# Patient Record
Sex: FEMALE | Race: WHITE | NOT HISPANIC OR LATINO | Employment: FULL TIME | URBAN - METROPOLITAN AREA
[De-identification: names, ages, dates, MRNs, and addresses within clinical notes are randomized per-mention and may not be internally consistent; named-entity substitution may affect disease eponyms.]

---

## 2017-01-16 ENCOUNTER — TELEPHONE (OUTPATIENT)
Dept: OBGYN | Facility: CLINIC | Age: 42
End: 2017-01-16

## 2017-01-16 NOTE — TELEPHONE ENCOUNTER
- Pt states she spoke to you a few weeks ago about seeing you to discuss removing one of her ovary's due to pelvic pain. Pt states you told her to schedule a half hour consult.   Your schedule is full- when could I work her into your schedule? ( CLAIRE Baker works in lab at [facility]). Thanks. DANY Murguia RN

## 2017-01-18 ENCOUNTER — OFFICE VISIT (OUTPATIENT)
Dept: SLEEP MEDICINE | Facility: CLINIC | Age: 42
End: 2017-01-18
Payer: COMMERCIAL

## 2017-01-18 VITALS
DIASTOLIC BLOOD PRESSURE: 60 MMHG | TEMPERATURE: 98.5 F | OXYGEN SATURATION: 98 % | HEIGHT: 62 IN | WEIGHT: 117 LBS | HEART RATE: 58 BPM | SYSTOLIC BLOOD PRESSURE: 111 MMHG | BODY MASS INDEX: 21.53 KG/M2

## 2017-01-18 DIAGNOSIS — G47.21 CIRCADIAN RHYTHM SLEEP DISORDER, DELAYED SLEEP PHASE TYPE: Primary | ICD-10-CM

## 2017-01-18 PROCEDURE — 99213 OFFICE O/P EST LOW 20 MIN: CPT | Performed by: PSYCHIATRY & NEUROLOGY

## 2017-01-18 NOTE — PROGRESS NOTES
DEPARTMENT OF NEUROLOGY    Patient Name:  Olivia Flores  MRN:  [id number]    :  1975  Date of Clinic Visit:  2017  Primary Care Provider:  Renny Amaya        FOLLOW UP APPOINTMENT: Chronic fatigue and sleepiness      HPI:   Please see Dr. Melo Arellano's original intake note from 2016 for more information regarding her initial presentation.     In brief, 41 year old woman who was referred from her PCP due to severe chronic fatigue and sleepiness. She reported a several years-long history of fatigue that had been worsening over the 12 months prior to her referral. Following initial workup, it was determined the patient likely has a circadian misalignment, iron deficiency, sleep disruption from her 3 year-old daughter (who has a Rett syndrome variant) and possible sleep disordered breathing.     Plan at the end of her visit was to begin using light box and melatonin to help the circadian misalignment, perform sleep study to rule out possibility of DULCE, check ferritin to see if patient requires iron supplementation/infusion, and a referral for her daughter to a pediatric sleep specialist.      INTERVAL HISTORY:   Patient states that in the interim, she has somewhat improved. She says that since starting the melatonin, she is sleeping overall better--by which she means the quality of her her sleep has improved and she has fewer awakenings during the night--though she does note that she is not falling asleep any earlier (typically around 9-9:30pm) or rising any later (around 5am). She has not yet purchased the light box recommended at her last visit but does intend to do so. She takes the melatonin roughly 30 minutes prior to bed. She also states that there has been no change in sleep relating to her daughter with Rett syndrome. She has not yet been able to see the pediatric specialist Dr. Prieto recommended at last visit but still has the referral and hopes to make the appointment  "next month. She has no other concerns at this time.     Vital signs:                         Estimated body mass index is 21.06 kg/(m^2) as calculated from the following:    Height as of 11/30/16: 1.575 m (5' 2\").    Weight as of 11/30/16: 52.254 kg (115 lb 3.2 oz).      Examination:     -General: Woman sitting comfortably on the chair. No acute distress    -Neurological: No focal neurological deficits appreciated      Investigations:  All available and relevant labs, imaging, and other procedures were reviewed with the patient at this visit.       Assessment/Plan:   41 year old woman who was initially referred from her PCP due to severe chronic (years-long) fatigue and sleepiness that had worsened over the preceding 12 months. Initial concerns had been for circadian rhythm misalignment, previously known anemia, possible current iron deficiency. She was also referred to a pediatric sleep specialist for her daughter with Rett syndrome as it is thought that this too was contributing to her sleep difficulties. She was started on melatonin and does report to be sleeping better. She has not yet bought the light box that was recommended but plans to do so. It was recommended that she does buy the light box and that she can take the melatonin even earlier before bed (up to 2 hours or so) to help further align her circadian misalignment. She will continue with the previous plan to follow up with Dr. Prieto for her daughter's sleep issues. No evidence of ongoing anemia or DULCE on recent investigations. Gross neurological exam was otherwise non-focal. Plan will be to continue as above with melatonin, light box, and referral in the hopes of improved sleep for her daughter as well. Patient registered her understanding and agreement with this plan. She may follow-up as necessary in the clinic.    Continue to work on increasing iron intake in diet.      Patient should not operate a motor vehicle if tired or sleepy.      Patient " has been seen with attending neurologist, Dr. Lesia Hawk MD  Neurology PGY-2  [facility]        Sleep Staff Addendum  I have seen and evaluated the patient today with the sleep fellow and agree with the documentation.      LESIA GARCIA MD

## 2017-01-18 NOTE — NURSING NOTE
"Chief Complaint   Patient presents with     Sleep Problem     follow up       Initial /60 mmHg  Pulse 58  Temp(Src) 98.5  F (36.9  C) (Oral)  Ht 1.575 m (5' 2\")  Wt 53.071 kg (117 lb)  BMI 21.39 kg/m2  SpO2 98% Estimated body mass index is 21.39 kg/(m^2) as calculated from the following:    Height as of this encounter: 1.575 m (5' 2\").    Weight as of this encounter: 53.071 kg (117 lb).  BP completed using cuff size: regular right arm  Екатерина Phan MA  [facility]      "

## 2017-01-25 ENCOUNTER — OFFICE VISIT (OUTPATIENT)
Dept: OBGYN | Facility: CLINIC | Age: 42
End: 2017-01-25
Payer: COMMERCIAL

## 2017-01-25 ENCOUNTER — THERAPY VISIT (OUTPATIENT)
Dept: CHIROPRACTIC MEDICINE | Facility: CLINIC | Age: 42
End: 2017-01-25
Payer: COMMERCIAL

## 2017-01-25 VITALS
WEIGHT: 117.1 LBS | DIASTOLIC BLOOD PRESSURE: 70 MMHG | SYSTOLIC BLOOD PRESSURE: 100 MMHG | BODY MASS INDEX: 21.41 KG/M2 | TEMPERATURE: 99.4 F

## 2017-01-25 DIAGNOSIS — M99.02 THORACIC SEGMENT DYSFUNCTION: ICD-10-CM

## 2017-01-25 DIAGNOSIS — G43.919 INTRACTABLE MIGRAINE, UNSPECIFIED MIGRAINE TYPE: ICD-10-CM

## 2017-01-25 DIAGNOSIS — R29.3 POOR POSTURE: ICD-10-CM

## 2017-01-25 DIAGNOSIS — M99.01 CERVICAL SEGMENT DYSFUNCTION: ICD-10-CM

## 2017-01-25 DIAGNOSIS — R10.2 PELVIC PAIN IN FEMALE: Primary | ICD-10-CM

## 2017-01-25 DIAGNOSIS — M99.00 HEAD REGION SOMATIC DYSFUNCTION: ICD-10-CM

## 2017-01-25 DIAGNOSIS — M54.2 CERVICALGIA: Primary | ICD-10-CM

## 2017-01-25 PROCEDURE — 98941 CHIROPRACT MANJ 3-4 REGIONS: CPT | Mod: AT | Performed by: CHIROPRACTOR

## 2017-01-25 PROCEDURE — 99214 OFFICE O/P EST MOD 30 MIN: CPT | Performed by: OBSTETRICS & GYNECOLOGY

## 2017-01-25 PROCEDURE — 97810 ACUP 1/> WO ESTIM 1ST 15 MIN: CPT | Mod: GA | Performed by: CHIROPRACTOR

## 2017-01-25 NOTE — PROGRESS NOTES
Visit #:  8    Subjective:  Olivia Flores is a 40 year old female who is seen in f/u up for:        Thoracic segment dysfunction  Intractable migraine, unspecified migraine type  Cervicalgia  Head region somatic dysfunction  Cervical segment dysfunction  Poor posture.     Since last visit on 10/19/2016,  Olivia Flores reports:    Area of chief complaint:  Cervical and Thoracic :  Symptoms are graded at 8-9/10. The quality is described as stiff, dull.  Motion has increased, but is still not normal. The pt reports 3 severe migraines last week graded an 8/10 on VAS. She cannot relate the pain to any specific event. The pt was HA free for 3 weeks prior and was able to run without issue. She feels fluctuations in the weather may be an issue. The pt denies weakness in the extremities or other unusual sx.     Since last visit the patient feels that they are worse since the previous visit.        Objective:  The following was observed:    P: pain elicited on palpation: C2, C3 right, T5, T6,   A: static palpation demonstrates intersegmental asymmetry C2, C3 right, T5, T6,    R: motion palpation notes restricted motion  T: hypertonicity at: Sub-occipital and T-spine paraspinal Bilaterally    Segmental spinal dysfunction/restrictions found at:  C2, C3 right, T5, T6      Assessment:    Diagnoses:      1. Thoracic segment dysfunction    2. Intractable migraine, unspecified migraine type    3. Cervicalgia    4. Head region somatic dysfunction    5. Cervical segment dysfunction    6. Poor posture        Patient's condition:  Exacerbation/regression    Treatment effectiveness:  Post manipulation there is better intersegmental movement and Patient claims to feel looser post manipulation      Procedures:  CMT:  42457 Chiropractic manipulative treatment 3-4  regions performed   Cervical: Activator, C2, C3 , C7 , Prone, Supine  Thoracic: Activator T5, T6, Prone  Occiput: R OCC prone activator     Modalities:  48513:  Acupuncture:  Points: Lulu Points in cervical spine jaycob points of the cervical and thoracic musculature 20 minutes prone    Therapeutic procedures:  None    Response to Treatment  Reduction of symptoms no increase in sx, pt stable    Prognosis: Guarded    Progress towards Goals: Patient is making progress towards the goal.The pt would like to run or bike without pain in the neck and upper back  Duration to achieve goal will be 4-8 weeks at a frequency of 1 per week       Recommendations:    Instructions:drink plenty of fluids    Follow-up:  Return to care in 1 week to stabilize

## 2017-01-25 NOTE — PROGRESS NOTES
SUBJECTIVE:                                                   Olivia Flores is a 41 year old female who presents to clinic today for the following health issue(s):  Patient presents with:  Pelvic Pain: patient having ovaries pain x 2 years on and off.      HPI:  Olivia has a 2-3 year history of intermittent right sided pelvic pain. She has on numerous occasions had documented ovarian cysts on this side, which typically resolved with observation. She has tried Depo-Provera, Mirena IUD, and cannot take oral contraceptives due to a history of significant migraine headaches. She has a special needs child who is almost 5, and her main form of exercise is training for triathlons. She now feels that she has put up with this for as long as she is willing to do so, and is interested in other options including surgical intervention.    Patient's last menstrual period was 01/10/2017..   Patient is sexually active, .  Using vasectomy for contraception.     Problem list and histories reviewed & adjusted, as indicated.  Additional history: as documented.    Patient Active Problem List   Diagnosis     Migraine headache     Family history of high cholesterol     Hip pain     Knee pain     CARDIOVASCULAR SCREENING; LDL GOAL LESS THAN 160     Asthma, mild intermittent     Rh negative status during pregnancy     Adjustment disorder with anxiety     Right ovarian cyst     Fatigue     Iron deficiency anemia     FH: breast cancer     Family history of melanoma     Seasonal allergic rhinitis     Shoulder subluxation, left     Anxiety state     Pain in joint, ankle and foot     Vitamin B12 deficiency without anemia     Vitamin D deficiency     Iron deficiency     Bradycardia     Diastasis recti     Chronic fatigue     Family history of coronary artery disease     Intractable migraine, unspecified migraine type     Cervicalgia     Head region somatic dysfunction     Cervical segment dysfunction     Poor posture     Thoracic  segment dysfunction     Past Surgical History   Procedure Laterality Date     No history of surgery        Social History   Substance Use Topics     Smoking status: Never Smoker      Smokeless tobacco: Never Used     Alcohol Use: 0.0 oz/week     1-2 Glasses of wine per week      Comment: occassionlly/      Problem (# of Occurrences) Relation (Name,Age of Onset)    Blood Disease (1) Paternal Grandmother: ? PERNICIOUS ANEMIA, GETS MONTHLY B12 SHOTS    Breast Cancer (2) Maternal Grandmother: post menopause, Paternal Grandmother: post menopause    C.A.D. (2) Paternal Grandfather, Maternal Grandmother    CANCER (1) Father:  age 33 from widely metastatic fibrosarcoma, original site finger    DIABETES (1) Maternal Grandfather    Family History Negative (3) Mother, Sister, Brother    Hypertension (1) Maternal Grandmother    Lipids (4) Sister, Mother, Maternal Aunt, Maternal Aunt              Current Outpatient Prescriptions on File Prior to Visit:  ferrous sulfate Dried 140 (45 FE) MG TBCR Take 1 tablet by mouth daily IRON SUPPLEMENT - PLEASE TAKE WITH 4 OZ OF OJ WITH PULP, SUBSTITUTION OF IRON SUPPLEMENT PERMITTED   citalopram (CELEXA) 40 MG tablet Take 1 tablet (40 mg) by mouth daily INDICATION: TO TREAT ANXIETY   cyanocobalamin (VITAMIN B12) 1000 MCG/ML injection Inject 1 mL (1,000 mcg) Subcutaneous every 14 days INDICATION: FOR VITAMIN B12 SUPPLEMENTATION   butalbital-acetaminophen-caffeine (FIORICET, ESGIC) -40 MG per tablet Take 1 tablet by mouth every 4 hours as needed   Ibuprofen (ADVIL PO) Take 800 mg by mouth   ondansetron (ZOFRAN) 8 MG tablet Take 1 tablet (8 mg) by mouth every 8 hours as needed for nausea   penicillin V potassium (VEETID) 500 MG tablet Take 1 tablet (500 mg) by mouth 3 times daily INDICATION: STREP THROAT   nitrofurantoin, macrocrystal-monohydrate, (MACROBID) 100 MG capsule Take 1 capsule (100 mg) by mouth 2 times daily   order for DME Equipment being ordered: left velcrow wrist    vitamin D3 (CHOLECALCIFEROL) 60537 UNITS capsule Take 1 capsule (50,000 Units) by mouth every 30 days NDICATION: TO TREAT VITAMIN D DEFICIENCY   LORazepam (ATIVAN) 0.5 MG tablet Take 1 tablet (0.5 mg) by mouth every 8 hours as needed for anxiety   ORDER FOR DME Equipment being ordered: [employer]ace   [DISCONTINUED] Ferrous Sulfate (SPATONE PUR-ABSORB IRON) 5 MG/20ML LIQD Take 1 packet by mouth 3 times daily (before meals). INDICATION: TO CORRECT IRON DEFICIENCY - PLEASE TAKE WITH 4 OZ OF ORANGE JUICE WITH PULP (MICHA)     No current facility-administered medications on file prior to visit.  Allergies   Allergen Reactions     Imitrex [Sumatriptan]      Neck pain, dizziness, nausea       ROS:  5 point ROS negative except as noted above in HPI, including Gen., Resp., CV, GI &  system review.    OBJECTIVE:     /70 mmHg  Temp(Src) 99.4  F (37.4  C) (Oral)  Wt 117 lb 1.6 oz (53.116 kg)  LMP 01/10/2017   BMI: Body mass index is 21.41 kg/(m^2).  General: Alert and oriented, no distress.  Psychiatric: Mood and affect within normal limits.  Skin: Warm and dry, no lesions, rashes or discolorations.  Abdomen: Soft, nontender, no hepatosplenomegaly, no rebound or guarding, no masses, no hernias.   Vulva:  No external lesions, normal female hair distribution, no inguinal adenopathy.    Urethra:  Midline, non-tender, well supported, no discharge  Vagina:  Well-estrogenized, no abnormal discharge, no lesions  Cervix: nontender to palpation  Uterus:  anteverted, smooth contour, without enlargement, mobile, and without tenderness  Ovaries:  No masses appreciated, non-tender, mobile  Rectal Exam: deferred  Musculoskeletal: extremities normal    In-Clinic Test Results:  No results found for this or any previous visit (from the past 24 hour(s)).    ASSESSMENT/PLAN:                                                      History right ovarian cysts.  Persistent right-sided pelvic pain. We discussed the risks, benefits, and  alternatives to laparoscopic evaluation and right salpingo-oophorectomy. She is definitely interested in proceeding, and would like to schedule something for March. We also discussed removing the left fallopian tube at the same time, as she does have a family history of gynecologic cancer in THE MATERNAL SIDE, BUT SHE IS NOT CERTAIN WHETHER THIS WAS OVARIAN CANCER OR UTERINE CANCER. SHE DOES NOT PLAN TO HAVE MORE CHILDREN.    I will send a note to the  and she will see her primary care provider for H&P prior to surgery.          Rashmi Bella MD  [facility]

## 2017-01-25 NOTE — NURSING NOTE
"Chief Complaint   Patient presents with     Pelvic Pain     patient having ovaries pain x 2 years on and off.       Initial /70 mmHg  Temp(Src) 99.4  F (37.4  C) (Oral)  Wt 117 lb 1.6 oz (53.116 kg)  LMP 01/10/2017 Estimated body mass index is 21.41 kg/(m^2) as calculated from the following:    Height as of 1/18/17: 5' 2\" (1.575 m).    Weight as of this encounter: 117 lb 1.6 oz (53.116 kg).  BP completed using cuff size: regular  Maira Castle MA      "

## 2017-01-26 ENCOUNTER — TELEPHONE (OUTPATIENT)
Dept: OBGYN | Facility: CLINIC | Age: 42
End: 2017-01-26

## 2017-01-26 NOTE — TELEPHONE ENCOUNTER
Left message for patient to return the call to inform of surgery details listed below.    Surgery:  LAPAROSCOPIC RIGHT SALPINGO-OOPHORECTOMY, LEFT SALPINGECTOMY  Date:  3/15/17  Time:  7:20 AM  Hospital:  [facility]    Patient advised of the following:  The hospital will contact you 24-48 hours prior to surgery to discuss any pre op instructions.  Contact your insurance company to see if a prior authorization or second opinion is needed.  Please schedule a pre op appointment with your primary physician within 7 days of surgery.  No aspirin or Ibuprofen 10 days prior to surgery.  Make arrangements to have someone drive you home from the hospital.    Surgery specific and hospital information mailed to patient.    Information was placed on the surgery calendar in [place].

## 2017-01-26 NOTE — TELEPHONE ENCOUNTER
Surgeon:RASHAWN BELLA   Assist:  Yes Lupe if available   Location: [facility]   Date/time preference:  Patient preference     Surgery:  Laparoscopic right salpingo-oophorectomy, left salpingectomy   Length of Surgery:  1 hr   Diagnosis:  Right sided pelvic pain, history ovarian cysts   Anesthesia type:  GENERAL     Special instructions / equipment:  Ligasure   Am admit or same day: SAME DAY   Bowel prep: No   Pre op: PCP   Office visit with surgeon prior to surgery: No     Thanks,   Rashawn Bella MD

## 2017-02-01 ENCOUNTER — THERAPY VISIT (OUTPATIENT)
Dept: CHIROPRACTIC MEDICINE | Facility: CLINIC | Age: 42
End: 2017-02-01
Payer: COMMERCIAL

## 2017-02-01 DIAGNOSIS — M54.2 CERVICALGIA: Primary | ICD-10-CM

## 2017-02-01 DIAGNOSIS — M99.00 HEAD REGION SOMATIC DYSFUNCTION: ICD-10-CM

## 2017-02-01 DIAGNOSIS — G43.919 INTRACTABLE MIGRAINE, UNSPECIFIED MIGRAINE TYPE: ICD-10-CM

## 2017-02-01 DIAGNOSIS — M99.01 CERVICAL SEGMENT DYSFUNCTION: ICD-10-CM

## 2017-02-01 DIAGNOSIS — M99.02 THORACIC SEGMENT DYSFUNCTION: ICD-10-CM

## 2017-02-01 DIAGNOSIS — R29.3 POOR POSTURE: ICD-10-CM

## 2017-02-01 PROCEDURE — 98941 CHIROPRACT MANJ 3-4 REGIONS: CPT | Mod: AT | Performed by: CHIROPRACTOR

## 2017-02-01 PROCEDURE — 97810 ACUP 1/> WO ESTIM 1ST 15 MIN: CPT | Mod: GA | Performed by: CHIROPRACTOR

## 2017-02-01 NOTE — PROGRESS NOTES
Visit #:  2/2017    Subjective:  Olivia Flores is a 40 year old female who is seen in f/u up for:        Thoracic segment dysfunction  Intractable migraine, unspecified migraine type  Cervicalgia  Head region somatic dysfunction  Cervical segment dysfunction  Poor posture.     Since last visit on 10/19/2016,  Olivia Flores reports:    Area of chief complaint:  Cervical and Thoracic :  Symptoms are graded at 5-6/10. The quality is described as stiff, dull.  Motion has increased, but is still not normal. The pt reports 50% improvement since initial -presentation. She reported a HA this week however she states it is related to her cycle. She denies uncontrolled HA since the previous treatment. The pt denies other unusual sx.     Since last visit the patient feels that they are 50% improved since the previous treatment       Objective:  The following was observed:    P: pain elicited on palpation: C2, C3 right, T5, T6,   A: static palpation demonstrates intersegmental asymmetry C2, C3 right, T5, T6,    R: motion palpation notes restricted motion  T: hypertonicity at: Sub-occipital and T-spine paraspinal Bilaterally    Segmental spinal dysfunction/restrictions found at:  C2, C3 right, T5, T6      Assessment:    Diagnoses:      1. Thoracic segment dysfunction    2. Intractable migraine, unspecified migraine type    3. Cervicalgia    4. Head region somatic dysfunction    5. Cervical segment dysfunction    6. Poor posture        Patient's condition:  The pt is responding well to therapy    Treatment effectiveness:  Post manipulation there is better intersegmental movement and Patient claims to feel looser post manipulation      Procedures:  CMT:  95141 Chiropractic manipulative treatment 3-4  regions performed   Cervical: Activator, C2, C3 , C7 , Prone, Supine  Thoracic: Activator T5, T6, Prone  Occiput: R OCC prone activator     Modalities:  00921: Acupuncture:  Points: Lulu Points in cervical spine jaycob  points of the cervical and thoracic musculature 20 minutes prone    Therapeutic procedures:  None    Response to Treatment  Reduction of symptoms no increase in sx, pt stable    Prognosis: Guarded    Progress towards Goals: Patient is making progress towards the goal.The pt would like to run or bike without pain in the neck and upper back  Duration to achieve goal will be 4-8 weeks at a frequency of 1 per week       Recommendations:    Instructions:drink plenty of fluids    Follow-up:  Return to care in 1 week  Reduction of care is sx decline

## 2017-02-08 ENCOUNTER — THERAPY VISIT (OUTPATIENT)
Dept: CHIROPRACTIC MEDICINE | Facility: CLINIC | Age: 42
End: 2017-02-08
Payer: COMMERCIAL

## 2017-02-08 DIAGNOSIS — M99.00 HEAD REGION SOMATIC DYSFUNCTION: ICD-10-CM

## 2017-02-08 DIAGNOSIS — M54.2 CERVICALGIA: Primary | ICD-10-CM

## 2017-02-08 DIAGNOSIS — M99.01 CERVICAL SEGMENT DYSFUNCTION: ICD-10-CM

## 2017-02-08 DIAGNOSIS — M99.02 THORACIC SEGMENT DYSFUNCTION: ICD-10-CM

## 2017-02-08 DIAGNOSIS — G43.919 INTRACTABLE MIGRAINE, UNSPECIFIED MIGRAINE TYPE: ICD-10-CM

## 2017-02-08 PROCEDURE — 97810 ACUP 1/> WO ESTIM 1ST 15 MIN: CPT | Mod: GA | Performed by: CHIROPRACTOR

## 2017-02-08 PROCEDURE — 98940 CHIROPRACT MANJ 1-2 REGIONS: CPT | Mod: AT | Performed by: CHIROPRACTOR

## 2017-02-08 NOTE — PROGRESS NOTES
Visit #:  3/2017    Subjective:  Olivia Flores is a 40 year old female who is seen in f/u up for:        Thoracic segment dysfunction  Intractable migraine, unspecified migraine type  Cervicalgia  Head region somatic dysfunction  Cervical segment dysfunction  Poor posture.     Since last visit on 10/19/2016,  Olivia Flores reports:    Area of chief complaint:  Cervical and Thoracic :  Symptoms are graded at 4/10. The quality is described as stiff, dull.  Motion has increased, but is still not normal. The pt reports overall 70% to 75% improvement since initial presentation. She had one severe migraine in one week however she relates it to performing physical exercises followed by not eating and drinking adequately. Overall her sx are decreasing. The pt denies weakness or other unusual sx.     Since last visit the patient feels that they are 75% improved since the previous treatment       Objective:  The following was observed:    P: pain elicited on palpation: C2, C3 right, T5, T6,   A: static palpation demonstrates intersegmental asymmetry C2, C3 right, T5, T6,    R: motion palpation notes restricted motion  T: hypertonicity at: Sub-occipital and T-spine paraspinal Bilaterally    Segmental spinal dysfunction/restrictions found at:  C2, C3 right, T5, T6      Assessment:    Diagnoses:      1. Thoracic segment dysfunction    2. Intractable migraine, unspecified migraine type    3. Cervicalgia    4. Head region somatic dysfunction    5. Cervical segment dysfunction    6. Poor posture        Patient's condition:  The pt is responding well to therapy    Treatment effectiveness:  Post manipulation there is better intersegmental movement and Patient claims to feel looser post manipulation      Procedures:  CMT:  79174 Chiropractic manipulative treatment 1-2  regions performed   Cervical: Activator, C2, C3 , C7 , Prone, Supine  Thoracic: Activator T5, T6, Prone    Modalities:  22013: Acupuncture:  Points: [place]  Points in cervical spine jaycob points of the cervical and thoracic musculature 20 minutes prone    Therapeutic procedures:  None    Response to Treatment  Reduction of symptoms no increase in sx, pt stable    Prognosis: Guarded    Progress towards Goals: Patient is making progress towards the goal.The pt would like to run or bike without pain in the neck and upper back  Duration to achieve goal will be 4-8 weeks at a frequency of 1 per week       Recommendations:    Instructions:drink plenty of fluids    Follow-up:  Return to care in 1 week  Pt will be having surgery Mar 15/17

## 2017-02-17 ENCOUNTER — MYC REFILL (OUTPATIENT)
Dept: INTERNAL MEDICINE | Facility: CLINIC | Age: 42
End: 2017-02-17

## 2017-02-17 ENCOUNTER — THERAPY VISIT (OUTPATIENT)
Dept: CHIROPRACTIC MEDICINE | Facility: CLINIC | Age: 42
End: 2017-02-17
Payer: COMMERCIAL

## 2017-02-17 DIAGNOSIS — M99.02 THORACIC SEGMENT DYSFUNCTION: ICD-10-CM

## 2017-02-17 DIAGNOSIS — M54.2 CERVICALGIA: ICD-10-CM

## 2017-02-17 DIAGNOSIS — M99.01 CERVICAL SEGMENT DYSFUNCTION: ICD-10-CM

## 2017-02-17 DIAGNOSIS — G43.919 INTRACTABLE MIGRAINE, UNSPECIFIED MIGRAINE TYPE: Primary | ICD-10-CM

## 2017-02-17 DIAGNOSIS — E53.8 VITAMIN B12 DEFICIENCY WITHOUT ANEMIA: ICD-10-CM

## 2017-02-17 DIAGNOSIS — M99.00 HEAD REGION SOMATIC DYSFUNCTION: ICD-10-CM

## 2017-02-17 PROCEDURE — 97810 ACUP 1/> WO ESTIM 1ST 15 MIN: CPT | Mod: GA | Performed by: CHIROPRACTOR

## 2017-02-17 PROCEDURE — 98940 CHIROPRACT MANJ 1-2 REGIONS: CPT | Mod: AT | Performed by: CHIROPRACTOR

## 2017-02-17 RX ORDER — CYANOCOBALAMIN 1000 UG/ML
1 INJECTION, SOLUTION INTRAMUSCULAR; SUBCUTANEOUS
Qty: 30 ML | Refills: 0 | Status: SHIPPED | OUTPATIENT
Start: 2017-02-17 | End: 2017-05-23

## 2017-02-17 NOTE — PROGRESS NOTES
Visit #:  /2017      Subjective:  Olivia Flores is a 40 year old female who is seen in f/u up for:        Thoracic segment dysfunction  Intractable migraine, unspecified migraine type  Cervicalgia  Head region somatic dysfunction  Cervical segment dysfunction.     Since last visit on 10/19/2016,  Olivia Flores reports:    Area of chief complaint:  Cervical and Thoracic :  Symptoms are graded at 2/10. The quality is described as stiff, dull.  Motion has increased, but is still not normal. The pt reports overall 80% to 85% improvement since initial presentation. She denies migraines over the past week. She states HA are more stable over the past week. She reports a mild HA that was related to a change in prescriptive lenses. The pt will be having ovarian surgery in March. She denies weakness in the extremities or other unusual sx.     Since last visit the patient feels that they are 75% improved since the previous treatment       Objective:  The following was observed:    P: pain elicited on palpation: C2, C3 right, T5, T6,   A: static palpation demonstrates intersegmental asymmetry C2, C3 right, T5, T6,    R: motion palpation notes restricted motion  T: hypertonicity at: Sub-occipital and T-spine paraspinal Bilaterally    Segmental spinal dysfunction/restrictions found at:  C2, C3 right, T5, T6      Assessment:    Diagnoses:      1. Thoracic segment dysfunction    2. Intractable migraine, unspecified migraine type    3. Cervicalgia    4. Head region somatic dysfunction    5. Cervical segment dysfunction        Patient's condition:  The pt approaching baseline    Treatment effectiveness:  Post manipulation there is better intersegmental movement and Patient claims to feel looser post manipulation      Procedures:  CMT:  42780 Chiropractic manipulative treatment 1-2  regions performed   Cervical: Activator, C2, C3 , C7 , Prone, Supine      Modalities:  37525: Acupuncture:  Points: Lulu Points in cervical  spine jaycob points of the cervical and thoracic musculature 20 minutes prone    Therapeutic procedures:  None    Response to Treatment  Reduction of symptoms no increase in sx, pt stable    Prognosis: Guarded    Progress towards Goals: Patient is making progress towards the goal.The pt would like to run or bike without pain in the neck and upper back  Duration to achieve goal will be 4-8 weeks at a frequency of 1 per week       Recommendations:    Instructions:drink plenty of fluids    Follow-up:  Return to care in 2-3 weeks  Pt will be having surgery Mar 15/17

## 2017-02-17 NOTE — TELEPHONE ENCOUNTER
Message from MyChart:  Original authorizing provider: MD Olivia He would like a refill of the following medications:  cyanocobalamin (VITAMIN B12) 1000 MCG/ML injection [Renny Amaya MD]    Preferred pharmacy: [address]    Comment:

## 2017-02-17 NOTE — MR AVS SNAPSHOT
After Visit Summary   2/17/2017    Olivia Flores    MRN: [id number]           Patient Information     Date Of Birth          1975        Visit Information        Provider Department      2/17/2017 8:30 AM Ned Dominguez DC [facility] - [place] [facility]        Today's Diagnoses     Intractable migraine, unspecified migraine type    -  1    Thoracic segment dysfunction        Cervicalgia        Head region somatic dysfunction        Cervical segment dysfunction           Follow-ups after your visit        Your next 10 appointments already scheduled     Mar 01, 2017  8:15 AM CST   DOUGLAS Chiropractor with Ned Dominguez DC   [facility] [place] [facility] (DOUGLAS [place])    [address] [place] [address]  [address]  [place] [address] [phone]            Mar 15, 2017   Procedure with Rashmi Bella MD   [facility] (--)    [address] [facility] [address] [phone]              Who to contact     If you have questions or need follow up information about today's clinic visit or your schedule please contact [place] [facility] directly at [phone].  Normal or non-critical lab and imaging results will be communicated to you by MyChart, letter or phone within 4 business days after the clinic has received the results. If you do not hear from us within 7 days, please contact the clinic through [employer] or phone. If you have a critical or abnormal lab result, we will notify you by phone as soon as possible.  Submit refill requests through [employer] or call your pharmacy and they will forward the refill request to us. Please allow 3 business days for your refill to be completed.          Additional Information About Your Visit        [employer] Information     [employer] gives you secure access to your electronic health record. If you see a primary care provider,  you can also send messages to your care team and make appointments. If you have questions, please call your primary care clinic.  If you do not have a primary care provider, please call [phone] and they will assist you.        Care EveryWhere ID     This is your Care EveryWhere ID. This could be used by other organizations to access your [place] medical records  [id number]        Your Vitals Were     Last Period                   01/10/2017            Blood Pressure from Last 3 Encounters:   01/25/17 100/70   01/18/17 111/60   11/30/16 120/78    Weight from Last 3 Encounters:   01/25/17 53.1 kg (117 lb 1.6 oz)   01/18/17 53.1 kg (117 lb)   11/30/16 52.3 kg (115 lb 3.2 oz)              We Performed the Following     ACUPUNCTURE, 1+ NEEDLES, W/O ELECTRICAL STIM; INIT 15 MIN PERSONAL CONTACT     CHIROPRAC MANIP,SPINAL,1-2 REGIONS        Primary Care Provider Office Phone # Fax #    Renny Amaya [phone]       [facility] [address] [facility] [address]        Thank you!     Thank you for choosing [facility]  [facility] your care. Our goal is always to provide you with excellent care. Hearing back from our patients is one way we can continue to improve our services. Please take a few minutes to complete the written survey that you may receive in the mail after your visit with us. Thank you!             Your Updated Medication List - Protect others around you: Learn how to safely use, store and throw away your medicines at www.disposemymeds.org.          This list is accurate as of: 2/17/17 10:09 AM.  Always use your most recent med list.                   Brand Name Dispense Instructions for use    ADVIL PO      Take 800 mg by mouth       butalbital-acetaminophen-caffeine -40 MG per tablet    FIORICET/ESGIC    30 tablet    Take 1 tablet by mouth every 4 hours as needed       citalopram 40 MG tablet    celeXA    90 tablet     Take 1 tablet (40 mg) by mouth daily INDICATION: TO TREAT ANXIETY       cyanocobalamin 1000 MCG/ML injection    VITAMIN B12    30 mL    Inject 1 mL (1,000 mcg) Subcutaneous every 14 days INDICATION: FOR VITAMIN B12 SUPPLEMENTATION       ferrous sulfate Dried 140 (45 FE) MG Tbcr     90 tablet    Take 1 tablet by mouth daily IRON SUPPLEMENT - PLEASE TAKE WITH 4 OZ OF OJ WITH PULP, SUBSTITUTION OF IRON SUPPLEMENT PERMITTED       LORazepam 0.5 MG tablet    ATIVAN    20 tablet    Take 1 tablet (0.5 mg) by mouth every 8 hours as needed for anxiety       nitrofurantoin (macrocrystal-monohydrate) 100 MG capsule    MACROBID    14 capsule    Take 1 capsule (100 mg) by mouth 2 times daily       ondansetron 8 MG tablet    ZOFRAN    15 tablet    Take 1 tablet (8 mg) by mouth every 8 hours as needed for nausea       order for DME     1 Device    Equipment being ordered: [employer]       order for DME     1 Device    Equipment being ordered: left velcrow wrist       penicillin V potassium 500 MG tablet    VEETID    30 tablet    Take 1 tablet (500 mg) by mouth 3 times daily INDICATION: STREP THROAT       vitamin D3 62332 UNITS capsule    CHOLECALCIFEROL    40 capsule    Take 1 capsule (50,000 Units) by mouth every 30 days NDICATION: TO TREAT VITAMIN D DEFICIENCY

## 2017-03-01 ENCOUNTER — THERAPY VISIT (OUTPATIENT)
Dept: CHIROPRACTIC MEDICINE | Facility: CLINIC | Age: 42
End: 2017-03-01
Payer: COMMERCIAL

## 2017-03-01 DIAGNOSIS — M99.00 HEAD REGION SOMATIC DYSFUNCTION: ICD-10-CM

## 2017-03-01 DIAGNOSIS — G43.919 INTRACTABLE MIGRAINE, UNSPECIFIED MIGRAINE TYPE: Primary | ICD-10-CM

## 2017-03-01 DIAGNOSIS — M99.02 THORACIC SEGMENT DYSFUNCTION: ICD-10-CM

## 2017-03-01 DIAGNOSIS — M99.01 CERVICAL SEGMENT DYSFUNCTION: ICD-10-CM

## 2017-03-01 DIAGNOSIS — M54.2 CERVICALGIA: ICD-10-CM

## 2017-03-01 PROCEDURE — 97810 ACUP 1/> WO ESTIM 1ST 15 MIN: CPT | Mod: GA | Performed by: CHIROPRACTOR

## 2017-03-01 PROCEDURE — 98940 CHIROPRACT MANJ 1-2 REGIONS: CPT | Mod: AT | Performed by: CHIROPRACTOR

## 2017-03-01 NOTE — MR AVS SNAPSHOT
After Visit Summary   3/1/2017    Olivia Flores    MRN: [id number]           Patient Information     Date Of Birth          1975        Visit Information        Provider Department      3/1/2017 8:15 AM Ned Dominguez DC [facility] [facility] - [place] [facility]        Today's Diagnoses     Intractable migraine, unspecified migraine type    -  1    Thoracic segment dysfunction        Cervicalgia        Head region somatic dysfunction        Cervical segment dysfunction           Follow-ups after your visit        Your next 10 appointments already scheduled     Mar 15, 2017   Procedure with Rashmi Bella MD   [facility] (--)    [address] [facility] [address]   [phone]            Mar 29, 2017  8:30 AM CDT   DOUGLAS Chiropractor with Ned Dominguez DC   [facility] [place] [facility]rop[facility] ([facility] [place])    [address] [place] [address]  [address]  [place] [address] [phone]            Apr 26, 2017  8:30 AM CDT   DOUGLAS Chiropractor with Ned Dominguez DC   [facility] [place] [facility]rop[facility] ([facility] [place])    [address] [place] [address]  [address]  [place] [address] [phone]              Who to contact     If you have questions or need follow up information about today's clinic visit or your schedule please contact [facility] directly at [phone].  Normal or non-critical lab and imaging results will be communicated to you by MyChart, letter or phone within 4 business days after the clinic has received the results. If you do not hear from us within 7 days, please contact the clinic through MyChart or phone. If you have a critical or abnormal lab result, we will notify you by phone as soon as possible.  Submit refill requests through [employer] or call your pharmacy and they will forward the refill request to us. Please  allow 3 business days for your refill to be completed.          Additional Information About Your Visit        MyChart Information     [employer] gives you secure access to your electronic health record. If you see a primary care provider, you can also send messages to your care team and make appointments. If you have questions, please call your primary care clinic.  If you do not have a primary care provider, please call [phone] and they will assist you.        Care EveryWhere ID     This is your Care EveryWhere ID. This could be used by other organizations to access your [place] medical records  [id number]         Blood Pressure from Last 3 Encounters:   01/25/17 100/70   01/18/17 111/60   11/30/16 120/78    Weight from Last 3 Encounters:   01/25/17 53.1 kg (117 lb 1.6 oz)   01/18/17 53.1 kg (117 lb)   11/30/16 52.3 kg (115 lb 3.2 oz)              We Performed the Following     ACUPUNCTURE, 1+ NEEDLES, W/O ELECTRICAL STIM; INIT 15 MIN PERSONAL CONTACT     CHIROPRAC MANIP,SPINAL,1-2 REGIONS        Primary Care Provider Office Phone # Fax #    Renny Amaya [phone]       [facility] [address] [facility] [address]        Thank you!     Thank you for choosing [facility]  [facility]  for your care. Our goal is always to provide you with excellent care. Hearing back from our patients is one way we can continue to improve our services. Please take a few minutes to complete the written survey that you may receive in the mail after your visit with us. Thank you!             Your Updated Medication List - Protect others around you: Learn how to safely use, store and throw away your medicines at www.disposemymeds.org.          This list is accurate as of: 3/1/17 10:19 AM.  Always use your most recent med list.                   Brand Name Dispense Instructions for use    ADVIL PO      Take 800 mg by mouth        butalbital-acetaminophen-caffeine -40 MG per tablet    FIORICET/ESGIC    30 tablet    Take 1 tablet by mouth every 4 hours as needed       citalopram 40 MG tablet    celeXA    90 tablet    Take 1 tablet (40 mg) by mouth daily INDICATION: TO TREAT ANXIETY       cyanocobalamin 1000 MCG/ML injection    VITAMIN B12    30 mL    Inject 1 mL (1,000 mcg) Subcutaneous every 14 days INDICATION: FOR VITAMIN B12 SUPPLEMENTATION       ferrous sulfate Dried 140 (45 FE) MG Tbcr     90 tablet    Take 1 tablet by mouth daily IRON SUPPLEMENT - PLEASE TAKE WITH 4 OZ OF OJ WITH PULP, SUBSTITUTION OF IRON SUPPLEMENT PERMITTED       LORazepam 0.5 MG tablet    ATIVAN    20 tablet    Take 1 tablet (0.5 mg) by mouth every 8 hours as needed for anxiety       nitrofurantoin (macrocrystal-monohydrate) 100 MG capsule    MACROBID    14 capsule    Take 1 capsule (100 mg) by mouth 2 times daily       ondansetron 8 MG tablet    ZOFRAN    15 tablet    Take 1 tablet (8 mg) by mouth every 8 hours as needed for nausea       order for DME     1 Device    Equipment being ordered: [facility]       order for DME     1 Device    Equipment being ordered: left velcrow wrist       penicillin V potassium 500 MG tablet    VEETID    30 tablet    Take 1 tablet (500 mg) by mouth 3 times daily INDICATION: STREP THROAT       vitamin D3 10956 UNITS capsule    CHOLECALCIFEROL    40 capsule    Take 1 capsule (50,000 Units) by mouth every 30 days NDICATION: TO TREAT VITAMIN D DEFICIENCY

## 2017-03-01 NOTE — PROGRESS NOTES
Visit #:  5/2017      Subjective:  Olivia Flores is a 40 year old female who is seen in f/u up for:        Thoracic segment dysfunction  Intractable migraine, unspecified migraine type  Cervicalgia  Head region somatic dysfunction  Cervical segment dysfunction.     Since last visit on 10/19/2016,  Olivia Flroes reports:    Area of chief complaint:  Cervical and Thoracic :  Symptoms are graded at 1-2/10. The quality is described as stiff, dull.  Motion has increased, but is still not normal. The pt reports overall  85%-90% improvement since initial presentation and is stable. She experienced a mild HA in 2 weeks that resolved quickly. She denies migraines over the past 2 weeks. She will be having surgery in March. She denies other unusual sx.     Since last visit the patient feels that they are 85% improved since the previous treatment       Objective:  The following was observed:    P: pain elicited on palpation: C2, C3 right, T5, T6,   A: static palpation demonstrates intersegmental asymmetry C2, C3 right, T5, T6,    R: motion palpation notes restricted motion  T: hypertonicity at: Sub-occipital and T-spine paraspinal Bilaterally    Segmental spinal dysfunction/restrictions found at:  C2, C3 right, T5, T6      Assessment:    Diagnoses:      1. Thoracic segment dysfunction    2. Intractable migraine, unspecified migraine type    3. Cervicalgia    4. Head region somatic dysfunction    5. Cervical segment dysfunction        Patient's condition:  The pt has reached baseline and will be released PRN is sx persist     Treatment effectiveness:  Post manipulation there is better intersegmental movement and Patient claims to feel looser post manipulation      Procedures:  CMT:  31812 Chiropractic manipulative treatment 1-2  regions performed   Cervical: Activator, C2, C3 , C7 , Prone, Supine      Modalities:  66034: Acupuncture:  Points: Lulu Points in cervical spine jaycob points of the cervical and thoracic  musculature 20 minutes prone    Therapeutic procedures:  None    Response to Treatment  Reduction of symptoms no increase in sx, pt stable    Prognosis: Guarded    Progress towards Goals: Patient is making progress towards the goal.The pt would like to run or bike without pain in the neck and upper back  Duration to achieve goal will be 4-8 weeks at a frequency of 1 per week       Recommendations:    Instructions:drink plenty of fluids    Follow-up:  Return to care in 4 weeks or if sx persist   Pt will be having surgery Mar 15/17

## 2017-03-09 ENCOUNTER — OFFICE VISIT (OUTPATIENT)
Dept: INTERNAL MEDICINE | Facility: CLINIC | Age: 42
End: 2017-03-09
Payer: COMMERCIAL

## 2017-03-09 VITALS
BODY MASS INDEX: 21.47 KG/M2 | TEMPERATURE: 98.4 F | WEIGHT: 116.7 LBS | HEART RATE: 56 BPM | DIASTOLIC BLOOD PRESSURE: 60 MMHG | HEIGHT: 62 IN | SYSTOLIC BLOOD PRESSURE: 102 MMHG | OXYGEN SATURATION: 97 %

## 2017-03-09 DIAGNOSIS — Z01.818 PREOPERATIVE EXAMINATION: Primary | ICD-10-CM

## 2017-03-09 DIAGNOSIS — N83.201 RIGHT OVARIAN CYST: ICD-10-CM

## 2017-03-09 DIAGNOSIS — R10.2 PELVIC PAIN IN FEMALE: ICD-10-CM

## 2017-03-09 LAB
ALBUMIN SERPL-MCNC: 4.3 G/DL (ref 3.4–5)
ALP SERPL-CCNC: 49 U/L (ref 40–150)
ALT SERPL W P-5'-P-CCNC: 26 U/L (ref 0–50)
ANION GAP SERPL CALCULATED.3IONS-SCNC: 7 MMOL/L (ref 3–14)
AST SERPL W P-5'-P-CCNC: 22 U/L (ref 0–45)
BILIRUB SERPL-MCNC: 0.4 MG/DL (ref 0.2–1.3)
BUN SERPL-MCNC: 19 MG/DL (ref 7–30)
CALCIUM SERPL-MCNC: 9.7 MG/DL (ref 8.5–10.1)
CHLORIDE SERPL-SCNC: 102 MMOL/L (ref 94–109)
CO2 SERPL-SCNC: 28 MMOL/L (ref 20–32)
CREAT SERPL-MCNC: 0.64 MG/DL (ref 0.52–1.04)
GFR SERPL CREATININE-BSD FRML MDRD: ABNORMAL ML/MIN/1.7M2
GLUCOSE SERPL-MCNC: 106 MG/DL (ref 70–99)
MRSA DNA SPEC QL NAA+PROBE: NORMAL
POTASSIUM SERPL-SCNC: 3.8 MMOL/L (ref 3.4–5.3)
PROT SERPL-MCNC: 8 G/DL (ref 6.8–8.8)
SODIUM SERPL-SCNC: 137 MMOL/L (ref 133–144)
SPECIMEN SOURCE: NORMAL

## 2017-03-09 PROCEDURE — 80053 COMPREHEN METABOLIC PANEL: CPT | Performed by: INTERNAL MEDICINE

## 2017-03-09 PROCEDURE — 99214 OFFICE O/P EST MOD 30 MIN: CPT | Performed by: INTERNAL MEDICINE

## 2017-03-09 PROCEDURE — 87641 MR-STAPH DNA AMP PROBE: CPT | Performed by: INTERNAL MEDICINE

## 2017-03-09 PROCEDURE — 36415 COLL VENOUS BLD VENIPUNCTURE: CPT | Performed by: INTERNAL MEDICINE

## 2017-03-09 NOTE — NURSING NOTE
"Chief Complaint   Patient presents with     Pre-Op Exam     [facility] - Salpigooopherectomy on 3/15/17       Initial /60 (BP Location: Left arm, Patient Position: Chair, Cuff Size: Adult Regular)  Pulse 56  Temp 98.4  F (36.9  C) (Oral)  Ht 5' 2\" (1.575 m)  Wt 116 lb 11.2 oz (52.9 kg)  LMP 02/28/2017 (Exact Date)  SpO2 97%  Breastfeeding? No  BMI 21.34 kg/m2 Estimated body mass index is 21.34 kg/(m^2) as calculated from the following:    Height as of this encounter: 5' 2\" (1.575 m).    Weight as of this encounter: 116 lb 11.2 oz (52.9 kg).  Medication Reconciliation: complete     Kaminibose MA      "

## 2017-03-09 NOTE — PROGRESS NOTES
SUBJECTIVE:                                                      HPI: Olivia Flores is a pleasant 41 year old female who presents for preoperative evaluation.    Surgeon: Shayne  Date: 3/15/17  Location: [facility]  Surgery: laparoscopic right salpingo-oophorectomy, left salpingectomy (intermediate risk)  Indication: pelvic pain due to right ovarian cysts    Past Medical History   Diagnosis Date     BAILEY (generalized anxiety disorder)      Migraine with aura      Pelvic pain in female      due right ovarian cysts     Personal or family history of excessive or prolonged bleeding? No  Personal or family history of blood clots? No  History of snoring/Sleep Apnea? Snores, but no DULCE (recent sleep study - negative)  History of blood transfusions? No    Clinical Predictors of Increased Risk: none    Past Surgical History   Procedure Laterality Date     No history of surgery       Artificial assistive devices, such as pacemakers, artificial cardiac valves, or artificial joints? No    Allergies   Allergen Reactions     Imitrex [Sumatriptan]      Neck pain, dizziness, nausea     Personal or family history of allergy to anesthesia? No  Allergy to local or topical analgesics? No  Allergy to latex? No  Allergy to adhesives/skin preparations (chlorhexadine)? No    Current Outpatient Prescriptions   Medication Sig     cyanocobalamin (VITAMIN B12) 1000 MCG/ML injection Inject 1 mL (1,000 mcg) Subcutaneous every 14 days      ferrous sulfate Dried 140 (45 FE) MG TBCR Take 1 tablet by mouth daily        citalopram (CELEXA) 40 MG tablet Take 1 tablet (40 mg) by mouth daily      ondansetron (ZOFRAN) 8 MG tablet Take 1 tablet (8 mg) by mouth every 8 hours as needed for nausea     Ibuprofen (ADVIL PO) Take 800 mg by mouth     vitamin D3 (CHOLECALCIFEROL) 58638 UNITS capsule Take 1 capsule (50,000 Units) by mouth every 30 days      Over the counter medications? Other than above, no  Vitamin Supplements? Other than above, no  Herbal  Supplements? No    Family History   Problem Relation Age of Onset     Hyperlipidemia Mother      Melanoma Father       from metastatic fibrosarcoma (?progression from Melanoma)     Breast Cancer Maternal Grandmother      post menopausal     Myocardial Infarction Maternal Grandmother      later in life     Breast Cancer Paternal Grandmother      post-menopausal     Blood Disease Paternal Grandmother      ? PERNICIOUS ANEMIA, GETS MONTHLY B12 SHOTS     Hypertension Paternal Grandmother      Type 2 Diabetes Maternal Grandfather      Myocardial Infarction Paternal Grandfather      later in life     Hyperlipidemia Sister      Hyperlipidemia Maternal Aunt      Uterine Cancer Maternal Aunt      Hyperlipidemia Maternal Aunt      Other - See Comments Other      two paternal cousins with endometriosis     Uterine Cancer Paternal Aunt      CEREBROVASCULAR DISEASE No family hx of      Colon Cancer No family hx of      Occupational History     [employer]     Social History Main Topics     Smoking status: Never Smoker     Smokeless tobacco: Never Used     Alcohol use Yes      Comment: social     Drug use: No     Sexual activity: Yes     Partners: Male     Social History Narrative    .    2 daughters (5 and 7 as of 2017) - 1 special needs.    Runs, swims, or bikes - 4-5 days/week.      Functional capacity: Can participate in strenuous sports such as swimming, singles tennis, football, basketball, and skiing (>10 METs)    ROS:  Constitutional: denies unintentional weight loss or gain; denies fevers, chills, or sweats     Cardiovascular: denies chest pain, palpitations, or edema  Respiratory: denies cough, wheezing, shortness of breath, or dyspnea on exertion  Gastrointestinal: constant right pelvic pain; denies nausea, vomiting, constipation, or diarrhea  Genitourinary: denies urinary frequency, urgency, dysuria, or hematuria  Integumentary: denies rash or pruritus  Musculoskeletal: denies back pain,  "muscle pain, joint pain, or joint swelling  Neurologic: denies focal weakness, numbness, or tingling  Hematologic/Immunologic: denies history of anemia or blood transfusions  Endocrine: denies heat or cold intolerance; denies polyuria, polydipsia  Psychiatric: denies depression or anxiety    OBJECTIVE:                                                      /60 (BP Location: Left arm, Patient Position: Chair, Cuff Size: Adult Regular)  Pulse 56  Temp 98.4  F (36.9  C) (Oral)  Ht 5' 2\" (1.575 m)  Wt 116 lb 11.2 oz (52.9 kg)  LMP 02/28/2017 (Exact Date)  SpO2 97%  Breastfeeding? No  BMI 21.34 kg/m2  Constitutional: well-appearing  Eyes: normal conjunctivae and lids; pupils equal, round, and reactive to light  Ears, Nose, Mouth, and Throat: normal ears and nose; tympanic membranes visualized and normal; normal lips, teeth, and gums; no oropharyngeal lesions or ulcers  Neck: supple and symmetric; no lymphadenopathy; no thyromegaly or masses  Respiratory: normal respiratory effort; clear to auscultation bilaterally  Cardiovascular: regular rate and rhythm; pedal pulses palpable; no edema  Gastrointestinal: soft, non-tender, non-distended, and bowel sounds present; no organomegaly or masses  Musculoskeletal: normal gait and station; no clubbing or cyanosis  Psych: normal judgment and insight; normal mood and affect; recent and remote memory intact; oriented to time, place, and person    LABS:    Lab Results   Component Value Date    WBC 5.9 11/30/2016     Lab Results   Component Value Date    RBC 4.32 11/30/2016     Lab Results   Component Value Date    HGB 13.3 11/30/2016     Lab Results   Component Value Date    HCT 39.4 11/30/2016     Lab Results   Component Value Date    MCV 91 11/30/2016     Lab Results   Component Value Date    MCH 30.8 11/30/2016     Lab Results   Component Value Date    MCHC 33.8 11/30/2016     Lab Results   Component Value Date    RDW 12.1 11/30/2016     Lab Results   Component Value Date    "  11/30/2016     ASSESSMENT/PLAN:                                                      Olivia Flores is a pleasant 41 year old female who presents for a preoperative evaluation. She is at low clinical risk for an intermediate risk procedure.    (Z01.818) Preoperative examination  (primary encounter diagnosis)  (R10.2) Pelvic pain in female  (N83.201) Right ovarian cyst  Plan:   - CMP and nasal staph PCR today.   - STOP ibuprofen (and other NSAIDs if applicable) 1 week prior to surgery.   - no change to other medications (takes Celexa and iron at night).     RECOMMEND PROCEEDING WITH SURGERY: YES.    Thank you for referring Olivia Flores to me for consultation and allowing me to participate in her care.    The instructions on the AVS were discussed and explained to the patient. Patient expressed understanding of instructions.    A total of 25 minutes were spent face-to-face with this patient during this encounter and over half of that time was spent on counseling and coordination of care re: above diagnoses and plans of care.     Krystina Frias MD   [address] [address]  T: [phone], F: [phone]

## 2017-03-09 NOTE — PATIENT INSTRUCTIONS
No change in medications.     Avoid aspirin and ibuprofen (or Motril, Advil, Aleve, naproxen) 1 week prior to surgery.    ---    Nasal swab today.    CMP downstairs.

## 2017-03-09 NOTE — MR AVS SNAPSHOT
After Visit Summary   3/9/2017    Olivia Flores    MRN: [id number]           Patient Information     Date Of Birth          1975        Visit Information        Provider Department      3/9/2017 11:15 AM Krystina Frias MD [facility]        Today's Diagnoses     Preoperative examination    -  1    Pelvic pain in female        Right ovarian cyst          Care Instructions    No change in medications.     Avoid aspirin and ibuprofen (or Motril, Advil, Aleve, naproxen) 1 week prior to surgery.    ---    Nasal swab today.    CMP downstairs.         Follow-ups after your visit        Your next 10 appointments already scheduled     Mar 15, 2017   Procedure with Rashmi Bella MD   [facility] (--)    [address] [facility] [address]   [phone]            Mar 29, 2017  8:30 AM CDT   DOUGLAS Chiropractor with Ned Dominguez DC   [facility] [place] [facility] ([facility] [place])    [address] [place] [address]  [address]  [place] [address] [phone]            Apr 26, 2017  8:30 AM CDT   DOUGLAS Chiropractor with Ned Dominguez DC   [facility] [place] [facility] ([facility] [place])    [address] [place] [address]  [address]  [place] [address]   [phone]              Who to contact     If you have questions or need follow up information about today's clinic visit or your schedule please contact [facility] directly at [phone].  Normal or non-critical lab and imaging results will be communicated to you by MyChart, letter or phone within 4 business days after the clinic has received the results. If you do not hear from us within 7 days, please contact the clinic through MyChart or phone. If you have a critical or abnormal lab result, we will notify you by phone as soon as possible.  Submit refill requests through [employer] or call your pharmacy and they  "will forward the refill request to us. Please allow 3 business days for your refill to be completed.          Additional Information About Your Visit        [employer] Information     [employer] gives you secure access to your electronic health record. If you see a primary care provider, you can also send messages to your care team and make appointments. If you have questions, please call your primary care clinic.  If you do not have a primary care provider, please call [phone] and they will assist you.        Care EveryWhere ID     This is your Care EveryWhere ID. This could be used by other organizations to access your [facility] medical records  [id number]        Your Vitals Were     Pulse Temperature Height Last Period Pulse Oximetry Breastfeeding?    56 98.4  F (36.9  C) (Oral) 5' 2\" (1.575 m) 02/28/2017 (Exact Date) 97% No    BMI (Body Mass Index)                   21.34 kg/m2            Blood Pressure from Last 3 Encounters:   03/09/17 102/60   01/25/17 100/70   01/18/17 111/60    Weight from Last 3 Encounters:   03/09/17 116 lb 11.2 oz (52.9 kg)   03/08/17 115 lb (52.2 kg)   01/25/17 117 lb 1.6 oz (53.1 kg)              We Performed the Following     Comprehensive metabolic panel     Methicillin Resistant Staph Aureus PCR        Primary Care Provider Office Phone # Fax #    Renny Amaya [phone]       [facility] [address] [facility] [address]        Thank you!     Thank you for choosing [facility]  for your care. Our goal is always to provide you with excellent care. Hearing back from our patients is one way we can continue to improve our services. Please take a few minutes to complete the written survey that you may receive in the mail after your visit with us. Thank you!             Your Updated Medication List - Protect others around you: Learn how to safely use, store and throw away your medicines at www.disposemymeds.org.          This " list is accurate as of: 3/9/17 11:26 AM.  Always use your most recent med list.                   Brand Name Dispense Instructions for use    ADVIL PO      Take 800 mg by mouth       citalopram 40 MG tablet    celeXA    90 tablet    Take 1 tablet (40 mg) by mouth daily INDICATION: TO TREAT ANXIETY       cyanocobalamin 1000 MCG/ML injection    VITAMIN B12    30 mL    Inject 1 mL (1,000 mcg) Subcutaneous every 14 days INDICATION: FOR VITAMIN B12 SUPPLEMENTATION       ferrous sulfate Dried 140 (45 FE) MG Tbcr     90 tablet    Take 1 tablet by mouth daily IRON SUPPLEMENT - PLEASE TAKE WITH 4 OZ OF OJ WITH PULP, SUBSTITUTION OF IRON SUPPLEMENT PERMITTED       ondansetron 8 MG tablet    ZOFRAN    15 tablet    Take 1 tablet (8 mg) by mouth every 8 hours as needed for nausea       vitamin D3 50683 UNITS capsule    CHOLECALCIFEROL    40 capsule    Take 1 capsule (50,000 Units) by mouth every 30 days NDICATION: TO TREAT VITAMIN D DEFICIENCY

## 2017-03-14 NOTE — H&P (VIEW-ONLY)
SUBJECTIVE:                                                      HPI: Olivia Flores is a pleasant 41 year old female who presents for preoperative evaluation.    Surgeon: Shayne  Date: 3/15/17  Location: [facility]  Surgery: laparoscopic right salpingo-oophorectomy, left salpingectomy (intermediate risk)  Indication: pelvic pain due to right ovarian cysts    Past Medical History   Diagnosis Date     BAILEY (generalized anxiety disorder)      Migraine with aura      Pelvic pain in female      due right ovarian cysts     Personal or family history of excessive or prolonged bleeding? No  Personal or family history of blood clots? No  History of snoring/Sleep Apnea? Snores, but no DULCE (recent sleep study - negative)  History of blood transfusions? No    Clinical Predictors of Increased Risk: none    Past Surgical History   Procedure Laterality Date     No history of surgery       Artificial assistive devices, such as pacemakers, artificial cardiac valves, or artificial joints? No    Allergies   Allergen Reactions     Imitrex [Sumatriptan]      Neck pain, dizziness, nausea     Personal or family history of allergy to anesthesia? No  Allergy to local or topical analgesics? No  Allergy to latex? No  Allergy to adhesives/skin preparations (chlorhexadine)? No    Current Outpatient Prescriptions   Medication Sig     cyanocobalamin (VITAMIN B12) 1000 MCG/ML injection Inject 1 mL (1,000 mcg) Subcutaneous every 14 days      ferrous sulfate Dried 140 (45 FE) MG TBCR Take 1 tablet by mouth daily        citalopram (CELEXA) 40 MG tablet Take 1 tablet (40 mg) by mouth daily      ondansetron (ZOFRAN) 8 MG tablet Take 1 tablet (8 mg) by mouth every 8 hours as needed for nausea     Ibuprofen (ADVIL PO) Take 800 mg by mouth     vitamin D3 (CHOLECALCIFEROL) 67121 UNITS capsule Take 1 capsule (50,000 Units) by mouth every 30 days      Over the counter medications? Other than above, no  Vitamin Supplements? Other than above, no  Herbal  Supplements? No    Family History   Problem Relation Age of Onset     Hyperlipidemia Mother      Melanoma Father       from metastatic fibrosarcoma (?progression from Melanoma)     Breast Cancer Maternal Grandmother      post menopausal     Myocardial Infarction Maternal Grandmother      later in life     Breast Cancer Paternal Grandmother      post-menopausal     Blood Disease Paternal Grandmother      ? PERNICIOUS ANEMIA, GETS MONTHLY B12 SHOTS     Hypertension Paternal Grandmother      Type 2 Diabetes Maternal Grandfather      Myocardial Infarction Paternal Grandfather      later in life     Hyperlipidemia Sister      Hyperlipidemia Maternal Aunt      Uterine Cancer Maternal Aunt      Hyperlipidemia Maternal Aunt      Other - See Comments Other      two paternal cousins with endometriosis     Uterine Cancer Paternal Aunt      CEREBROVASCULAR DISEASE No family hx of      Colon Cancer No family hx of      Occupational History     [employer]     Social History Main Topics     Smoking status: Never Smoker     Smokeless tobacco: Never Used     Alcohol use Yes      Comment: social     Drug use: No     Sexual activity: Yes     Partners: Male     Social History Narrative    .    2 daughters (5 and 7 as of 2017) - 1 special needs.    Runs, swims, or bikes - 4-5 days/week.      Functional capacity: Can participate in strenuous sports such as swimming, singles tennis, football, basketball, and skiing (>10 METs)    ROS:  Constitutional: denies unintentional weight loss or gain; denies fevers, chills, or sweats     Cardiovascular: denies chest pain, palpitations, or edema  Respiratory: denies cough, wheezing, shortness of breath, or dyspnea on exertion  Gastrointestinal: constant right pelvic pain; denies nausea, vomiting, constipation, or diarrhea  Genitourinary: denies urinary frequency, urgency, dysuria, or hematuria  Integumentary: denies rash or pruritus  Musculoskeletal: denies back pain,  "muscle pain, joint pain, or joint swelling  Neurologic: denies focal weakness, numbness, or tingling  Hematologic/Immunologic: denies history of anemia or blood transfusions  Endocrine: denies heat or cold intolerance; denies polyuria, polydipsia  Psychiatric: denies depression or anxiety    OBJECTIVE:                                                      /60 (BP Location: Left arm, Patient Position: Chair, Cuff Size: Adult Regular)  Pulse 56  Temp 98.4  F (36.9  C) (Oral)  Ht 5' 2\" (1.575 m)  Wt 116 lb 11.2 oz (52.9 kg)  LMP 02/28/2017 (Exact Date)  SpO2 97%  Breastfeeding? No  BMI 21.34 kg/m2  Constitutional: well-appearing  Eyes: normal conjunctivae and lids; pupils equal, round, and reactive to light  Ears, Nose, Mouth, and Throat: normal ears and nose; tympanic membranes visualized and normal; normal lips, teeth, and gums; no oropharyngeal lesions or ulcers  Neck: supple and symmetric; no lymphadenopathy; no thyromegaly or masses  Respiratory: normal respiratory effort; clear to auscultation bilaterally  Cardiovascular: regular rate and rhythm; pedal pulses palpable; no edema  Gastrointestinal: soft, non-tender, non-distended, and bowel sounds present; no organomegaly or masses  Musculoskeletal: normal gait and station; no clubbing or cyanosis  Psych: normal judgment and insight; normal mood and affect; recent and remote memory intact; oriented to time, place, and person    LABS:    Lab Results   Component Value Date    WBC 5.9 11/30/2016     Lab Results   Component Value Date    RBC 4.32 11/30/2016     Lab Results   Component Value Date    HGB 13.3 11/30/2016     Lab Results   Component Value Date    HCT 39.4 11/30/2016     Lab Results   Component Value Date    MCV 91 11/30/2016     Lab Results   Component Value Date    MCH 30.8 11/30/2016     Lab Results   Component Value Date    MCHC 33.8 11/30/2016     Lab Results   Component Value Date    RDW 12.1 11/30/2016     Lab Results   Component Value Date    "  11/30/2016     ASSESSMENT/PLAN:                                                      Olivia Flores is a pleasant 41 year old female who presents for a preoperative evaluation. She is at low clinical risk for an intermediate risk procedure.    (Z01.818) Preoperative examination  (primary encounter diagnosis)  (R10.2) Pelvic pain in female  (N83.201) Right ovarian cyst  Plan:   - CMP and nasal staph PCR today.   - STOP ibuprofen (and other NSAIDs if applicable) 1 week prior to surgery.   - no change to other medications (takes Celexa and iron at night).     RECOMMEND PROCEEDING WITH SURGERY: YES.    Thank you for referring Olivia Flores to me for consultation and allowing me to participate in her care.    The instructions on the AVS were discussed and explained to the patient. Patient expressed understanding of instructions.    A total of 25 minutes were spent face-to-face with this patient during this encounter and over half of that time was spent on counseling and coordination of care re: above diagnoses and plans of care.     Krystina Frias MD   [address] [address]  T: [phone], F: [phone]

## 2017-03-15 ENCOUNTER — ANESTHESIA (OUTPATIENT)
Dept: SURGERY | Facility: CLINIC | Age: 42
End: 2017-03-15
Payer: COMMERCIAL

## 2017-03-15 ENCOUNTER — ANESTHESIA EVENT (OUTPATIENT)
Dept: SURGERY | Facility: CLINIC | Age: 42
End: 2017-03-15
Payer: COMMERCIAL

## 2017-03-15 ENCOUNTER — HOSPITAL ENCOUNTER (OUTPATIENT)
Facility: CLINIC | Age: 42
Discharge: HOME | End: 2017-03-15
Attending: OBSTETRICS & GYNECOLOGY | Admitting: OBSTETRICS & GYNECOLOGY
Payer: COMMERCIAL

## 2017-03-15 VITALS
RESPIRATION RATE: 16 BRPM | DIASTOLIC BLOOD PRESSURE: 74 MMHG | SYSTOLIC BLOOD PRESSURE: 131 MMHG | HEIGHT: 62 IN | WEIGHT: 116 LBS | OXYGEN SATURATION: 98 % | BODY MASS INDEX: 21.35 KG/M2 | TEMPERATURE: 98.2 F

## 2017-03-15 DIAGNOSIS — G89.18 POST-OPERATIVE PAIN: Primary | ICD-10-CM

## 2017-03-15 LAB
ABO + RH BLD: NORMAL
ABO + RH BLD: NORMAL
BLD GP AB SCN SERPL QL: NORMAL
BLOOD BANK CMNT PATIENT-IMP: NORMAL
HCG SERPL QL: NEGATIVE
SPECIMEN EXP DATE BLD: NORMAL

## 2017-03-15 PROCEDURE — 37000009 ZZH ANESTHESIA TECHNICAL FEE, EACH ADDTL 15 MIN: Performed by: OBSTETRICS & GYNECOLOGY

## 2017-03-15 PROCEDURE — 25000125 ZZHC RX 250: Performed by: NURSE ANESTHETIST, CERTIFIED REGISTERED

## 2017-03-15 PROCEDURE — 36000058 ZZH SURGERY LEVEL 3 EA 15 ADDTL MIN: Performed by: OBSTETRICS & GYNECOLOGY

## 2017-03-15 PROCEDURE — 25000125 ZZHC RX 250: Performed by: ANESTHESIOLOGY

## 2017-03-15 PROCEDURE — 71000012 ZZH RECOVERY PHASE 1 LEVEL 1 FIRST HR: Performed by: OBSTETRICS & GYNECOLOGY

## 2017-03-15 PROCEDURE — 86900 BLOOD TYPING SEROLOGIC ABO: CPT | Performed by: ANESTHESIOLOGY

## 2017-03-15 PROCEDURE — 86901 BLOOD TYPING SEROLOGIC RH(D): CPT | Performed by: ANESTHESIOLOGY

## 2017-03-15 PROCEDURE — 36415 COLL VENOUS BLD VENIPUNCTURE: CPT | Performed by: ANESTHESIOLOGY

## 2017-03-15 PROCEDURE — S0020 INJECTION, BUPIVICAINE HYDRO: HCPCS | Performed by: OBSTETRICS & GYNECOLOGY

## 2017-03-15 PROCEDURE — 37000008 ZZH ANESTHESIA TECHNICAL FEE, 1ST 30 MIN: Performed by: OBSTETRICS & GYNECOLOGY

## 2017-03-15 PROCEDURE — 88305 TISSUE EXAM BY PATHOLOGIST: CPT | Performed by: OBSTETRICS & GYNECOLOGY

## 2017-03-15 PROCEDURE — 58661 LAPAROSCOPY REMOVE ADNEXA: CPT | Performed by: OBSTETRICS & GYNECOLOGY

## 2017-03-15 PROCEDURE — 36000056 ZZH SURGERY LEVEL 3 1ST 30 MIN: Performed by: OBSTETRICS & GYNECOLOGY

## 2017-03-15 PROCEDURE — 25000566 ZZH SEVOFLURANE, EA 15 MIN: Performed by: OBSTETRICS & GYNECOLOGY

## 2017-03-15 PROCEDURE — 27210794 ZZH OR GENERAL SUPPLY STERILE: Performed by: OBSTETRICS & GYNECOLOGY

## 2017-03-15 PROCEDURE — 40000306 ZZH STATISTIC PRE PROC ASSESS II: Performed by: OBSTETRICS & GYNECOLOGY

## 2017-03-15 PROCEDURE — 71000013 ZZH RECOVERY PHASE 1 LEVEL 1 EA ADDTL HR: Performed by: OBSTETRICS & GYNECOLOGY

## 2017-03-15 PROCEDURE — 25800025 ZZH RX 258: Performed by: ANESTHESIOLOGY

## 2017-03-15 PROCEDURE — 71000027 ZZH RECOVERY PHASE 2 EACH 15 MINS: Performed by: OBSTETRICS & GYNECOLOGY

## 2017-03-15 PROCEDURE — 25000132 ZZH RX MED GY IP 250 OP 250 PS 637: Performed by: OBSTETRICS & GYNECOLOGY

## 2017-03-15 PROCEDURE — 25000128 H RX IP 250 OP 636: Performed by: NURSE ANESTHETIST, CERTIFIED REGISTERED

## 2017-03-15 PROCEDURE — 84703 CHORIONIC GONADOTROPIN ASSAY: CPT | Performed by: ANESTHESIOLOGY

## 2017-03-15 PROCEDURE — 25000128 H RX IP 250 OP 636: Performed by: ANESTHESIOLOGY

## 2017-03-15 PROCEDURE — 25000125 ZZHC RX 250: Performed by: OBSTETRICS & GYNECOLOGY

## 2017-03-15 PROCEDURE — 86850 RBC ANTIBODY SCREEN: CPT | Performed by: ANESTHESIOLOGY

## 2017-03-15 PROCEDURE — 88305 TISSUE EXAM BY PATHOLOGIST: CPT | Mod: 26 | Performed by: OBSTETRICS & GYNECOLOGY

## 2017-03-15 RX ORDER — OXYCODONE HYDROCHLORIDE 5 MG/1
5-10 TABLET ORAL EVERY 4 HOURS PRN
Qty: 30 TABLET | Refills: 0 | Status: SHIPPED | OUTPATIENT
Start: 2017-03-15 | End: 2017-03-15

## 2017-03-15 RX ORDER — ACETAMINOPHEN 10 MG/ML
1000 INJECTION, SOLUTION INTRAVENOUS ONCE
Status: COMPLETED | OUTPATIENT
Start: 2017-03-15 | End: 2017-03-15

## 2017-03-15 RX ORDER — DIPHENHYDRAMINE HYDROCHLORIDE 50 MG/ML
25 INJECTION INTRAMUSCULAR; INTRAVENOUS ONCE
Status: COMPLETED | OUTPATIENT
Start: 2017-03-15 | End: 2017-03-15

## 2017-03-15 RX ORDER — OXYCODONE HYDROCHLORIDE 5 MG/1
5-10 TABLET ORAL EVERY 4 HOURS PRN
Qty: 30 TABLET | Refills: 0 | Status: SHIPPED | OUTPATIENT
Start: 2017-03-15 | End: 2017-05-23

## 2017-03-15 RX ORDER — ONDANSETRON 4 MG/1
4 TABLET, ORALLY DISINTEGRATING ORAL EVERY 30 MIN PRN
Status: DISCONTINUED | OUTPATIENT
Start: 2017-03-15 | End: 2017-03-15 | Stop reason: HOSPADM

## 2017-03-15 RX ORDER — OXYCODONE HYDROCHLORIDE 5 MG/1
5 TABLET ORAL ONCE
Status: COMPLETED | OUTPATIENT
Start: 2017-03-15 | End: 2017-03-15

## 2017-03-15 RX ORDER — ONDANSETRON 2 MG/ML
INJECTION INTRAMUSCULAR; INTRAVENOUS PRN
Status: DISCONTINUED | OUTPATIENT
Start: 2017-03-15 | End: 2017-03-15

## 2017-03-15 RX ORDER — SODIUM CHLORIDE, SODIUM LACTATE, POTASSIUM CHLORIDE, CALCIUM CHLORIDE 600; 310; 30; 20 MG/100ML; MG/100ML; MG/100ML; MG/100ML
INJECTION, SOLUTION INTRAVENOUS CONTINUOUS
Status: DISCONTINUED | OUTPATIENT
Start: 2017-03-15 | End: 2017-03-15 | Stop reason: HOSPADM

## 2017-03-15 RX ORDER — LIDOCAINE HYDROCHLORIDE 10 MG/ML
INJECTION, SOLUTION INFILTRATION; PERINEURAL PRN
Status: DISCONTINUED | OUTPATIENT
Start: 2017-03-15 | End: 2017-03-15

## 2017-03-15 RX ORDER — HYDRALAZINE HYDROCHLORIDE 20 MG/ML
2.5-5 INJECTION INTRAMUSCULAR; INTRAVENOUS EVERY 10 MIN PRN
Status: DISCONTINUED | OUTPATIENT
Start: 2017-03-15 | End: 2017-03-15 | Stop reason: HOSPADM

## 2017-03-15 RX ORDER — ONDANSETRON 2 MG/ML
4 INJECTION INTRAMUSCULAR; INTRAVENOUS EVERY 30 MIN PRN
Status: DISCONTINUED | OUTPATIENT
Start: 2017-03-15 | End: 2017-03-15 | Stop reason: HOSPADM

## 2017-03-15 RX ORDER — LABETALOL HYDROCHLORIDE 5 MG/ML
10 INJECTION, SOLUTION INTRAVENOUS
Status: DISCONTINUED | OUTPATIENT
Start: 2017-03-15 | End: 2017-03-15 | Stop reason: HOSPADM

## 2017-03-15 RX ORDER — GLYCOPYRROLATE 0.2 MG/ML
INJECTION, SOLUTION INTRAMUSCULAR; INTRAVENOUS PRN
Status: DISCONTINUED | OUTPATIENT
Start: 2017-03-15 | End: 2017-03-15

## 2017-03-15 RX ORDER — DIMENHYDRINATE 50 MG/ML
25 INJECTION, SOLUTION INTRAMUSCULAR; INTRAVENOUS
Status: DISCONTINUED | OUTPATIENT
Start: 2017-03-15 | End: 2017-03-15 | Stop reason: HOSPADM

## 2017-03-15 RX ORDER — KETOROLAC TROMETHAMINE 30 MG/ML
INJECTION, SOLUTION INTRAMUSCULAR; INTRAVENOUS PRN
Status: DISCONTINUED | OUTPATIENT
Start: 2017-03-15 | End: 2017-03-15

## 2017-03-15 RX ORDER — HYDROMORPHONE HYDROCHLORIDE 1 MG/ML
.3-.5 INJECTION, SOLUTION INTRAMUSCULAR; INTRAVENOUS; SUBCUTANEOUS EVERY 5 MIN PRN
Status: DISCONTINUED | OUTPATIENT
Start: 2017-03-15 | End: 2017-03-15 | Stop reason: HOSPADM

## 2017-03-15 RX ORDER — LIDOCAINE 40 MG/G
CREAM TOPICAL
Status: DISCONTINUED | OUTPATIENT
Start: 2017-03-15 | End: 2017-03-15 | Stop reason: HOSPADM

## 2017-03-15 RX ORDER — BUPIVACAINE HYDROCHLORIDE 2.5 MG/ML
INJECTION, SOLUTION EPIDURAL; INFILTRATION; INTRACAUDAL PRN
Status: DISCONTINUED | OUTPATIENT
Start: 2017-03-15 | End: 2017-03-15 | Stop reason: HOSPADM

## 2017-03-15 RX ORDER — PROPOFOL 10 MG/ML
INJECTION, EMULSION INTRAVENOUS PRN
Status: DISCONTINUED | OUTPATIENT
Start: 2017-03-15 | End: 2017-03-15

## 2017-03-15 RX ORDER — NEOSTIGMINE METHYLSULFATE 1 MG/ML
VIAL (ML) INJECTION PRN
Status: DISCONTINUED | OUTPATIENT
Start: 2017-03-15 | End: 2017-03-15

## 2017-03-15 RX ORDER — FENTANYL CITRATE 50 UG/ML
25-50 INJECTION, SOLUTION INTRAMUSCULAR; INTRAVENOUS
Status: DISCONTINUED | OUTPATIENT
Start: 2017-03-15 | End: 2017-03-15 | Stop reason: HOSPADM

## 2017-03-15 RX ORDER — FENTANYL CITRATE 50 UG/ML
INJECTION, SOLUTION INTRAMUSCULAR; INTRAVENOUS PRN
Status: DISCONTINUED | OUTPATIENT
Start: 2017-03-15 | End: 2017-03-15

## 2017-03-15 RX ORDER — OXYCODONE HYDROCHLORIDE 5 MG/1
5-10 TABLET ORAL EVERY 4 HOURS PRN
Status: DISCONTINUED | OUTPATIENT
Start: 2017-03-15 | End: 2017-03-15 | Stop reason: HOSPADM

## 2017-03-15 RX ORDER — DEXAMETHASONE SODIUM PHOSPHATE 4 MG/ML
INJECTION, SOLUTION INTRA-ARTICULAR; INTRALESIONAL; INTRAMUSCULAR; INTRAVENOUS; SOFT TISSUE PRN
Status: DISCONTINUED | OUTPATIENT
Start: 2017-03-15 | End: 2017-03-15

## 2017-03-15 RX ADMIN — DIPHENHYDRAMINE HYDROCHLORIDE 25 MG: 50 INJECTION, SOLUTION INTRAMUSCULAR; INTRAVENOUS at 09:13

## 2017-03-15 RX ADMIN — DEXAMETHASONE SODIUM PHOSPHATE 4 MG: 4 INJECTION, SOLUTION INTRAMUSCULAR; INTRAVENOUS at 07:27

## 2017-03-15 RX ADMIN — GLYCOPYRROLATE 0.4 MG: 0.2 INJECTION, SOLUTION INTRAMUSCULAR; INTRAVENOUS at 08:00

## 2017-03-15 RX ADMIN — FENTANYL CITRATE 50 MCG: 50 INJECTION INTRAMUSCULAR; INTRAVENOUS at 08:39

## 2017-03-15 RX ADMIN — Medication 3 MG: at 08:00

## 2017-03-15 RX ADMIN — ROCURONIUM BROMIDE 30 MG: 10 INJECTION INTRAVENOUS at 07:27

## 2017-03-15 RX ADMIN — GLYCOPYRROLATE 0.2 MG: 0.2 INJECTION, SOLUTION INTRAMUSCULAR; INTRAVENOUS at 07:27

## 2017-03-15 RX ADMIN — FENTANYL CITRATE 50 MCG: 50 INJECTION, SOLUTION INTRAMUSCULAR; INTRAVENOUS at 07:48

## 2017-03-15 RX ADMIN — DIPHENHYDRAMINE HYDROCHLORIDE 25 MG: 50 INJECTION, SOLUTION INTRAMUSCULAR; INTRAVENOUS at 09:39

## 2017-03-15 RX ADMIN — PROPOFOL 150 MG: 10 INJECTION, EMULSION INTRAVENOUS at 07:27

## 2017-03-15 RX ADMIN — LIDOCAINE HYDROCHLORIDE 50 MG: 10 INJECTION, SOLUTION INFILTRATION; PERINEURAL at 07:27

## 2017-03-15 RX ADMIN — KETOROLAC TROMETHAMINE 30 MG: 30 INJECTION, SOLUTION INTRAMUSCULAR at 07:58

## 2017-03-15 RX ADMIN — OXYCODONE HYDROCHLORIDE 5 MG: 5 TABLET ORAL at 09:48

## 2017-03-15 RX ADMIN — ONDANSETRON 4 MG: 2 INJECTION INTRAMUSCULAR; INTRAVENOUS at 07:27

## 2017-03-15 RX ADMIN — FENTANYL CITRATE 50 MCG: 50 INJECTION INTRAMUSCULAR; INTRAVENOUS at 08:30

## 2017-03-15 RX ADMIN — SODIUM CHLORIDE, POTASSIUM CHLORIDE, SODIUM LACTATE AND CALCIUM CHLORIDE: 600; 310; 30; 20 INJECTION, SOLUTION INTRAVENOUS at 08:35

## 2017-03-15 RX ADMIN — ACETAMINOPHEN 1000 MG: 10 INJECTION, SOLUTION INTRAVENOUS at 09:14

## 2017-03-15 RX ADMIN — MIDAZOLAM HYDROCHLORIDE 2 MG: 1 INJECTION, SOLUTION INTRAMUSCULAR; INTRAVENOUS at 07:22

## 2017-03-15 RX ADMIN — FENTANYL CITRATE 100 MCG: 50 INJECTION, SOLUTION INTRAMUSCULAR; INTRAVENOUS at 07:27

## 2017-03-15 RX ADMIN — SODIUM CHLORIDE, POTASSIUM CHLORIDE, SODIUM LACTATE AND CALCIUM CHLORIDE: 600; 310; 30; 20 INJECTION, SOLUTION INTRAVENOUS at 07:22

## 2017-03-15 RX ADMIN — HYDROMORPHONE HYDROCHLORIDE 0.5 MG: 10 INJECTION, SOLUTION INTRAMUSCULAR; INTRAVENOUS; SUBCUTANEOUS at 09:41

## 2017-03-15 RX ADMIN — OXYCODONE HYDROCHLORIDE 5 MG: 5 TABLET ORAL at 10:28

## 2017-03-15 ASSESSMENT — ENCOUNTER SYMPTOMS
SEIZURES: 0
DYSRHYTHMIAS: 0
STRIDOR: 0

## 2017-03-15 ASSESSMENT — LIFESTYLE VARIABLES: TOBACCO_USE: 0

## 2017-03-15 ASSESSMENT — COPD QUESTIONNAIRES: COPD: 0

## 2017-03-15 NOTE — IP AVS SNAPSHOT
[facility] PreOP/PostOP    [address]    [facility] [address]    Phone:  [phone]    Fax:  [phone]                                       After Visit Summary   3/15/2017    Olivia Flores    MRN: [id number]           After Visit Summary Signature Page     I have received my discharge instructions, and my questions have been answered. I have discussed any challenges I see with this plan with the nurse or doctor.    ..........................................................................................................................................  Patient/Patient Representative Signature      ..........................................................................................................................................  Patient Representative Print Name and Relationship to Patient    ..................................................               ................................................  Date                                            Time    ..........................................................................................................................................  Reviewed by Signature/Title    ...................................................              ..............................................  Date                                                            Time

## 2017-03-15 NOTE — ANESTHESIA PREPROCEDURE EVALUATION
Anesthesia Evaluation     . Pt has had prior anesthetic. Type: MAC    No history of anesthetic complications     ROS/MED HX    ENT/Pulmonary:     (+)DULCE risk factors (neg sleep study) snores loudly, , . .   (-) tobacco use, asthma, COPD and recent URI   Neurologic:     (+)migraines,    (-) seizures and CVA   Cardiovascular:  - neg cardiovascular ROS      (-) hypertension, CAD, arrhythmias and valvular problems/murmurs   METS/Exercise Tolerance:     Hematologic: Comments: Lab Test        11/30/16     06/12/15     12/18/13                       1303          0928          1815          WBC          5.9          5.4          8.8           HGB          13.3         12.7         15.3          MCV          91           88           87            PLT          391          267          250            Lab Test        03/09/17     03/21/16     06/12/15                       1152          0817          0928          NA           137          139          139           POTASSIUM    3.8          3.7          3.9           CHLORIDE     102          106          107           CO2          28           29           26            BUN          19           21           18            CR           0.64         0.72         0.77          ANIONGAP     7            4            6             GRACIELA          9.7          8.5          9.2           GLC          106*         78           79           - neg hematologic  ROS       Musculoskeletal:  - neg musculoskeletal ROS       GI/Hepatic:  - neg GI/hepatic ROS      (-) GERD, hepatitis and liver disease   Renal/Genitourinary:  - ROS Renal section negative       Endo:  - neg endo ROS    (-) Type I DM, Type II DM, thyroid disease, chronic steroid usage and obesity   Psychiatric:     (+) psychiatric history anxiety      Infectious Disease:  - neg infectious disease ROS       Malignancy:         Other:    - neg other ROS           Physical Exam  Normal systems: cardiovascular, pulmonary and  dental    Airway   Mallampati: I  TM distance: >3 FB  Neck ROM: full    Dental     Cardiovascular   Rhythm and rate: regular and normal  (-) no friction rub, no systolic click and no murmur    Pulmonary    breath sounds clear to auscultation(-) no rhonchi, no decreased breath sounds, no wheezes, no rales and no stridor                    Anesthesia Plan      History & Physical Review  History and physical reviewed and following examination; no interval change.    ASA Status:  2 .    NPO Status:  > 8 hours    Plan for General and ETT with Intravenous induction. Maintenance will be Balanced.    PONV prophylaxis:  Ondansetron (or other 5HT-3) and Dexamethasone or Solumedrol       Postoperative Care  Postoperative pain management:  IV analgesics.      Consents  Anesthetic plan, risks, benefits and alternatives discussed with:  Patient or representative, Patient and Spouse..                          .

## 2017-03-15 NOTE — IP AVS SNAPSHOT
MRN:[id number]                      After Visit Summary   3/15/2017    Olivia Flores    MRN: [id number]           Thank you!     Thank you for choosing [facility] for your care. Our goal is always to provide you with excellent care. Hearing back from our patients is one way we can continue to improve our services. Please take a few minutes to complete the written survey that you may receive in the mail after you visit. If you would like to speak to someone directly about your visit please contact Patient Relations at [phone]. Thank you!          Patient Information     Date Of Birth          1975        About your hospital stay     You were admitted on:  March 15, 2017 You last received care in the:  [facility] PreOP/PostOP    You were discharged on:  March 15, 2017       Who to Call     For medical emergencies, please call 911.  For non-urgent questions about your medical care, please call your primary care provider or clinic, [phone]  For questions related to your surgery, please call your surgery clinic        Attending Provider     Provider Specialty    Rashmi Bella MD OB/Gyn       Primary Care Provider Office Phone # Fax #    Renny Amaya [phone]       [facility] [address] [facility] [address]        Your next 10 appointments already scheduled     Mar 29, 2017  8:30 AM CDT   [facility] Chiropractor with Ned Dominguez DC   [facility] [place] [facility] ([place] [place])    [id number]  [place] [id number] [phone]            Apr 26, 2017  8:30 AM CDT   [facility] Chiropractor with [facility] [place] [facility] ([facility] [place])    [address]  [place] [id number] [phone]              Further instructions from your care team       GENERAL ANESTHESIA OR SEDATION ADULT DISCHARGE  INSTRUCTIONS   SPECIAL PRECAUTIONS FOR 24 HOURS AFTER SURGERY    IT IS NOT UNUSUAL TO FEEL LIGHT-HEADED OR FAINT, UP TO 24 HOURS AFTER SURGERY OR WHILE TAKING PAIN MEDICATION.  IF YOU HAVE THESE SYMPTOMS; SIT FOR A FEW MINUTES BEFORE STANDING AND HAVE SOMEONE ASSIST YOU WHEN YOU GET UP TO WALK OR USE THE BATHROOM.    YOU SHOULD REST AND RELAX FOR THE NEXT 24 HOURS AND YOU MUST MAKE ARRANGEMENTS TO HAVE SOMEONE STAY WITH YOU FOR AT LEAST 24 HOURS AFTER YOUR DISCHARGE.  AVOID HAZARDOUS AND STRENUOUS ACTIVITIES.  DO NOT MAKE IMPORTANT DECISIONS FOR 24 HOURS.    DO NOT DRIVE ANY VEHICLE OR OPERATE MECHANICAL EQUIPMENT FOR 24 HOURS FOLLOWING THE END OF YOUR SURGERY.  EVEN THOUGH YOU MAY FEEL NORMAL, YOUR REACTIONS MAY BE AFFECTED BY THE MEDICATION YOU HAVE RECEIVED.    DO NOT DRINK ALCOHOLIC BEVERAGES FOR 24 HOURS FOLLOWING YOUR SURGERY.    DRINK CLEAR LIQUIDS (APPLE JUICE, GINGER ALE, 7-UP, BROTH, ETC.).  PROGRESS TO YOUR REGULAR DIET AS YOU FEEL ABLE.    YOU MAY HAVE A DRY MOUTH, A SORE THROAT, MUSCLES ACHES OR TROUBLE SLEEPING.  THESE SHOULD GO AWAY AFTER 24 HOURS.    CALL YOUR DOCTOR FOR ANY OF THE FOLLOWING:  SIGNS OF INFECTION (FEVER, GROWING TENDERNESS AT THE SURGERY SITE, A LARGE AMOUNT OF DRAINAGE OR BLEEDING, SEVERE PAIN, FOUL-SMELLING DRAINAGE, REDNESS OR SWELLING.    IT HAS BEEN OVER 8 TO 10 HOURS SINCE SURGERY AND YOU ARE STILL NOT ABLE TO URINATE (PASS WATER).       LAPAROSCOPY DISCHARGE INSTRUCTIONS    PLEASE RETURN TO THE CLINIC IN:  ____1 WEEK  ____2 WEEKS  ____4 WEEKS  ____6 WEEKS  MAKE THIS APPOINTMENT AFTER YOU GET HOME IF IT HAS NOT ALREADY BEEN SCHEDULED.    DO NOT DRIVE A CAR, DRINK ALCOHOL OR USE MACHINERY FOR THE NEXT 24 HOURS.  YOU SHOULD WAIT UNTIL YOU HAVE RECOVERED BEFORE MAKING ANY IMPORTANT DECISIONS.    PAIN  YOU MAY HAVE CRAMPS, SHOULDER PAIN OR A LOW BACKACHE FOR 24 TO 48 HOURS.  TYLENOL (ACETAMINOPHEN) OR MOTRIN (IBUPROFEN) MAY HELP, OR YOUR DOCTOR MAY GIVE YOU PAIN MEDICINE.  CALL YOUR  DOCTOR IF PAIN CANNOT BE CONTROLLED.    BLEEDING OR VAGINAL DISCHARGE  YOU MAY HAVE SOME BLEEDING OR DISCHARGE FOR UP TO A WEEK OR LONGER.  DO NOT DOUCHE, USE TAMPONS OR HAVE SEX (INTERCOURSE) FOR ______DAYS.  CALL YOUR DOCTOR IF YOU SOAK MORE THAN ONE MAXI PAD (SANITARY NAPKIN) PER HOUR, OR IF YOU PASS LARGE BLOOD CLOTS.    FEVER  YOU MAY HAVE A LOW FEVER FOR THE FIRST TWO DAYS.  CALL YOUR DOCTOR IF IT GOES OVER 101 DEGREES.    NAUSEA  IF YOU HAVE NAUSEA (FEEL SICK TO YOUR STOMACH), STAY IN BED.  TRY DRINKING A SMALL AMOUNT OF 7-UP, TEA OR SOUP.    SWOLLEN BELLY  IF YOUR ABDOMEN (BELLY AREA) FEELS FIRM OR SWOLLEN, CALL YOUR DOCTOR.    DIZZINESS AND WEAKNESS  YOU MAY FEEL DIZZY OR WEAK FOR A FEW DAYS.  IF SO, YOU SHOULD REST OFTEN, STAND UP SLOWLY AND USE CARE WHEN CLIMBING STAIRS.    DIET AND ACTIVITY  EAT LIGHT MEALS AND DRINK PLENTY OF FLUIDS FOR THE FIRST 24 HOURS (OR LONGER, IF YOU HAVE NAUSEA).  WAIT 5 DAYS BEFORE BATHING.  SHOWERS ARE OKAY.  MOST WOMEN CAN RETURN TO WORK AFTER 24 HOURS.  YOU MAY GO BACK TO YOUR OTHER ACTIVITIES AFTER YOUR PAIN GOES AWAY.      IF YOU HAVE STITCHES  YOU MAY REMOVE YOUR BANDAGE THE DAY AFTER TREATMENT.  YOUR DOCTOR WILL TELL YOU IF YOUR STITCHES NEED TO BE REMOVED.  SOME STITCHES DISSOLVE OVER TIME.           Dr. Rashmi Bella's clinic phone number:  [phone]      You received Toradol, an IV form of ibuprofen (Motrin) at 8am.  Do not take any ibuprofen products until 2pm.    Maximum acetaminophen (Tylenol) dose from all sources should not exceed 4 grams (4000 mg) per day.  You had 1,000 mg of IV tylenol at 9:15 am.    You took one oxycodone pain pill at 9:45 am.            Pending Results     Date and Time Order Name Status Description    3/15/2017 0757 Surgical pathology exam In process             Admission Information     Date & Time Provider Department Dept. Phone    3/15/2017 Rashmi Bella MD [facility] PreOP/PostOP [phone]      Your Vitals Were     Blood  "Pressure Temperature Respirations Height Weight Last Period    131/74 98.2  F (36.8  C) (Temporal) 14 1.575 m (5' 2\") 52.6 kg (116 lb) 02/28/2017    Pulse Oximetry BMI (Body Mass Index)                98% 21.22 kg/m2          [employer] Information     [employer] gives you secure access to your electronic health record. If you see a primary care provider, you can also send messages to your care team and make appointments. If you have questions, please call your primary care clinic.  If you do not have a primary care provider, please call [phone] and they will assist you.        Care EveryWhere ID     This is your Care EveryWhere ID. This could be used by other organizations to access your [facility] medical records  [id number]           Review of your medicines      START taking        Dose / Directions    oxyCODONE 5 MG IR tablet   Commonly known as:  ROXICODONE   Used for:  Post-operative pain        Dose:  5-10 mg   Take 1-2 tablets (5-10 mg) by mouth every 4 hours as needed for moderate to severe pain   Quantity:  30 tablet   Refills:  0         CONTINUE these medicines which have NOT CHANGED        Dose / Directions    ADVIL PO        Dose:  800 mg   Take 800 mg by mouth   Refills:  0       citalopram 40 MG tablet   Commonly known as:  celeXA   Used for:  Anxiety        Dose:  40 mg   Take 1 tablet (40 mg) by mouth daily INDICATION: TO TREAT ANXIETY   Quantity:  90 tablet   Refills:  3       cyanocobalamin 1000 MCG/ML injection   Commonly known as:  VITAMIN B12   Used for:  Vitamin B12 deficiency without anemia        Dose:  1 mL   Inject 1 mL (1,000 mcg) Subcutaneous every 14 days INDICATION: FOR VITAMIN B12 SUPPLEMENTATION   Quantity:  30 mL   Refills:  0       ferrous sulfate Dried 140 (45 FE) MG Tbcr   Used for:  Iron deficiency        Dose:  1 tablet   Take 1 tablet by mouth daily IRON SUPPLEMENT - PLEASE TAKE WITH 4 OZ OF OJ WITH PULP, SUBSTITUTION OF IRON SUPPLEMENT PERMITTED   Quantity:  90 tablet "   Refills:  0       ondansetron 8 MG tablet   Commonly known as:  ZOFRAN   Used for:  Nausea        Dose:  8 mg   Take 1 tablet (8 mg) by mouth every 8 hours as needed for nausea   Quantity:  15 tablet   Refills:  3       vitamin D3 94848 UNITS capsule   Commonly known as:  CHOLECALCIFEROL   Used for:  Vitamin D deficiency        Dose:  05393 Units   Take 1 capsule (50,000 Units) by mouth every 30 days NDICATION: TO TREAT VITAMIN D DEFICIENCY   Quantity:  40 capsule   Refills:  0            Where to get your medicines      Some of these will need a paper prescription and others can be bought over the counter. Ask your nurse if you have questions.     Bring a paper prescription for each of these medications     oxyCODONE 5 MG IR tablet                Protect others around you: Learn how to safely use, store and throw away your medicines at www.disposemymeds.org.             Medication List: This is a list of all your medications and when to take them. Check marks below indicate your daily home schedule. Keep this list as a reference.      Medications           Morning Afternoon Evening Bedtime As Needed    ADVIL PO   Take 800 mg by mouth                                citalopram 40 MG tablet   Commonly known as:  celeXA   Take 1 tablet (40 mg) by mouth daily INDICATION: TO TREAT ANXIETY                                cyanocobalamin 1000 MCG/ML injection   Commonly known as:  VITAMIN B12   Inject 1 mL (1,000 mcg) Subcutaneous every 14 days INDICATION: FOR VITAMIN B12 SUPPLEMENTATION                                ferrous sulfate Dried 140 (45 FE) MG Tbcr   Take 1 tablet by mouth daily IRON SUPPLEMENT - PLEASE TAKE WITH 4 OZ OF OJ WITH PULP, SUBSTITUTION OF IRON SUPPLEMENT PERMITTED                                ondansetron 8 MG tablet   Commonly known as:  ZOFRAN   Take 1 tablet (8 mg) by mouth every 8 hours as needed for nausea                                oxyCODONE 5 MG IR tablet   Commonly known as:  ROXICODONE    Take 1-2 tablets (5-10 mg) by mouth every 4 hours as needed for moderate to severe pain   Last time this was given:  5 mg on 3/15/2017  9:48 AM                                vitamin D3 82566 UNITS capsule   Commonly known as:  CHOLECALCIFEROL   Take 1 capsule (50,000 Units) by mouth every 30 days NDICATION: TO TREAT VITAMIN D DEFICIENCY

## 2017-03-15 NOTE — OP NOTE
OPERATIVE LAPAROSCOPY: BILATERAL SALPINGO-OOPHORECTOMY    DATE OF PROCEDURE:      March 15, 2017  STAFF SURGEON:  Rashmi Bella MD  PREOPERATIVE DIAGNOSIS: right sided pelvic pain, desires sterilization  POSTOPERATIVE DIAGNOSIS:  Same  PROCEDURE:  Laparoscopy, RIGHT SALPINGO-OOPHORECTOMY, left salpingectomy  ASSISTANT SURGEON:   ANESTHESIA:  General endotracheal anesthesia.  COMPLICATIONS: none noted  URINE OUTPUT: clear  IV FLUIDS: LR  EBL: 5 mL  SPECIMENS: right tube and ovary, left tube  FINDINGS: dilated vasculature in the right infundibulopelvic ligament and right mesosalpinx, normal uterus and upper abdomen, normal left tube and ovary    INDICATIONS: This is a 40 y/o female with a history of right sided pelvic pain for several years.  She was counseled on the risks, benefits, and alternatives to the procedure, and informed consent was signed.    DESCRIPTION OF PROCEDURE:  The patient was taken to the operating room where general endotracheal anesthesia was induced without difficulty and found to be adequate.  She was prepped and draped in the normal sterile fashion in the modified dorsal lithotomy position in [facility].  Largo speculum was placed in the vagina to visualize the cervix, and a [employer] uterine manipulator was placed. Castro catheter was placed for urinary drainage.     Gloves were changed and attention was turned to the abdomen.  A 5 mm umbilical skin incision was made with the scalpel. The fascia was grasped and elevated with small kocher clamps, and the Veress needle with step sheath was passed easily. Saline drop test suggested intraperitoneal position, and opening pressure was 4 mmHg. The abdomen was insufflated to 15 mmHg.  Patient was placed in Trendelenburg position.  Additional ports were then placed; one 5 mm port on the right side and one 11 mm on the left; both were placed under direct visualization.  No underlying damage to abdominal or pelvic organs was noted after placement  of the trocars.  The abdomen and pelvis were surveyed, normal anatomy noted with the exception of the above. Pelvic washings were not obtained. A blunt probe was used to evacuate the bowels from the pelvis. The ureters were identified in their normal anatomic position bilaterally. The Ligasure device was then used to clamp, coagulate, and divide the mesosalpinx on the left side, thereby removing the left tube entirely. This was removed through the 11 mm port. The same device was then used to coagulate and dividi the infundibulopelvic ligament on the right side, with the ureter in sight and well away from the operative field. Additional peritoneal attachments were taken down with the same device, and then the utero-ovarian ligament was divided in the same fashion. Careful examination of the operative field revealed excellent hemostasis. An endocatch bag was introduced through the 11 mm port, and both tubes and ovaries were placed inside. Specimens were removed intact in the bag. Careful reexamination of the pelvis revealed no bleeding, good ureteral peristalsis, and no abnormal pathology. Therefore, the lower trocars were removed under direct visualization, no bleeding was noted from those sites, and then gas was evacuated from the abdomen. All skin sites were closed with dermabond.     Attention was turned again to the pelvis. The Castro catheter was removed. A speculum was placed in the vagina, and the uterine manipulator was removed. Hemostasis at the tenaculum site was obtained with cautery.     All counts were correct x2.  The patient was cleaned off, returned to the supine position, awoken from anesthesia, and taken to postoperative recovery in good condition.    Rashmi Bella MD  8:08 AM  March 15, 2017

## 2017-03-15 NOTE — ANESTHESIA CARE TRANSFER NOTE
Patient: Olivia Flores    Procedure(s):  Laparoscopic Right Salpingo-Oophorectomy, and Left Salpingectomy  - Wound Class: II-Clean Contaminated    Diagnosis: Pelvic Pain  Diagnosis Additional Information: No value filed.    Anesthesia Type:   General, ETT     Note:  Airway :ETT  Patient transferred to:PACU  Comments: Pt spont resps to PACU ETT removed VSS Report to RN      Vitals: (Last set prior to Anesthesia Care Transfer)    CRNA VITALS  3/15/2017 0744 - 3/15/2017 0818      3/15/2017             Pulse: (!)  46    SpO2: 100 %    Resp Rate (observed): (!)  5                Electronically Signed By: SAIDA Rice CRNA  March 15, 2017  8:18 AM

## 2017-03-15 NOTE — ANESTHESIA POSTPROCEDURE EVALUATION
Patient: Olivia Flores    Procedure(s):  Laparoscopic Right Salpingo-Oophorectomy, and Left Salpingectomy  - Wound Class: II-Clean Contaminated    Diagnosis:Pelvic Pain  Diagnosis Additional Information: right sided pelvic pain, desires sterilization    Anesthesia Type:  General, ETT    Note:  Anesthesia Post Evaluation    Patient location during evaluation: PACU  Patient participation: Able to fully participate in evaluation  Level of consciousness: awake  Pain management: adequate  Airway patency: patent  Cardiovascular status: acceptable  Respiratory status: acceptable  Hydration status: acceptable  PONV: controlled     Anesthetic complications: None          Last vitals:  Vitals:    03/15/17 0930 03/15/17 0941 03/15/17 0955   BP: 103/67  110/67   Resp: 10 14    Temp:      SpO2: 96% 99%          Electronically Signed By: Syd Waite MD  March 15, 2017  10:03 AM

## 2017-03-15 NOTE — DISCHARGE INSTRUCTIONS
GENERAL ANESTHESIA OR SEDATION ADULT DISCHARGE INSTRUCTIONS   SPECIAL PRECAUTIONS FOR 24 HOURS AFTER SURGERY    IT IS NOT UNUSUAL TO FEEL LIGHT-HEADED OR FAINT, UP TO 24 HOURS AFTER SURGERY OR WHILE TAKING PAIN MEDICATION.  IF YOU HAVE THESE SYMPTOMS; SIT FOR A FEW MINUTES BEFORE STANDING AND HAVE SOMEONE ASSIST YOU WHEN YOU GET UP TO WALK OR USE THE BATHROOM.    YOU SHOULD REST AND RELAX FOR THE NEXT 24 HOURS AND YOU MUST MAKE ARRANGEMENTS TO HAVE SOMEONE STAY WITH YOU FOR AT LEAST 24 HOURS AFTER YOUR DISCHARGE.  AVOID HAZARDOUS AND STRENUOUS ACTIVITIES.  DO NOT MAKE IMPORTANT DECISIONS FOR 24 HOURS.    DO NOT DRIVE ANY VEHICLE OR OPERATE MECHANICAL EQUIPMENT FOR 24 HOURS FOLLOWING THE END OF YOUR SURGERY.  EVEN THOUGH YOU MAY FEEL NORMAL, YOUR REACTIONS MAY BE AFFECTED BY THE MEDICATION YOU HAVE RECEIVED.    DO NOT DRINK ALCOHOLIC BEVERAGES FOR 24 HOURS FOLLOWING YOUR SURGERY.    DRINK CLEAR LIQUIDS (APPLE JUICE, GINGER ALE, 7-UP, BROTH, ETC.).  PROGRESS TO YOUR REGULAR DIET AS YOU FEEL ABLE.    YOU MAY HAVE A DRY MOUTH, A SORE THROAT, MUSCLES ACHES OR TROUBLE SLEEPING.  THESE SHOULD GO AWAY AFTER 24 HOURS.    CALL YOUR DOCTOR FOR ANY OF THE FOLLOWING:  SIGNS OF INFECTION (FEVER, GROWING TENDERNESS AT THE SURGERY SITE, A LARGE AMOUNT OF DRAINAGE OR BLEEDING, SEVERE PAIN, FOUL-SMELLING DRAINAGE, REDNESS OR SWELLING.    IT HAS BEEN OVER 8 TO 10 HOURS SINCE SURGERY AND YOU ARE STILL NOT ABLE TO URINATE (PASS WATER).       LAPAROSCOPY DISCHARGE INSTRUCTIONS    PLEASE RETURN TO THE CLINIC IN:  ____1 WEEK  ____2 WEEKS  ____4 WEEKS  ____6 WEEKS  MAKE THIS APPOINTMENT AFTER YOU GET HOME IF IT HAS NOT ALREADY BEEN SCHEDULED.    DO NOT DRIVE A CAR, DRINK ALCOHOL OR USE MACHINERY FOR THE NEXT 24 HOURS.  YOU SHOULD WAIT UNTIL YOU HAVE RECOVERED BEFORE MAKING ANY IMPORTANT DECISIONS.    PAIN  YOU MAY HAVE CRAMPS, SHOULDER PAIN OR A LOW BACKACHE FOR 24 TO 48 HOURS.  TYLENOL (ACETAMINOPHEN) OR MOTRIN (IBUPROFEN) MAY HELP, OR  YOUR DOCTOR MAY GIVE YOU PAIN MEDICINE.  CALL YOUR DOCTOR IF PAIN CANNOT BE CONTROLLED.    BLEEDING OR VAGINAL DISCHARGE  YOU MAY HAVE SOME BLEEDING OR DISCHARGE FOR UP TO A WEEK OR LONGER.  DO NOT DOUCHE, USE TAMPONS OR HAVE SEX (INTERCOURSE) FOR ______DAYS.  CALL YOUR DOCTOR IF YOU SOAK MORE THAN ONE MAXI PAD (SANITARY NAPKIN) PER HOUR, OR IF YOU PASS LARGE BLOOD CLOTS.    FEVER  YOU MAY HAVE A LOW FEVER FOR THE FIRST TWO DAYS.  CALL YOUR DOCTOR IF IT GOES OVER 101 DEGREES.    NAUSEA  IF YOU HAVE NAUSEA (FEEL SICK TO YOUR STOMACH), STAY IN BED.  TRY DRINKING A SMALL AMOUNT OF 7-UP, TEA OR SOUP.    SWOLLEN BELLY  IF YOUR ABDOMEN (BELLY AREA) FEELS FIRM OR SWOLLEN, CALL YOUR DOCTOR.    DIZZINESS AND WEAKNESS  YOU MAY FEEL DIZZY OR WEAK FOR A FEW DAYS.  IF SO, YOU SHOULD REST OFTEN, STAND UP SLOWLY AND USE CARE WHEN CLIMBING STAIRS.    DIET AND ACTIVITY  EAT LIGHT MEALS AND DRINK PLENTY OF FLUIDS FOR THE FIRST 24 HOURS (OR LONGER, IF YOU HAVE NAUSEA).  WAIT 5 DAYS BEFORE BATHING.  SHOWERS ARE OKAY.  MOST WOMEN CAN RETURN TO WORK AFTER 24 HOURS.  YOU MAY GO BACK TO YOUR OTHER ACTIVITIES AFTER YOUR PAIN GOES AWAY.      IF YOU HAVE STITCHES  YOU MAY REMOVE YOUR BANDAGE THE DAY AFTER TREATMENT.  YOUR DOCTOR WILL TELL YOU IF YOUR STITCHES NEED TO BE REMOVED.  SOME STITCHES DISSOLVE OVER TIME.           Dr. Rashmi Bella's clinic phone number:  [phone]      You received Toradol, an IV form of ibuprofen (Motrin) at 8am.  Do not take any ibuprofen products until 2pm.    Maximum acetaminophen (Tylenol) dose from all sources should not exceed 4 grams (4000 mg) per day.  You had 1,000 mg of IV tylenol at 9:15 am.    You took one oxycodone pain pill at 9:45 am.

## 2017-03-15 NOTE — ANESTHESIA POSTPROCEDURE EVALUATION
Patient: Olivia Flores    Procedure(s):  Laparoscopic Right Salpingo-Oophorectomy, and Left Salpingectomy  - Wound Class: II-Clean Contaminated    Diagnosis:Pelvic Pain  Diagnosis Additional Information: right sided pelvic pain, desires sterilization    Anesthesia Type:  General, ETT    Note:  Anesthesia Post Evaluation    Last vitals:  Vitals:    03/15/17 0930 03/15/17 0941 03/15/17 0955   BP: 103/67  110/67   Resp: 10 14    Temp:      SpO2: 96% 99%          Electronically Signed By: Syd Waite MD  March 15, 2017  10:03 AM

## 2017-03-16 LAB — COPATH REPORT: NORMAL

## 2017-03-21 ENCOUNTER — OFFICE VISIT (OUTPATIENT)
Dept: OBGYN | Facility: CLINIC | Age: 42
End: 2017-03-21
Payer: COMMERCIAL

## 2017-03-21 ENCOUNTER — TELEPHONE (OUTPATIENT)
Dept: OBGYN | Facility: CLINIC | Age: 42
End: 2017-03-21

## 2017-03-21 VITALS
OXYGEN SATURATION: 99 % | BODY MASS INDEX: 22.03 KG/M2 | HEIGHT: 62 IN | DIASTOLIC BLOOD PRESSURE: 60 MMHG | SYSTOLIC BLOOD PRESSURE: 110 MMHG | TEMPERATURE: 98 F | HEART RATE: 63 BPM | WEIGHT: 119.7 LBS

## 2017-03-21 DIAGNOSIS — Z98.890 POSTOPERATIVE STATE: Primary | ICD-10-CM

## 2017-03-21 PROCEDURE — 99024 POSTOP FOLLOW-UP VISIT: CPT | Performed by: OBSTETRICS & GYNECOLOGY

## 2017-03-21 NOTE — PROGRESS NOTES
SUBJECTIVE:                                                   Olivia Flores is a 41 year old female who presents to clinic today for the following health issue(s):  Postoperative visit    HPI:  She is status post laparoscopic RSO and L salpingectomy 1 weeks ago. Reports no bleeding, no fevers, chills. Pathology was reviewed and is benign. She notes that overall she has felt well, but the LLQ incision is a little more tender and slightly more red than the umbilical and RLQ incisions. No bleeding or pain otherwise.        Problem list and histories reviewed & adjusted, as indicated.  Additional history: as documented.    Patient Active Problem List   Diagnosis     Pelvic pain in female     Migraine with aura     BAILEY (generalized anxiety disorder)     Past Surgical History   Procedure Laterality Date     No history of surgery       Laparoscopic salpingo-oophorectomy Bilateral 3/15/2017     Procedure: LAPAROSCOPIC SALPINGO-OOPHORECTOMY;  Surgeon: Rashmi Bella MD;  Location:  OR      Social History   Substance Use Topics     Smoking status: Never Smoker     Smokeless tobacco: Never Used     Alcohol use Yes      Comment: social      Problem (# of Occurrences) Relation (Name,Age of Onset)    Blood Disease (1) Paternal Grandmother: ? PERNICIOUS ANEMIA, GETS MONTHLY B12 SHOTS    Breast Cancer (2) Maternal Grandmother: post menopausal, Paternal Grandmother: post-menopausal    Hyperlipidemia (4) Mother, Sister, Maternal Aunt, Maternal Aunt    Hypertension (1) Paternal Grandmother    Melanoma (1) Father:  from metastatic fibrosarcoma (?progression from Melanoma)    Myocardial Infarction (2) Maternal Grandmother: later in life, Paternal Grandfather: later in life    Other - See Comments (1) Other: two paternal cousins with endometriosis    Type 2 Diabetes (1) Maternal Grandfather    Uterine Cancer (2) Maternal Aunt, Paternal Aunt       Negative family history of: CEREBROVASCULAR DISEASE, Colon Cancer         "      Current Outpatient Prescriptions on File Prior to Visit:  oxyCODONE (ROXICODONE) 5 MG IR tablet Take 1-2 tablets (5-10 mg) by mouth every 4 hours as needed for moderate to severe pain   ferrous sulfate Dried 140 (45 FE) MG TBCR Take 1 tablet by mouth daily IRON SUPPLEMENT - PLEASE TAKE WITH 4 OZ OF OJ WITH PULP, SUBSTITUTION OF IRON SUPPLEMENT PERMITTED   citalopram (CELEXA) 40 MG tablet Take 1 tablet (40 mg) by mouth daily INDICATION: TO TREAT ANXIETY   Ibuprofen (ADVIL PO) Take 800 mg by mouth   vitamin D3 (CHOLECALCIFEROL) 08142 UNITS capsule Take 1 capsule (50,000 Units) by mouth every 30 days NDICATION: TO TREAT VITAMIN D DEFICIENCY   cyanocobalamin (VITAMIN B12) 1000 MCG/ML injection Inject 1 mL (1,000 mcg) Subcutaneous every 14 days INDICATION: FOR VITAMIN B12 SUPPLEMENTATION (Patient not taking: Reported on 3/21/2017)   ondansetron (ZOFRAN) 8 MG tablet Take 1 tablet (8 mg) by mouth every 8 hours as needed for nausea (Patient not taking: Reported on 3/21/2017)   [DISCONTINUED] Ferrous Sulfate (SPATONE PUR-ABSORB IRON) 5 MG/20ML LIQD Take 1 packet by mouth 3 times daily (before meals). INDICATION: TO CORRECT IRON DEFICIENCY - PLEASE TAKE WITH 4 OZ OF ORANGE JUICE WITH PULP (MICHA)     No current facility-administered medications on file prior to visit.   Allergies   Allergen Reactions     Imitrex [Sumatriptan]      Neck pain, dizziness, nausea         OBJECTIVE:     /60 (BP Location: Right arm, Patient Position: Chair, Cuff Size: Adult Regular)  Pulse 63  Temp 98  F (36.7  C) (Oral)  Ht 5' 2\" (1.575 m)  Wt 119 lb 11.2 oz (54.3 kg)  LMP 02/28/2017  SpO2 99%  BMI 21.89 kg/m2   BMI: Body mass index is 21.89 kg/(m^2).  General: Alert and oriented, no distress.  Psychiatric: Mood and affect within normal limits.  Abdomen: soft, nontender. Umbilical and RLQ incisions are clean, dry, and intact, slight bruising, no erythema. LLQ incision also clean, dry, and intact, just a slight overlap of the skin in the " midline that is more red--no signs of infection or separation, no hernia. I did place more Dermabond over this incision.    In-Clinic Test Results:  No results found for this or any previous visit (from the past 24 hour(s)).    ASSESSMENT/PLAN:                                                        ICD-10-CM    1. Postoperative state Z98.890          Reviewed remaining restrictions. She will let me know if there are any changes in the LLQ site. I am not concerned for wound disruption or infection now.    Rashmi Bella MD  [facility]

## 2017-03-21 NOTE — MR AVS SNAPSHOT
After Visit Summary   3/21/2017    Olivia Flores    MRN: [id number]           Patient Information     Date Of Birth          1975        Visit Information        Provider Department      3/21/2017 3:00 PM Rashmi Bella MD [facility]        Today's Diagnoses     Postoperative state    -  1       Follow-ups after your visit        Your next 10 appointments already scheduled     Mar 29, 2017  8:30 AM CDT   [place] Chiropractor with Ned Dominguez DC   [facility] [place] [facility] ([place])    [address] [place] [address]  [address]  [place] [address]   [phone]            Apr 26, 2017  8:30 AM CDT   [facility] Chiropractor with Ned Dominguez DC   [facility] [place] [facility] ([place])    [address] [place] [address]  [address]  [place] [address]   [phone]              Who to contact     If you have questions or need follow up information about today's clinic visit or your schedule please contact [facility] directly at [phone].  Normal or non-critical lab and imaging results will be communicated to you by [email or web address].comhart, letter or phone within 4 business days after the clinic has received the results. If you do not hear from us within 7 days, please contact the clinic through [email or web address]com[employer] or phone. If you have a critical or abnormal lab result, we will notify you by phone as soon as possible.  Submit refill requests through [employer] or call your pharmacy and they will forward the refill request to us. Please allow 3 business days for your refill to be completed.          Additional Information About Your Visit        MyChart Information     [employer] gives you secure access to your electronic health record. If you see a primary care provider, you can also send messages to your care team and make appointments. If you have questions, please call your primary care clinic.  If you do not have a  "primary care provider, please call [phone] and they will assist you.        Care EveryWhere ID     This is your Care EveryWhere ID. This could be used by other organizations to access your [place] medical records  [id number]        Your Vitals Were     Pulse Temperature Height Last Period Pulse Oximetry BMI (Body Mass Index)    63 98  F (36.7  C) (Oral) 5' 2\" (1.575 m) 02/28/2017 99% 21.89 kg/m2       Blood Pressure from Last 3 Encounters:   03/21/17 110/60   03/15/17 131/74   03/09/17 102/60    Weight from Last 3 Encounters:   03/21/17 119 lb 11.2 oz (54.3 kg)   03/15/17 116 lb (52.6 kg)   03/09/17 116 lb 11.2 oz (52.9 kg)              Today, you had the following     No orders found for display       Primary Care Provider Office Phone # Fax #    Renny Amaya [phone]       [facility] [address] [place] [address]        Thank you!     Thank you for choosing [facility]  for your care. Our goal is always to provide you with excellent care. Hearing back from our patients is one way we can continue to improve our services. Please take a few minutes to complete the written survey that you may receive in the mail after your visit with us. Thank you!             Your Updated Medication List - Protect others around you: Learn how to safely use, store and throw away your medicines at www.disposemymeds.org.          This list is accurate as of: 3/21/17  3:18 PM.  Always use your most recent med list.                   Brand Name Dispense Instructions for use    ADVIL PO      Take 800 mg by mouth       citalopram 40 MG tablet    celeXA    90 tablet    Take 1 tablet (40 mg) by mouth daily INDICATION: TO TREAT ANXIETY       cyanocobalamin 1000 MCG/ML injection    VITAMIN B12    30 mL    Inject 1 mL (1,000 mcg) Subcutaneous every 14 days INDICATION: FOR VITAMIN B12 SUPPLEMENTATION       ferrous sulfate Dried 140 (45 FE) MG Tbcr     90 tablet    Take 1 tablet " by mouth daily IRON SUPPLEMENT - PLEASE TAKE WITH 4 OZ OF OJ WITH PULP, SUBSTITUTION OF IRON SUPPLEMENT PERMITTED       ondansetron 8 MG tablet    ZOFRAN    15 tablet    Take 1 tablet (8 mg) by mouth every 8 hours as needed for nausea       oxyCODONE 5 MG IR tablet    ROXICODONE    30 tablet    Take 1-2 tablets (5-10 mg) by mouth every 4 hours as needed for moderate to severe pain       vitamin D3 38411 UNITS capsule    CHOLECALCIFEROL    40 capsule    Take 1 capsule (50,000 Units) by mouth every 30 days NDICATION: TO TREAT VITAMIN D DEFICIENCY

## 2017-03-21 NOTE — TELEPHONE ENCOUNTER
"Pt calls, left  reporting she has incisional redness. Had surgery last Wed.    Per epic pt had lap salpingo-oophorectomy.    Called pt, reports incision x1 is red and \"more open\" compared to her other incision. Small amount of blood and drainage for site. Denies warmth, increased pain, and fever.    Scheduled appt with Dr. Bella today.  "

## 2017-03-21 NOTE — NURSING NOTE
"Chief Complaint   Patient presents with     Surgical Followup     surgery 3/15/17--left incision look red and a little discharge--pt had itchy rash on area where the surgery cleanser was       Initial /60 (BP Location: Right arm, Patient Position: Chair, Cuff Size: Adult Regular)  Pulse 63  Temp 98  F (36.7  C) (Oral)  Ht 5' 2\" (1.575 m)  Wt 119 lb 11.2 oz (54.3 kg)  LMP 02/28/2017  SpO2 99%  BMI 21.89 kg/m2 Estimated body mass index is 21.89 kg/(m^2) as calculated from the following:    Height as of this encounter: 5' 2\" (1.575 m).    Weight as of this encounter: 119 lb 11.2 oz (54.3 kg).  Medication Reconciliation: complete   Mary Ahuja CMA      "

## 2017-03-29 ENCOUNTER — THERAPY VISIT (OUTPATIENT)
Dept: CHIROPRACTIC MEDICINE | Facility: CLINIC | Age: 42
End: 2017-03-29
Payer: COMMERCIAL

## 2017-03-29 DIAGNOSIS — M99.02 THORACIC SEGMENT DYSFUNCTION: ICD-10-CM

## 2017-03-29 DIAGNOSIS — R51.9 CEPHALGIA: ICD-10-CM

## 2017-03-29 DIAGNOSIS — M54.2 CERVICALGIA: Primary | ICD-10-CM

## 2017-03-29 DIAGNOSIS — M99.01 CERVICAL SEGMENT DYSFUNCTION: ICD-10-CM

## 2017-03-29 DIAGNOSIS — M99.00 SEGMENTAL AND SOMATIC DYSFUNCTION OF HEAD REGION: ICD-10-CM

## 2017-03-29 PROCEDURE — 98940 CHIROPRACT MANJ 1-2 REGIONS: CPT | Mod: AT | Performed by: CHIROPRACTOR

## 2017-03-29 PROCEDURE — 97810 ACUP 1/> WO ESTIM 1ST 15 MIN: CPT | Mod: GA | Performed by: CHIROPRACTOR

## 2017-03-29 NOTE — PROGRESS NOTES
Visit #:  6/2017      Subjective:  Olivia Flores is a 40 year old female who is seen in f/u up for:     Data Unavailable.     Since last visit on 10/19/2016,  Olivia Flores reports:    Area of chief complaint:  Cervical and Thoracic :  Symptoms are graded at 1-2/10. The quality is described as stiff, dull.  Motion has increased and the pt stable The pt reports having no egregious migraines since the previous visit. She had surgery on March 15 and is stable. Yesterday she noted a mild HA at night that resolved the following day. Today she reports tension on the right side. The pt denies weakness or other unusual sx.       Since last visit the patient feels that they are 90% improved since the previous treatment       Objective:  The following was observed:    P: pain elicited on palpation: C2, C3 right, T5, T6,   A: static palpation demonstrates intersegmental asymmetry C2, C3 right, T5, T6,    R: motion palpation notes restricted motion  T: hypertonicity at: Sub-occipital and T-spine paraspinal Bilaterally    Segmental spinal dysfunction/restrictions found at:  C2, C3 right, T5, T6      Assessment:    Diagnoses:      No diagnosis found.    Patient's condition:  The pt has reached baseline and will be released PRN is sx persist     Treatment effectiveness:  Post manipulation there is better intersegmental movement and Patient claims to feel looser post manipulation      Procedures:  CMT:  30408 Chiropractic manipulative treatment 1-2  regions performed   Cervical: Activator, C2, C3 , C7 , Prone, Supine      Modalities:  95186: Acupuncture:  Points: Lulu Points in cervical spine jaycob points of the cervical and thoracic musculature 20 minutes prone    Therapeutic procedures:  None    Response to Treatment  Reduction of symptoms no increase in sx, pt stable    Prognosis: Guarded    Progress towards Goals: Patient is making progress towards the goal.The pt would like to run or bike without pain in the neck  and upper back-the pt has reached this goal  Duration to achieve goal will be 4-8 weeks at a frequency of 1 per week       Recommendations:    Instructions:drink plenty of fluids    Follow-up:  Return to care id sx persist   The pt is released from this episode

## 2017-03-29 NOTE — MR AVS SNAPSHOT
After Visit Summary   3/29/2017    Olivia Flores    MRN: [id number]           Patient Information     Date Of Birth          1975        Visit Information        Provider Department      3/29/2017 8:30 AM Ned Dominguez DC [facility] [place] [facility]        Today's Diagnoses     Cervicalgia    -  1    Cervical segment dysfunction        Thoracic segment dysfunction        Cephalgia        Segmental and somatic dysfunction of head region           Follow-ups after your visit        Your next 10 appointments already scheduled     Apr 26, 2017  8:30 AM CDT   [facility] Chiropractor with Ned Dominguez DC   [facility] [place] Chiropractor ([place])    [address] [place] [address]  [address]  [place] [address] [phone]              Who to contact     If you have questions or need follow up information about today's clinic visit or your schedule please contact [facility] [place] CHIR[facility]RACTOR directly at [phone].  Normal or non-critical lab and imaging results will be communicated to you by [employer] Software[employer], letter or phone within 4 business days after the clinic has received the results. If you do not hear from us within 7 days, please contact the clinic through [employer] or phone. If you have a critical or abnormal lab result, we will notify you by phone as soon as possible.  Submit refill requests through [employer] or call your pharmacy and they will forward the refill request to us. Please allow 3 business days for your refill to be completed.          Additional Information About Your Visit        [employer] Information     [employer] gives you secure access to your electronic health record. If you see a primary care provider, you can also send messages to your care team and make appointments. If you have questions, please call your primary care clinic.  If you do not have a primary care provider, please call  [phone] and they will assist you.        Care EveryWhere ID     This is your Care EveryWhere ID. This could be used by other organizations to access your [place] medical records  [id number]        Your Vitals Were     Last Period                   02/28/2017            Blood Pressure from Last 3 Encounters:   03/21/17 110/60   03/15/17 131/74   03/09/17 102/60    Weight from Last 3 Encounters:   03/21/17 54.3 kg (119 lb 11.2 oz)   03/15/17 52.6 kg (116 lb)   03/09/17 52.9 kg (116 lb 11.2 oz)              We Performed the Following     ACUPUNCTURE, 1+ NEEDLES, W/O ELECTRICAL STIM; INIT 15 MIN PERSONAL CONTACT     CHIROPRAC MANIP,SPINAL,1-2 REGIONS        Primary Care Provider Office Phone # Fax #    Renny Amaya [phone]       [facility] [address] [facility] [address]        Thank you!     Thank you for [facility] FOR [facility]  for your care. Our goal is always to provide you with excellent care. Hearing back from our patients is one way we can continue to improve our services. Please take a few minutes to complete the written survey that you may receive in the mail after your visit with us. Thank you!             Your Updated Medication List - Protect others around you: Learn how to safely use, store and throw away your medicines at www.disposemymeds.org.          This list is accurate as of: 3/29/17  9:23 AM.  Always use your most recent med list.                   Brand Name Dispense Instructions for use    ADVIL PO      Take 800 mg by mouth       citalopram 40 MG tablet    celeXA    90 tablet    Take 1 tablet (40 mg) by mouth daily INDICATION: TO TREAT ANXIETY       cyanocobalamin 1000 MCG/ML injection    VITAMIN B12    30 mL    Inject 1 mL (1,000 mcg) Subcutaneous every 14 days INDICATION: FOR VITAMIN B12 SUPPLEMENTATION       ferrous sulfate Dried 140 (45 FE) MG Tbcr     90 tablet    Take 1 tablet by mouth daily IRON  SUPPLEMENT - PLEASE TAKE WITH 4 OZ OF OJ WITH PULP, SUBSTITUTION OF IRON SUPPLEMENT PERMITTED       ondansetron 8 MG tablet    ZOFRAN    15 tablet    Take 1 tablet (8 mg) by mouth every 8 hours as needed for nausea       oxyCODONE 5 MG IR tablet    ROXICODONE    30 tablet    Take 1-2 tablets (5-10 mg) by mouth every 4 hours as needed for moderate to severe pain       vitamin D3 50543 UNITS capsule    CHOLECALCIFEROL    40 capsule    Take 1 capsule (50,000 Units) by mouth every 30 days NDICATION: TO TREAT VITAMIN D DEFICIENCY

## 2017-03-31 ENCOUNTER — TELEPHONE (OUTPATIENT)
Dept: FAMILY MEDICINE | Facility: CLINIC | Age: 42
End: 2017-03-31

## 2017-03-31 NOTE — TELEPHONE ENCOUNTER
Patient calling and states had laparoscopic RSO and L salpingectomy 3/15/17 and post-op 3/21/17 and is having bloody discharge and mild/moderate cramping in (R) ovary area.  Discharge is when she wipes and is wearing panty liner but not much on it.  Discharge started Wednesday.  Cramping started Wednesday and getting a little worse.  Has not taken any OTC for the cramping.  Went back to a week prior to symptoms and has not started running again or anything so not that causing pain.  Please advise.  Call her back at [phone].  Lissy Greene RN

## 2017-03-31 NOTE — TELEPHONE ENCOUNTER
Called patient and advised of below.  Patient agrees with plan.  Will call if further concerns.  Lissy Greene RN

## 2017-03-31 NOTE — TELEPHONE ENCOUNTER
This could be a period or just disruption in the hormones due to removal of one ovary. I don't have a good explanation for the cramping, other than it could be related to healing from the surgery. I don't think it sounds like anything concerning, so I would just try ibuprofen or tylenol as needed. Thanks.    Rashmi Bella MD

## 2017-04-12 DIAGNOSIS — E61.1 IRON DEFICIENCY: ICD-10-CM

## 2017-04-13 NOTE — TELEPHONE ENCOUNTER
ferrous sulfate      Last Written Prescription Date: 12/27/16  Last Fill Quantity: 90,  # refills: 0   Last Office Visit with G, [facility] or [facility] prescribing provider: 03/09/17

## 2017-04-17 ENCOUNTER — MYC MEDICAL ADVICE (OUTPATIENT)
Dept: OBGYN | Facility: CLINIC | Age: 42
End: 2017-04-17

## 2017-04-17 DIAGNOSIS — N92.6 IRREGULAR MENSTRUAL CYCLE: Primary | ICD-10-CM

## 2017-04-17 DIAGNOSIS — Z98.890 POSTOPERATIVE STATE: ICD-10-CM

## 2017-04-17 NOTE — TELEPHONE ENCOUNTER
Pt had lap salpingo-oophorectomy 03/15/17. See 03/31 telephone encounter regarding post-op vag bleeding.

## 2017-04-18 DIAGNOSIS — N92.6 IRREGULAR MENSTRUAL CYCLE: ICD-10-CM

## 2017-04-18 DIAGNOSIS — Z98.890 POSTOPERATIVE STATE: ICD-10-CM

## 2017-04-18 LAB
T4 FREE SERPL-MCNC: 0.87 NG/DL (ref 0.76–1.46)
TSH SERPL DL<=0.05 MIU/L-ACNC: 0.94 MU/L (ref 0.4–4)

## 2017-04-18 PROCEDURE — 36415 COLL VENOUS BLD VENIPUNCTURE: CPT | Performed by: OBSTETRICS & GYNECOLOGY

## 2017-04-18 PROCEDURE — 84439 ASSAY OF FREE THYROXINE: CPT | Performed by: OBSTETRICS & GYNECOLOGY

## 2017-04-18 PROCEDURE — 84443 ASSAY THYROID STIM HORMONE: CPT | Performed by: OBSTETRICS & GYNECOLOGY

## 2017-04-18 NOTE — TELEPHONE ENCOUNTER
Can do pelvic ultrasound and follow up appointment after that is read with me--also check tsh, t4. Thanks.  Rashmi Bella MD

## 2017-04-18 NOTE — TELEPHONE ENCOUNTER
My chart message sent to pt with md's directive.   I placed the orders for the labs and the US.   ASAEL Sanchez RN

## 2017-04-19 ENCOUNTER — RADIANT APPOINTMENT (OUTPATIENT)
Dept: ULTRASOUND IMAGING | Facility: CLINIC | Age: 42
End: 2017-04-19
Attending: OBSTETRICS & GYNECOLOGY
Payer: COMMERCIAL

## 2017-04-19 DIAGNOSIS — N92.6 IRREGULAR MENSTRUAL CYCLE: ICD-10-CM

## 2017-04-19 DIAGNOSIS — Z98.890 POSTOPERATIVE STATE: ICD-10-CM

## 2017-04-19 PROCEDURE — 76856 US EXAM PELVIC COMPLETE: CPT | Performed by: OBSTETRICS & GYNECOLOGY

## 2017-04-19 PROCEDURE — 76830 TRANSVAGINAL US NON-OB: CPT | Mod: 59 | Performed by: OBSTETRICS & GYNECOLOGY

## 2017-04-26 ENCOUNTER — THERAPY VISIT (OUTPATIENT)
Dept: CHIROPRACTIC MEDICINE | Facility: CLINIC | Age: 42
End: 2017-04-26
Payer: COMMERCIAL

## 2017-04-26 DIAGNOSIS — M99.01 CERVICAL SEGMENT DYSFUNCTION: ICD-10-CM

## 2017-04-26 DIAGNOSIS — M99.00 SEGMENTAL AND SOMATIC DYSFUNCTION OF HEAD REGION: ICD-10-CM

## 2017-04-26 DIAGNOSIS — M54.2 CERVICALGIA: Primary | ICD-10-CM

## 2017-04-26 DIAGNOSIS — R51.9 CEPHALGIA: ICD-10-CM

## 2017-04-26 DIAGNOSIS — M99.02 THORACIC SEGMENT DYSFUNCTION: ICD-10-CM

## 2017-04-26 PROCEDURE — 97810 ACUP 1/> WO ESTIM 1ST 15 MIN: CPT | Mod: GA | Performed by: CHIROPRACTOR

## 2017-04-26 PROCEDURE — 98941 CHIROPRACT MANJ 3-4 REGIONS: CPT | Mod: AT | Performed by: CHIROPRACTOR

## 2017-04-26 NOTE — MR AVS SNAPSHOT
After Visit Summary   4/26/2017    Olivia Flores    MRN: [id number]           Patient Information     Date Of Birth          1975        Visit Information        Provider Department      4/26/2017 8:30 AM Ned Dominguez DC [facility] [place] Chiropractor        Today's Diagnoses     Cervicalgia    -  1    Cervical segment dysfunction        Thoracic segment dysfunction        Cephalgia        Segmental and somatic dysfunction of head region           Follow-ups after your visit        Your next 10 appointments already scheduled     May 24, 2017  8:30 AM CDT   [facility] Chiropractor with Ned Dominguez DC   [facility] [place] Chiropractor ([place])    [address] [place] [address]  [address]  [place] [address] [phone]              Who to contact     If you have questions or need follow up information about today's clinic visit or your schedule please contact [facility] [place] [facility]IR[facility]RA[facility] directly at [phone].  Normal or non-critical lab and imaging results will be communicated to you by musiXmatchhart, letter or phone within 4 business days after the clinic has received the results. If you do not hear from us within 7 days, please contact the clinic through [employer] or phone. If you have a critical or abnormal lab result, we will notify you by phone as soon as possible.  Submit refill requests through [employer]x or call your pharmacy and they will forward the refill request to us. Please allow 3 business days for your refill to be completed.          Additional Information About Your Visit        musiXmatchhart Information     [employer]rax gives you secure access to your electronic health record. If you see a primary care provider, you can also send messages to your care team and make appointments. If you have questions, please call your primary care clinic.  If you do not have a primary care provider, please call  [phone] and they will assist you.        Care EveryWhere ID     This is your Care EveryWhere ID. This could be used by other organizations to access your [place] medical records  [id number]         Blood Pressure from Last 3 Encounters:   03/21/17 110/60   03/15/17 131/74   03/09/17 102/60    Weight from Last 3 Encounters:   03/21/17 54.3 kg (119 lb 11.2 oz)   03/15/17 52.6 kg (116 lb)   03/09/17 52.9 kg (116 lb 11.2 oz)              We Performed the Following     ACUPUNCTURE, 1+ NEEDLES, W/O ELECTRICAL STIM; INIT 15 MIN PERSONAL CONTACT     CHIROPRAC MANIP,SPINAL,3-4 REGIONS        Primary Care Provider Office Phone # Fax #    Renny Amaya [phone]       [facility] [address] [facility] [address]        Thank you!     Thank you for choosing [place] FOR ATHLETIC MEDICINE [facility]  for your care. Our goal is always to provide you with excellent care. Hearing back from our patients is one way we can continue to improve our services. Please take a few minutes to complete the written survey that you may receive in the mail after your visit with us. Thank you!             Your Updated Medication List - Protect others around you: Learn how to safely use, store and throw away your medicines at www.disposemymeds.org.          This list is accurate as of: 4/26/17 11:20 AM.  Always use your most recent med list.                   Brand Name Dispense Instructions for use    ADVIL PO      Take 800 mg by mouth       citalopram 40 MG tablet    celeXA    90 tablet    Take 1 tablet (40 mg) by mouth daily INDICATION: TO TREAT ANXIETY       cyanocobalamin 1000 MCG/ML injection    VITAMIN B12    30 mL    Inject 1 mL (1,000 mcg) Subcutaneous every 14 days INDICATION: FOR VITAMIN B12 SUPPLEMENTATION       ferrous sulfate 140 (45 FE) MG Tbcr CR tablet     90 tablet    TAKE ONE TABLET BY MOUTH EVERY DAY. PLEASE TAKE WITH 4OZ OF ORANGE JUICE WITH PULP       ondansetron  8 MG tablet    ZOFRAN    15 tablet    Take 1 tablet (8 mg) by mouth every 8 hours as needed for nausea       oxyCODONE 5 MG IR tablet    ROXICODONE    30 tablet    Take 1-2 tablets (5-10 mg) by mouth every 4 hours as needed for moderate to severe pain       vitamin D3 99681 UNITS capsule    CHOLECALCIFEROL    40 capsule    Take 1 capsule (50,000 Units) by mouth every 30 days NDICATION: TO TREAT VITAMIN D DEFICIENCY

## 2017-04-26 NOTE — PROGRESS NOTES
Visit #:  7/2017      Subjective:  Olivia Flores is a 40 year old female who is seen in f/u up for:        Cervicalgia  Cervical segment dysfunction  Thoracic segment dysfunction  Cephalgia  Segmental and somatic dysfunction of head region.     Since last visit on 10/19/2016,  Olivia Flores reports:    Area of chief complaint:  Cervical and Thoracic :  Symptoms are graded at 1-2/10. The quality is described as stiff, dull.  Motion has increased and the pt stable.  The pt reports continual healing from the surgery over one month ago. She had a severe migraine only one time where she was able to take migraine medication and bed rest to resolve the pain. The pt relates the pain to moderate stress as it seemed to trigger the pain. The pt is pleased with the stability.     Since last visit the patient feels that they are improved since the previous treatment-slight exacerbation today.       Objective:  The following was observed:    P: pain elicited on palpation: OCC R, C2, C3 right, T5, T6,   A: static palpation demonstrates intersegmental asymmetry OCC, R, C2, C3 right, T5, T6,    R: motion palpation notes restricted motion  T: hypertonicity at: Sub-occipital and T-spine paraspinal Bilaterally    Segmental spinal dysfunction/restrictions found at:  OCC R, C2, C3 right, T5, T6      Assessment:    Diagnoses:      1. Cervicalgia    2. Cervical segment dysfunction    3. Thoracic segment dysfunction    4. Cephalgia    5. Segmental and somatic dysfunction of head region        Patient's condition:  Slight exacerbation/regression    Treatment effectiveness:  Post manipulation there is better intersegmental movement and Patient claims to feel looser post manipulation      Procedures:  CMT:  26058 Chiropractic manipulative treatment 1-2  regions performed   Cervical: Activator, C2, C3 , C7 , Prone, Supine  Thoracic: T1/T2 prone diversified and activator    Modalities:  58329: Acupuncture:  Points: Lulu Points in  cervical spine jaycob points of the cervical and thoracic musculature 20 minutes prone    Therapeutic procedures:  None    Response to Treatment  Reduction of symptoms no increase in sx, pt stable    Prognosis: Guarded    Progress towards Goals: Patient is making progress towards the goal.The pt would like to run or bike without pain in the neck and upper back-the pt has reached this goal  Duration to achieve goal will be 4-8 weeks at a frequency of 1 per week       Recommendations:    Instructions:drink plenty of fluids    Follow-up:  Return to care if sx persist

## 2017-05-23 ENCOUNTER — OFFICE VISIT (OUTPATIENT)
Dept: INTERNAL MEDICINE | Facility: CLINIC | Age: 42
End: 2017-05-23
Payer: COMMERCIAL

## 2017-05-23 VITALS
BODY MASS INDEX: 21.03 KG/M2 | RESPIRATION RATE: 16 BRPM | DIASTOLIC BLOOD PRESSURE: 70 MMHG | HEART RATE: 62 BPM | SYSTOLIC BLOOD PRESSURE: 110 MMHG | OXYGEN SATURATION: 95 % | TEMPERATURE: 98.2 F | WEIGHT: 115 LBS
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
## 2017-05-23 DIAGNOSIS — E61.1 IRON DEFICIENCY: ICD-10-CM

## 2017-05-23 DIAGNOSIS — E53.8 VITAMIN B12 DEFICIENCY WITHOUT ANEMIA: ICD-10-CM

## 2017-05-23 DIAGNOSIS — E55.9 VITAMIN D DEFICIENCY: ICD-10-CM

## 2017-05-23 DIAGNOSIS — G47.00 PERSISTENT INSOMNIA: Primary | ICD-10-CM

## 2017-05-23 DIAGNOSIS — J30.89 PERENNIAL ALLERGIC RHINITIS, UNSPECIFIED ALLERGIC RHINITIS TRIGGER: ICD-10-CM

## 2017-05-23 LAB
ERYTHROCYTE [DISTWIDTH] IN BLOOD BY AUTOMATED COUNT: 12 % (ref 10–15)
FERRITIN SERPL-MCNC: 56 NG/ML (ref 12–150)
HCT VFR BLD AUTO: 40.6 % (ref 35–47)
HGB BLD-MCNC: 13.5 G/DL (ref 11.7–15.7)
IRON SATN MFR SERPL: 19 % (ref 15–46)
IRON SERPL-MCNC: 67 UG/DL (ref 35–180)
MCH RBC QN AUTO: 31.2 PG (ref 26.5–33)
MCHC RBC AUTO-ENTMCNC: 33.3 G/DL (ref 31.5–36.5)
MCV RBC AUTO: 94 FL (ref 78–100)
PLATELET # BLD AUTO: 289 10E9/L (ref 150–450)
RBC # BLD AUTO: 4.33 10E12/L (ref 3.8–5.2)
TIBC SERPL-MCNC: 356 UG/DL (ref 240–430)
WBC # BLD AUTO: 5.8 10E9/L (ref 4–11)

## 2017-05-23 PROCEDURE — 99214 OFFICE O/P EST MOD 30 MIN: CPT | Performed by: INTERNAL MEDICINE

## 2017-05-23 PROCEDURE — 36415 COLL VENOUS BLD VENIPUNCTURE: CPT | Performed by: INTERNAL MEDICINE

## 2017-05-23 PROCEDURE — 85027 COMPLETE CBC AUTOMATED: CPT | Performed by: INTERNAL MEDICINE

## 2017-05-23 PROCEDURE — 82306 VITAMIN D 25 HYDROXY: CPT | Performed by: INTERNAL MEDICINE

## 2017-05-23 PROCEDURE — 83540 ASSAY OF IRON: CPT | Performed by: INTERNAL MEDICINE

## 2017-05-23 PROCEDURE — 82728 ASSAY OF FERRITIN: CPT | Performed by: INTERNAL MEDICINE

## 2017-05-23 PROCEDURE — 83550 IRON BINDING TEST: CPT | Performed by: INTERNAL MEDICINE

## 2017-05-23 RX ORDER — CYANOCOBALAMIN 1000 UG/ML
1 INJECTION, SOLUTION INTRAMUSCULAR; SUBCUTANEOUS
Qty: 30 ML | Refills: 0 | Status: SHIPPED | OUTPATIENT
Start: 2017-05-23 | End: 2018-08-16

## 2017-05-23 RX ORDER — HYDROXYZINE HYDROCHLORIDE 25 MG/1
50-100 TABLET, FILM COATED ORAL
Qty: 60 TABLET | Refills: 3 | Status: SHIPPED | OUTPATIENT
Start: 2017-05-23 | End: 2018-03-21

## 2017-05-23 RX ORDER — CHOLECALCIFEROL (VITAMIN D3) 1250 MCG
50000 CAPSULE ORAL
Qty: 40 CAPSULE | Refills: 0 | Status: SHIPPED | OUTPATIENT
Start: 2017-05-23 | End: 2018-08-16

## 2017-05-23 NOTE — NURSING NOTE
"Chief Complaint   Patient presents with     Hospital F/U       Initial /70  Pulse 62  Temp 98.2  F (36.8  C) (Oral)  Resp 16  Wt 115 lb (52.2 kg)  SpO2 95%  BMI 21.03 kg/m2 Estimated body mass index is 21.03 kg/(m^2) as calculated from the following:    Height as of 3/21/17: 5' 2\" (1.575 m).    Weight as of this encounter: 115 lb (52.2 kg).  Blood pressure completed using cuff size: regular    "

## 2017-05-23 NOTE — PATIENT INSTRUCTIONS
Follow-up:  Please let me know how you're  Doing with regards to symptoms of insomnia, nasal congestion noted on exam today, and also the rash.  I doubt that we are dealing with Lyme, but if you develop any other symptoms please do not hesitate to let me know.  Tick Bite (Abx Tx)    Ticks are small insects that feed on the blood of rodents, rabbits, birds, deer, dogs and humans. The bite may cause a local reaction like that of a spider, with a small amount of local redness, itching and slight swelling. Sometimes there is no local reaction.  Most tick bites are harmless, but some ticks carry diseases, such as Lyme disease or [place] spotted fever, that can be passed to people at the time of the bite. Lyme disease is of greatest concern. At the present time, you have no symptoms of Lyme disease or other serious reaction to the bite. It is important to watch for the warning signs, which could appear weeks to months after the tick bite.  Home care  The following guidelines can help you care for your bite at home:  1. If itching is a problem, avoid tight clothing and anything that heats up your skin (hot showers/baths, direct sunlight). This often makes the itching worse. Use ice packs to help with redness and itching.  2. An ice pack (ice cubes in a plastic bag, wrapped in a towel) will reduce local areas of redness and itching. Over-the-counter creams containing benzocaine may help with itching.  3. If large areas of the skin are involved and if no other antihistamine was prescribed, over-the-counter antihistamines with diphenhydramine can be used. These are available at drug and grocery stores. These may be used to reduce itching if large areas of the skin are involved. If these are not helpful, talk to your doctor or pharmacist about other over-the counter medicines that may be helpful.  4. Antibiotics may be prescribed to reduce your risk of getting Lyme disease. It is very important that you take them  exactly as directed until they are completely finished.  Follow-up care  Follow up with your doctor or as advised.  When to seek medical care  Get prompt medical attention if any of the following occur:  Signs off local infection (the next few days) include:    Increasing redness around the bite site    Increased pain or swelling    Fever over 100.4 F (38.0 C)    Fluid draining from the bite area  Signs of tick-related disease (next few weeks to months) include:    Circular red ring-like rash appears at the bite area within 1 to 3 weeks    Tiredness, fever or chills, nausea or vomiting    Neck pain or stiffness, headache, confusion    Muscle, bone aching    Irregular or rapid heart beat    Joint pain or swelling (especially the knee joint)    Numbness or tingling or weakness in the arms or legs    Weakness on one side of the face    5085-8970 [employer]. [address] [place] [address]83. All rights reserved. This information is not intended as a substitute for professional medical care. Always follow your healthcare professional's instructions.        Lyme Disease  Lyme disease is caused by bacteria. The infection is most often passed during the bite of a deer tick. The tick is very small, so many people with Lyme disease do not know they have been bitten. Tests for Lyme disease are not always accurate early in the disease. If the disease is suspected, treatment may begin before testing confirms the infection. A long course of antibiotics is the standard treatment.  If untreated, Lyme disease can worsen and full-body symptoms can develop         Early local symptoms may appear within a few days to a month after the tick bite. These symptoms may include a round, red rash that looks like a bull's-eye target with darker outer ring and a darker center. There may fever, chills, fatigue, body aches, and headache. In time, the rash goes away, even without treatment. That doesn't mean the infection  has gone away, however. In some cases, early local symptoms never develop.    Early disseminated symptoms may appear weeks to months after the bite. These can include muscle aches, fatigue, fever, headache, stiff neck, and joint pain and swelling.    Late-stage symptoms include weakness in an arm, leg or one side of the face, headache, fever, and numbness and tingling in the arms or legs, confusion, and memory loss.  Testing is done for the presence of the bacteria. When the infection is treated early, it can be cured. In some cases, a second or third course of antibiotics may be needed. Be sure to follow your healthcare providers directions about treatment.  Home care  If oral antibiotics have been prescribed, take them exactly as directed until they are completely gone. Do not stop taking them until you have taken the full course or your healthcare provider has told you to stop.  Ask your healthcare provider about taking over-the-counter medicines to control symptoms such as aches and fever.  Follow-up care  Follow up with your healthcare provider as advised. Be sure to return for follow-up testing as directed to be sure the infection has been treated.  When to seek medical advice  Call your healthcare provider right away if any of the following occur:    Current symptoms get worse    Unexplained fever, neck pain or stiffness, or headache    Arm, leg or facial weakness    Joint pain or swelling    Numbness and tingling in the arms or legs    Confusion or memory loss    Irregular or rapid heartbeat        3258-7562 [employer]. [address] [place] [address]187. All rights reserved. This information is not intended as a substitute for professional medical care. Always follow your healthcare professional's instructions.

## 2017-05-23 NOTE — MR AVS SNAPSHOT
After Visit Summary   5/23/2017    Olivia Flores    MRN: [id number]           Patient Information     Date Of Birth          1975        Visit Information        Provider Department      5/23/2017 9:30 AM Renny Amaya MD [facility]        Today's Diagnoses     Persistent insomnia    -  1    Perennial allergic rhinitis, unspecified allergic rhinitis trigger        Insect bite, subsequent encounter          Care Instructions    Follow-up:  Please let me know how you're  Doing with regards to symptoms of insomnia, nasal congestion noted on exam today, and also the rash.  I doubt that we are dealing with Lyme, but if you develop any other symptoms please do not hesitate to let me know.  Tick Bite (Abx Tx)    Ticks are small insects that feed on the blood of rodents, rabbits, birds, deer, dogs and humans. The bite may cause a local reaction like that of a spider, with a small amount of local redness, itching and slight swelling. Sometimes there is no local reaction.  Most tick bites are harmless, but some ticks carry diseases, such as Lyme disease or [place] spotted fever, that can be passed to people at the time of the bite. Lyme disease is of greatest concern. At the present time, you have no symptoms of Lyme disease or other serious reaction to the bite. It is important to watch for the warning signs, which could appear weeks to months after the tick bite.  Home care  The following guidelines can help you care for your bite at home:  1. If itching is a problem, avoid tight clothing and anything that heats up your skin (hot showers/baths, direct sunlight). This often makes the itching worse. Use ice packs to help with redness and itching.  2. An ice pack (ice cubes in a plastic bag, wrapped in a towel) will reduce local areas of redness and itching. Over-the-counter creams containing benzocaine may help with itching.  3. If large areas of the skin are  involved and if no other antihistamine was prescribed, over-the-counter antihistamines with diphenhydramine can be used. These are available at drug and grocery stores. These may be used to reduce itching if large areas of the skin are involved. If these are not helpful, talk to your doctor or pharmacist about other over-the counter medicines that may be helpful.  4. Antibiotics may be prescribed to reduce your risk of getting Lyme disease. It is very important that you take them exactly as directed until they are completely finished.  Follow-up care  Follow up with your doctor or as advised.  When to seek medical care  Get prompt medical attention if any of the following occur:  Signs off local infection (the next few days) include:    Increasing redness around the bite site    Increased pain or swelling    Fever over 100.4 F (38.0 C)    Fluid draining from the bite area  Signs of tick-related disease (next few weeks to months) include:    Circular red ring-like rash appears at the bite area within 1 to 3 weeks    Tiredness, fever or chills, nausea or vomiting    Neck pain or stiffness, headache, confusion    Muscle, bone aching    Irregular or rapid heart beat    Joint pain or swelling (especially the knee joint)    Numbness or tingling or weakness in the arms or legs    Weakness on one side of the face    5365-9191 [employer]. [address] [place] [address]. All rights reserved. This information is not intended as a substitute for professional medical care. Always follow your healthcare professional's instructions.        Lyme Disease  Lyme disease is caused by bacteria. The infection is most often passed during the bite of a deer tick. The tick is very small, so many people with Lyme disease do not know they have been bitten. Tests for Lyme disease are not always accurate early in the disease. If the disease is suspected, treatment may begin before testing confirms the infection. A long  course of antibiotics is the standard treatment.  If untreated, Lyme disease can worsen and full-body symptoms can develop         Early local symptoms may appear within a few days to a month after the tick bite. These symptoms may include a round, red rash that looks like a bull's-eye target with darker outer ring and a darker center. There may fever, chills, fatigue, body aches, and headache. In time, the rash goes away, even without treatment. That doesn't mean the infection has gone away, however. In some cases, early local symptoms never develop.    Early disseminated symptoms may appear weeks to months after the bite. These can include muscle aches, fatigue, fever, headache, stiff neck, and joint pain and swelling.    Late-stage symptoms include weakness in an arm, leg or one side of the face, headache, fever, and numbness and tingling in the arms or legs, confusion, and memory loss.  Testing is done for the presence of the bacteria. When the infection is treated early, it can be cured. In some cases, a second or third course of antibiotics may be needed. Be sure to follow your healthcare providers directions about treatment.  Home care  If oral antibiotics have been prescribed, take them exactly as directed until they are completely gone. Do not stop taking them until you have taken the full course or your healthcare provider has told you to stop.  Ask your healthcare provider about taking over-the-counter medicines to control symptoms such as aches and fever.  Follow-up care  Follow up with your healthcare provider as advised. Be sure to return for follow-up testing as directed to be sure the infection has been treated.  When to seek medical advice  Call your healthcare provider right away if any of the following occur:    Current symptoms get worse    Unexplained fever, neck pain or stiffness, or headache    Arm, leg or facial weakness    Joint pain or swelling    Numbness and tingling in the arms or  legs    Confusion or memory loss    Irregular or rapid heartbeat    9085-0300 [employer]. [address] [place] [address], [place] [address]. All rights reserved. This information is not intended as a substitute for professional medical care. Always follow your healthcare professional's instructions.              Follow-ups after your visit        Your next 10 appointments already scheduled     May 24, 2017  8:30 AM CDT   [facility] with Ned Dominguez DC   [facility] [place] [facility] ([facility] [place])    [address] [place] [address]  [phone]  [place] [address] [phone]              Who to contact     If you have questions or need follow up information about today's clinic visit or your schedule please contact [facility] directly at [phone].  Normal or non-critical lab and imaging results will be communicated to you by MyChart, letter or phone within 4 business days after the clinic has received the results. If you do not hear from us within 7 days, please contact the clinic through [employer] or phone. If you have a critical or abnormal lab result, we will notify you by phone as soon as possible.  Submit refill requests through [employer] or call your pharmacy and they will forward the refill request to us. Please allow 3 business days for your refill to be completed.          Additional Information About Your Visit        MyChart Information     [employer] gives you secure access to your electronic health record. If you see a primary care provider, you can also send messages to your care team and make appointments. If you have questions, please call your primary care clinic.  If you do not have a primary care provider, please call [phone] and they will assist you.        Care EveryWhere ID     This is your Care EveryWhere ID. This could be used by other organizations to access your [facility] medical records  [id number]        Your  Vitals Were     Pulse Temperature Respirations Pulse Oximetry BMI (Body Mass Index)       62 98.2  F (36.8  C) (Oral) 16 95% 21.03 kg/m2        Blood Pressure from Last 3 Encounters:   05/23/17 110/70   03/21/17 110/60   03/15/17 131/74    Weight from Last 3 Encounters:   05/23/17 115 lb (52.2 kg)   03/21/17 119 lb 11.2 oz (54.3 kg)   03/15/17 116 lb (52.6 kg)              Today, you had the following     No orders found for display         Today's Medication Changes          These changes are accurate as of: 5/23/17 11:01 AM.  If you have any questions, ask your nurse or doctor.               Start taking these medicines.        Dose/Directions    hydrOXYzine 25 MG tablet   Commonly known as:  ATARAX   Used for:  Persistent insomnia, Perennial allergic rhinitis, unspecified allergic rhinitis trigger   Started by:  Renny Amaya MD        Dose:   mg   Take 2-4 tablets ( mg) by mouth nightly as needed for itching   Quantity:  60 tablet   Refills:  3         Stop taking these medicines if you haven't already. Please contact your care team if you have questions.     oxyCODONE 5 MG IR tablet   Commonly known as:  ROXICODONE   Stopped by:  Renny Amaya MD                Where to get your medicines      Some of these will need a paper prescription and others can be bought over the counter.  Ask your nurse if you have questions.     Bring a paper prescription for each of these medications     hydrOXYzine 25 MG tablet                Primary Care Provider Office Phone # Fax #    Renny Amaya [phone]       [facility] [address] [facility] [address]        Thank you!     Thank you for choosing [facility]  for your care. Our goal is always to provide you with excellent care. Hearing back from our patients is one way we can continue to improve our services. Please take a few minutes to complete the written survey that you may receive in  the mail after your visit with us. Thank you!             Your Updated Medication List - Protect others around you: Learn how to safely use, store and throw away your medicines at www.disposemymeds.org.          This list is accurate as of: 5/23/17 11:01 AM.  Always use your most recent med list.                   Brand Name Dispense Instructions for use    ADVIL PO      Take 800 mg by mouth Reported on 5/23/2017       citalopram 40 MG tablet    celeXA    90 tablet    Take 1 tablet (40 mg) by mouth daily INDICATION: TO TREAT ANXIETY       cyanocobalamin 1000 MCG/ML injection    VITAMIN B12    30 mL    Inject 1 mL (1,000 mcg) Subcutaneous every 14 days INDICATION: FOR VITAMIN B12 SUPPLEMENTATION       ferrous sulfate 140 (45 FE) MG Tbcr CR tablet     90 tablet    TAKE ONE TABLET BY MOUTH EVERY DAY. PLEASE TAKE WITH 4OZ OF ORANGE JUICE WITH PULP       hydrOXYzine 25 MG tablet    ATARAX    60 tablet    Take 2-4 tablets ( mg) by mouth nightly as needed for itching       ondansetron 8 MG tablet    ZOFRAN    15 tablet    Take 1 tablet (8 mg) by mouth every 8 hours as needed for nausea       vitamin D3 96016 UNITS capsule    CHOLECALCIFEROL    40 capsule    Take 1 capsule (50,000 Units) by mouth every 30 days NDICATION: TO TREAT VITAMIN D DEFICIENCY

## 2017-05-23 NOTE — PROGRESS NOTES
SUBJECTIVE:                                                    Olivia Flores is a 41 year old female who presents to clinic today for the following health issues:      ED/UC Followup:    Facility:  [facility]  Date of visit: 05/23/17  Reason for visit: skin  back  Current Status: same      She was seen in the emergency department at [facility] and was started on doxycycline for question tick bite. No foreign body removal was done. She did have a low-grade temp yesterday of 100.5 which has resolved at this time.  She denies any other symptoms  Including body aches and pains or any symptoms referrable to early Lyme disease.    Other significant issues as outlined and addressed in the plan section of this note.  She still continues to have issues insomnia but reports that her headaches have improved since she had right-sided oophorectomy. She also denies any history of allergic rhinitis but she is noted to have allergic and mild nasal congestion today.    Problem list and histories reviewed & adjusted, as indicated.  Additional history: as documented    Labs reviewed in EPIC    Reviewed and updated as needed this visit by clinical staff  Tobacco  Allergies  Meds  Med Hx  Surg Hx  Fam Hx  Soc Hx      Reviewed and updated as needed this visit by Provider         ROS:  14 point ROS negative except as above      OBJECTIVE:                                                    /70  Pulse 62  Temp 98.2  F (36.8  C) (Oral)  Resp 16  Wt 115 lb (52.2 kg)  SpO2 95%  BMI 21.03 kg/m2  Body mass index is 21.03 kg/(m^2).  GENERAL: healthy, alert and no distress  EYES: Eyes grossly normal to inspection, EOMI and bilateral allergic shiners noted  HENT: normal cephalic/atraumatic, ear canals and TM's normal, nasal mucosa edematous , oropharynx clear and oral mucous membranes moist  RESP: lungs clear to auscultation - no rales, rhonchi or wheezes  CV: regular rate and rhythm, normal S1 S2, no  S3 or S4, no murmur, click or rub, no peripheral edema and peripheral pulses strong  MS: no gross musculoskeletal defects noted, no edema    Diagnostic Test Results:  Results for orders placed or performed in visit on 04/19/17   US Pelvic Complete w Transvaginal    Narrative    [address] [address]  Tel. [phone]  Fax [phone]     ULTRASOUND - PELVIC GYN     Referring MD: Rashmi Bella   Primary Clinic: [facility]     ===================================     CLINICAL INFORMATION     Indications for ultrasound:   Bleeding/Menses - Metrorrhagia (irregular menses), Post Oophorectomy      LMP: 19 Mar 17 Hormones: none     Measurements:  Uterus: 7.6 x 3.5 x 4.0cm.   Position is anteverted. Contour is smth/reg.     Endo cav: 7 mm Smooth/regular/wnl  Cervix: Wnl, Nabothian Cysts noted     Right ovary: Surgically Removed   Left ovary: 3.4 x 1.6 x 1.7cm. Wnl     Cul de sac: no free fluid     *Other findings:      Right Adnexa: Wnl   Left Adnexa: Wnl     ===================================  Complete pelvic ultrasound utilizing both abdominal and vaginal   transducers. Right ovary is surgically absent.  Normal study.    ALPESH WARD M.D.         ASSESSMENT/PLAN:                                                      DIAGNOSIS/PLAN:     ICD-10-CM    1. Persistent insomnia G47.00 hydrOXYzine (ATARAX) 25 MG tablet   2. Perennial allergic rhinitis, unspecified allergic rhinitis trigger J30.89 hydrOXYzine (ATARAX) 25 MG tablet   3. Insect bite, subsequent encounter W57.XXXD     ON DOXYCYCLINE FOR ?? LYME ?       SIGNIFICANT ISSUES RE The primary encounter diagnosis was Persistent insomnia. Diagnoses of Perennial allergic rhinitis, unspecified allergic rhinitis trigger and Insect bite, subsequent encounter were also pertinent to this visit. AS NOTED AND ADDRESSED ABOVE   MEDS AND AND LABS AS ORDERED TO ADDRESS PREVIOUS AND CURRENT ABNORMAL INDICES    SEE PATIENT INSTRUCTION  SECTION FOR FOLLOW UP PLAN    Olivia IS TO CONTINUE OTHER TREATMENT REGIMEN/PLANS EXCEPT AS INDICATED    COMPLIANCE WITH MEDICATIONS DIET AND EXERCISE PLANS ENCOURAGED    DISCONTINUED MEDS:  Medications Discontinued During This Encounter   Medication Reason     oxyCODONE (ROXICODONE) 5 MG IR tablet        CURRENT MED LIST WITH CHANGES AS NOTED BELOW:  Current Outpatient Prescriptions   Medication Sig Dispense Refill     hydrOXYzine (ATARAX) 25 MG tablet Take 2-4 tablets ( mg) by mouth nightly as needed for itching 60 tablet 3     ferrous sulfate 140 (45 FE) MG TBCR CR tablet TAKE ONE TABLET BY MOUTH EVERY DAY. PLEASE TAKE WITH 4OZ OF ORANGE JUICE WITH PULP 90 tablet 3     cyanocobalamin (VITAMIN B12) 1000 MCG/ML injection Inject 1 mL (1,000 mcg) Subcutaneous every 14 days INDICATION: FOR VITAMIN B12 SUPPLEMENTATION 30 mL 0     citalopram (CELEXA) 40 MG tablet Take 1 tablet (40 mg) by mouth daily INDICATION: TO TREAT ANXIETY 90 tablet 3     vitamin D3 (CHOLECALCIFEROL) 60241 UNITS capsule Take 1 capsule (50,000 Units) by mouth every 30 days NDICATION: TO TREAT VITAMIN D DEFICIENCY 40 capsule 0     ondansetron (ZOFRAN) 8 MG tablet Take 1 tablet (8 mg) by mouth every 8 hours as needed for nausea (Patient not taking: Reported on 3/21/2017) 15 tablet 3     Ibuprofen (ADVIL PO) Take 800 mg by mouth Reported on 5/23/2017           Patient Instructions   Follow-up:  Please let me know how you're  Doing with regards to symptoms of insomnia, nasal congestion noted on exam today, and also the rash.  I doubt that we are dealing with Lyme, but if you develop any other symptoms please do not hesitate to let me know.      Renny Amaya MD  [facility]

## 2017-05-24 ENCOUNTER — THERAPY VISIT (OUTPATIENT)
Dept: CHIROPRACTIC MEDICINE | Facility: CLINIC | Age: 42
End: 2017-05-24
Payer: COMMERCIAL

## 2017-05-24 DIAGNOSIS — G43.119 INTRACTABLE MIGRAINE WITH AURA WITHOUT STATUS MIGRAINOSUS: ICD-10-CM

## 2017-05-24 DIAGNOSIS — M99.02 THORACIC SEGMENT DYSFUNCTION: ICD-10-CM

## 2017-05-24 DIAGNOSIS — M99.00 SEGMENTAL AND SOMATIC DYSFUNCTION OF HEAD REGION: ICD-10-CM

## 2017-05-24 DIAGNOSIS — M99.01 CERVICAL SEGMENT DYSFUNCTION: ICD-10-CM

## 2017-05-24 DIAGNOSIS — M54.2 CERVICALGIA: Primary | ICD-10-CM

## 2017-05-24 LAB — DEPRECATED CALCIDIOL+CALCIFEROL SERPL-MC: 71 UG/L (ref 20–75)

## 2017-05-24 PROCEDURE — 99000 SPECIMEN HANDLING OFFICE-LAB: CPT | Performed by: INTERNAL MEDICINE

## 2017-05-24 PROCEDURE — 98941 CHIROPRACT MANJ 3-4 REGIONS: CPT | Mod: AT | Performed by: CHIROPRACTOR

## 2017-05-24 PROCEDURE — 36415 COLL VENOUS BLD VENIPUNCTURE: CPT | Performed by: INTERNAL MEDICINE

## 2017-05-24 PROCEDURE — 97810 ACUP 1/> WO ESTIM 1ST 15 MIN: CPT | Mod: GA | Performed by: CHIROPRACTOR

## 2017-05-24 PROCEDURE — 82180 ASSAY OF ASCORBIC ACID: CPT | Mod: 90 | Performed by: INTERNAL MEDICINE

## 2017-05-24 NOTE — PROGRESS NOTES
Visit #:  8/2017      Subjective:  Olivia Flores is a 40 year old female who is seen in f/u up for:        Cervicalgia  Cervical segment dysfunction  Thoracic segment dysfunction  Segmental and somatic dysfunction of head region  Intractable migraine with aura without status migrainosus.     Since last visit on 10/19/2016,  Olivia Flores reports:    Area of chief complaint:  Cervical and Thoracic :  Symptoms are graded at 6-7/10. The quality is described as stiff, dull.  Motion has increased and the pt stable.  The pt reports continual healing from the surgery over 2-3 months ago. She had 2 migraines within the month and some tension in the R neck and shoulder. She cannot relate a specific incident that could have caused the px other than being in the sun. The pt denies weakness or other unusual sx.     Since last visit the patient feels that they are improved since the previous treatment-exacerbation/regression     Objective:  The following was observed:    P: pain elicited on palpation: OCC R, C2, C3 right, T5, T6,   A: static palpation demonstrates intersegmental asymmetry OCC, R, C2, C3 right, T5, T6,    R: motion palpation notes restricted motion  T: hypertonicity at: Sub-occipital and T-spine paraspinal Bilaterally    Segmental spinal dysfunction/restrictions found at:  OCC R, C2, C3 right, T5, T6      Assessment:    Diagnoses:      1. Cervicalgia    2. Cervical segment dysfunction    3. Thoracic segment dysfunction    4. Segmental and somatic dysfunction of head region    5. Intractable migraine with aura without status migrainosus        Patient's condition:   exacerbation/regression    Treatment effectiveness:  No increase in sx pt stable       Procedures:  CMT:  98407 Chiropractic manipulative treatment 1-2  regions performed   Cervical: Activator, C2, C3 , C7 , Prone, Supine  Thoracic: T1/T2 prone diversified and activator  Occiput: L OCC prone activator     Modalities:  86302: Acupuncture:   Points: Lulu Points in cervical spine jaycob points of the cervical and thoracic musculature 20 minutes prone    Therapeutic procedures:  None    Response to Treatment  Reduction of symptoms no increase in sx, pt stable    Prognosis: Guarded    Progress towards Goals: Patient is making progress towards the goal.The pt would like to run or bike without pain in the neck and upper back-the pt has reached this goal  Duration to achieve goal will be 4-8 weeks at a frequency of 1 per week       Recommendations:    Instructions:drink plenty of fluids    Follow-up:  Return to care 4-6 weeks

## 2017-05-24 NOTE — MR AVS SNAPSHOT
After Visit Summary   5/24/2017    Olivia Flores    MRN: [id number]           Patient Information     Date Of Birth          1975        Visit Information        Provider Department      5/24/2017 8:30 AM Ned Dominguez DC [facility] - [place] [facility]        Today's Diagnoses     Cervicalgia    -  1    Cervical segment dysfunction        Thoracic segment dysfunction        Segmental and somatic dysfunction of head region        Intractable migraine with aura without status migrainosus           Follow-ups after your visit        Your next 10 appointments already scheduled     Jun 21, 2017  8:30 AM CDT   [facility] Chiropractor with Ned Dominguez DC   [facility]  [place] [place] [facility] (DOUGLAS [facility] [place])    [address] [place] [address]  [id number]  [place] [address] [phone]              Who to contact     If you have questions or need follow up information about today's clinic visit or your schedule please contact [facility] [place] [facility] directly at [phone].  Normal or non-critical lab and imaging results will be communicated to you by Karma Platformhart, letter or phone within 4 business days after the clinic has received the results. If you do not hear from us within 7 days, please contact the clinic through Rocket.Lat or phone. If you have a critical or abnormal lab result, we will notify you by phone as soon as possible.  Submit refill requests through AdBira Network or call your pharmacy and they will forward the refill request to us. Please allow 3 business days for your refill to be completed.          Additional Information About Your Visit        Karma Platformhart Information     [employer] Network gives you secure access to your electronic health record. If you see a primary care provider, you can also send messages to your care team and make appointments. If you have questions, please call your primary care clinic.  If you  do not have a primary care provider, please call [phone] and they will assist you.        Care EveryWhere ID     This is your Care EveryWhere ID. This could be used by other organizations to access your [place] medical records  [id number]         Blood Pressure from Last 3 Encounters:   05/23/17 110/70   03/21/17 110/60   03/15/17 131/74    Weight from Last 3 Encounters:   05/23/17 52.2 kg (115 lb)   03/21/17 54.3 kg (119 lb 11.2 oz)   03/15/17 52.6 kg (116 lb)              We Performed the Following     ACUPUNCTURE, 1+ NEEDLES, W/O ELECTRICAL STIM; INIT 15 MIN PERSONAL CONTACT     CHIROPRAC MANIP,SPINAL,3-4 REGIONS        Primary Care Provider Office Phone # Fax #    Renny Amaya [phone]       [facility] [address] [facility] [address]        Thank you!     Thank you for [facility] FOR [facility]TH[facility]ICINE [facility]  for your care. Our goal is always to provide you with excellent care. Hearing back from our patients is one way we can continue to improve our services. Please take a few minutes to complete the written survey that you may receive in the mail after your visit with us. Thank you!             Your Updated Medication List - Protect others around you: Learn how to safely use, store and throw away your medicines at www.disposemymeds.org.          This list is accurate as of: 5/24/17 12:53 PM.  Always use your most recent med list.                   Brand Name Dispense Instructions for use    ADVIL PO      Take 800 mg by mouth Reported on 5/23/2017       citalopram 40 MG tablet    celeXA    90 tablet    Take 1 tablet (40 mg) by mouth daily INDICATION: TO TREAT ANXIETY       cyanocobalamin 1000 MCG/ML injection    VITAMIN B12    30 mL    Inject 1 mL (1,000 mcg) Subcutaneous every 14 days INDICATION: FOR VITAMIN B12 SUPPLEMENTATION       ferrous sulfate 140 (45 FE) MG Tbcr CR tablet     90 tablet    TAKE ONE TABLET BY MOUTH EVERY DAY.  PLEASE TAKE WITH 4OZ OF ORANGE JUICE WITH PULP       hydrOXYzine 25 MG tablet    ATARAX    60 tablet    Take 2-4 tablets ( mg) by mouth nightly as needed for itching       ondansetron 8 MG tablet    ZOFRAN    15 tablet    Take 1 tablet (8 mg) by mouth every 8 hours as needed for nausea       vitamin D3 75303 UNITS capsule    CHOLECALCIFEROL    40 capsule    Take 1 capsule (50,000 Units) by mouth every 30 days NDICATION: TO TREAT VITAMIN D DEFICIENCY

## 2017-05-26 LAB — VIT C SERPL-MCNC: 80 MG/DL

## 2017-06-28 ENCOUNTER — THERAPY VISIT (OUTPATIENT)
Dept: CHIROPRACTIC MEDICINE | Facility: CLINIC | Age: 42
End: 2017-06-28
Payer: COMMERCIAL

## 2017-06-28 DIAGNOSIS — G44.201 INTRACTABLE TENSION-TYPE HEADACHE, UNSPECIFIED CHRONICITY PATTERN: ICD-10-CM

## 2017-06-28 DIAGNOSIS — M99.01 CERVICAL SEGMENT DYSFUNCTION: ICD-10-CM

## 2017-06-28 DIAGNOSIS — M99.02 THORACIC SEGMENT DYSFUNCTION: ICD-10-CM

## 2017-06-28 DIAGNOSIS — M54.2 CERVICALGIA: Primary | ICD-10-CM

## 2017-06-28 DIAGNOSIS — M99.00 SEGMENTAL AND SOMATIC DYSFUNCTION OF HEAD REGION: ICD-10-CM

## 2017-06-28 DIAGNOSIS — G43.119 INTRACTABLE MIGRAINE WITH AURA WITHOUT STATUS MIGRAINOSUS: ICD-10-CM

## 2017-06-28 PROCEDURE — 97810 ACUP 1/> WO ESTIM 1ST 15 MIN: CPT | Mod: GA | Performed by: CHIROPRACTOR

## 2017-06-28 PROCEDURE — 98940 CHIROPRACT MANJ 1-2 REGIONS: CPT | Mod: AT | Performed by: CHIROPRACTOR

## 2017-06-28 NOTE — PROGRESS NOTES
Visit #:  9/2017      Subjective:  Olivia Flores is a 40 year old female who is seen in f/u up for:        Cervicalgia  Cervical segment dysfunction  Thoracic segment dysfunction  Intractable tension-type headache, unspecified chronicity pattern  Segmental and somatic dysfunction of head region  Intractable migraine with aura without status migrainosus.     Since last visit,   Olivia Flores reports:    Area of chief complaint:  Cervical and Thoracic :  Symptoms are graded at 5/10. The quality is described as stiff, dull.  Motion has increased, however restricted in the past several weeks. The pt reports a slight increase in HA sx ( 2-3) in the past 5 weeks. She relates the increase to stress at work in addition to her cycle. The px seems to be on the R side of the neck and mid back area. She denies weakness in the extremity or other unusual sx.     Since last visit the patient feels that they are improved since the previous treatment slight exacerbation/regression     Objective:  The following was observed:    P: pain elicited on palpation: OCC R, C2, C3 right, T5, T6,   A: static palpation demonstrates intersegmental asymmetry OCC, R, C2, C3 right, T5, T6,    R: motion palpation notes restricted motion  T: hypertonicity at: Sub-occipital and T-spine paraspinal Bilaterally    Segmental spinal dysfunction/restrictions found at:  OCC R, C2, C3 right, T5, T6      Assessment:    Diagnoses:      1. Cervicalgia    2. Cervical segment dysfunction    3. Thoracic segment dysfunction    4. Intractable tension-type headache, unspecified chronicity pattern    5. Segmental and somatic dysfunction of head region    6. Intractable migraine with aura without status migrainosus        Patient's condition:   exacerbation/regression    Treatment effectiveness:  No increase in sx pt stable       Procedures:  CMT:  13429 Chiropractic manipulative treatment 1-2  regions performed   Cervical: Activator, C2, C3 , C7 , Prone,  Supine  Thoracic: T1/T2 prone diversified and activator  Occiput: L OCC prone activator     Modalities:  69695: Acupuncture:  Points: Lulu Points in cervical spine jaycob points of the cervical and thoracic musculature 20 minutes prone    Therapeutic procedures:  None    Response to Treatment  Reduction of symptoms no increase in sx, pt stable    Prognosis: Guarded    Progress towards Goals: Patient is making progress towards the goal.The pt would like to run or bike without pain in the neck and upper back-the pt has reached this goal  Duration to achieve goal will be 4-8 weeks at a frequency of 1 per week       Recommendations:    Instructions:drink plenty of fluids    Follow-up:  Return to care 4 weeks for stability

## 2017-06-28 NOTE — MR AVS SNAPSHOT
After Visit Summary   6/28/2017    Olivia Flores    MRN: [id number]           Patient Information     Date Of Birth          1975        Visit Information        Provider Department      6/28/2017 8:30 AM Ned Dominguez DC [place] [facility] - [place] [facility]        Today's Diagnoses     Cervicalgia    -  1    Cervical segment dysfunction        Thoracic segment dysfunction        Intractable tension-type headache, unspecified chronicity pattern        Segmental and somatic dysfunction of head region        Intractable migraine with aura without status migrainosus           Follow-ups after your visit        Who to contact     If you have questions or need follow up information about today's clinic visit or your schedule please contact [place] [facility] [place] [facility] directly at [phone].  Normal or non-critical lab and imaging results will be communicated to you by MyChart, letter or phone within 4 business days after the clinic has received the results. If you do not hear from us within 7 days, please contact the clinic through ScheduleSofthart or phone. If you have a critical or abnormal lab result, we will notify you by phone as soon as possible.  Submit refill requests through Heyzap or call your pharmacy and they will forward the refill request to us. Please allow 3 business days for your refill to be completed.          Additional Information About Your Visit        MyChart Information     Heyzap gives you secure access to your electronic health record. If you see a primary care provider, you can also send messages to your care team and make appointments. If you have questions, please call your primary care clinic.  If you do not have a primary care provider, please call [phone] and they will assist you.        Care EveryWhere ID     This is your Care EveryWhere ID. This could be used by other organizations to access your  [place] medical records  [id number]         Blood Pressure from Last 3 Encounters:   05/23/17 110/70   03/21/17 110/60   03/15/17 131/74    Weight from Last 3 Encounters:   05/23/17 52.2 kg (115 lb)   03/21/17 54.3 kg (119 lb 11.2 oz)   03/15/17 52.6 kg (116 lb)              We Performed the Following     ACUPUNCTURE, 1+ NEEDLES, W/O ELECTRICAL STIM; INIT 15 MIN PERSONAL CONTACT     CHIROPRAC MANIP,SPINAL,1-2 REGIONS        Primary Care Provider Office Phone # Fax #    Renny Amaya [phone]       [facility] [address] [facility] [address]        Equal Access to Services     ROYCE TORO : Hadii jose luis luna hadasho Soomaali, waaxda luqadaha, qaybta kaalmada adeegyada, felipa neal. So [facility] [phone].    ATENCIÓN: Si habla español, tiene a hernández disposición servicios gratuitos de asistencia lingüística. [facility] [phone].    We comply with applicable federal civil rights laws and [place] laws. We do not discriminate on the basis of race, color, national origin, age, disability sex, sexual orientation or gender identity.            Thank you!     Thank you for choosing [place] [facility]  for your care. Our goal is always to provide you with excellent care. Hearing back from our patients is one way we can continue to improve our services. Please take a few minutes to complete the written survey that you may receive in the mail after your visit with us. Thank you!             Your Updated Medication List - Protect others around you: Learn how to safely use, store and throw away your medicines at www.disposemymeds.org.          This list is accurate as of: 6/28/17  8:53 AM.  Always use your most recent med list.                   Brand Name Dispense Instructions for use Diagnosis    ADVIL PO      Take 800 mg by mouth Reported on 5/23/2017        citalopram 40 MG tablet    celeXA    90 tablet    Take 1  tablet (40 mg) by mouth daily INDICATION: TO TREAT ANXIETY    Anxiety       cyanocobalamin 1000 MCG/ML injection    VITAMIN B12    30 mL    Inject 1 mL (1,000 mcg) Subcutaneous every 14 days INDICATION: FOR VITAMIN B12 SUPPLEMENTATION    Vitamin B12 deficiency without anemia       ferrous sulfate 140 (45 FE) MG Tbcr CR tablet     90 tablet    TAKE ONE TABLET BY MOUTH EVERY DAY. PLEASE TAKE WITH 4OZ OF ORANGE JUICE WITH PULP    Iron deficiency       hydrOXYzine 25 MG tablet    ATARAX    60 tablet    Take 2-4 tablets ( mg) by mouth nightly as needed for itching    Persistent insomnia, Perennial allergic rhinitis, unspecified allergic rhinitis trigger       ondansetron 8 MG tablet    ZOFRAN    15 tablet    Take 1 tablet (8 mg) by mouth every 8 hours as needed for nausea    Nausea       vitamin D3 46614 UNITS capsule    CHOLECALCIFEROL    40 capsule    Take 1 capsule (50,000 Units) by mouth every 30 days NDICATION: TO TREAT VITAMIN D DEFICIENCY    Vitamin D deficiency

## 2017-07-19 ENCOUNTER — THERAPY VISIT (OUTPATIENT)
Dept: CHIROPRACTIC MEDICINE | Facility: CLINIC | Age: 42
End: 2017-07-19
Payer: COMMERCIAL

## 2017-07-19 DIAGNOSIS — M99.02 THORACIC SEGMENT DYSFUNCTION: ICD-10-CM

## 2017-07-19 DIAGNOSIS — G43.119 INTRACTABLE MIGRAINE WITH AURA WITHOUT STATUS MIGRAINOSUS: ICD-10-CM

## 2017-07-19 DIAGNOSIS — G44.201 INTRACTABLE TENSION-TYPE HEADACHE, UNSPECIFIED CHRONICITY PATTERN: ICD-10-CM

## 2017-07-19 DIAGNOSIS — M99.00 SEGMENTAL AND SOMATIC DYSFUNCTION OF HEAD REGION: ICD-10-CM

## 2017-07-19 DIAGNOSIS — M54.2 CERVICALGIA: Primary | ICD-10-CM

## 2017-07-19 DIAGNOSIS — M99.01 CERVICAL SEGMENT DYSFUNCTION: ICD-10-CM

## 2017-07-19 PROCEDURE — 98940 CHIROPRACT MANJ 1-2 REGIONS: CPT | Mod: AT | Performed by: CHIROPRACTOR

## 2017-07-19 PROCEDURE — 97810 ACUP 1/> WO ESTIM 1ST 15 MIN: CPT | Mod: GA | Performed by: CHIROPRACTOR

## 2017-07-19 NOTE — MR AVS SNAPSHOT
After Visit Summary   7/19/2017    Olivia Flores    MRN: [id number]           Patient Information     Date Of Birth          1975        Visit Information        Provider Department      7/19/2017 8:30 AM Ned Dominguez DC [facility] [place] [facility]        Today's Diagnoses     Cervicalgia    -  1    Cervical segment dysfunction        Thoracic segment dysfunction        Intractable tension-type headache, unspecified chronicity pattern        Segmental and somatic dysfunction of head region        Intractable migraine with aura without status migrainosus           Follow-ups after your visit        Your next 10 appointments already scheduled     Aug 02, 2017  8:30 AM CDT   DOUGLAS Chiropractor with Ned Dominguez DC   [facility] [place] [facility] ([facility] [place])    [address] [place] [address]  [address]  [place] [address]   [phone]            Aug 23, 2017  8:30 AM CDT   DOUGLAS Chiropractor with Ned Dominguez DC   [facility] [place] [facility] ([facility] [place])    [address] [place] [address]  [address]  [place] [address]   [phone]              Who to contact     If you have questions or need follow up information about today's clinic visit or your schedule please [facility] directly at [phone].  Normal or non-critical lab and imaging results will be communicated to you by MyChart, letter or phone within 4 business days after the clinic has received the results. If you do not hear from us within 7 days, please contact the clinic through MyChart or phone. If you have a critical or abnormal lab result, we will notify you by phone as soon as possible.  Submit refill requests through [employer] or call your pharmacy and they will forward the refill request to us. Please allow 3 business days for your refill to be completed.           Additional Information About Your Visit        MyChart Information     T[employer] gives you secure access to your electronic health record. If you see a primary care provider, you can also send messages to your care team and make appointments. If you have questions, please call your primary care clinic.  If you do not have a primary care provider, please call [phone] and they will assist you.        Care EveryWhere ID     This is your Care EveryWhere ID. This could be used by other organizations to access your [place] medical records  [id number]         Blood Pressure from Last 3 Encounters:   05/23/17 110/70   03/21/17 110/60   03/15/17 131/74    Weight from Last 3 Encounters:   05/23/17 52.2 kg (115 lb)   03/21/17 54.3 kg (119 lb 11.2 oz)   03/15/17 52.6 kg (116 lb)              We Performed the Following     ACUPUNCTURE, 1+ NEEDLES, W/O ELECTRICAL STIM; INIT 15 MIN PERSONAL CONTACT     CHIROPRAC MANIP,SPINAL,1-2 REGIONS        Primary Care Provider Office Phone # Fax #    Renny Amaya [phone]       [facility] [address] [facility] [address]        Equal Access to Services     ROYCE TORO AH: Hadii jose luis luna hadasho Soomaali, waaxda luqadaha, qaybta kaalmada adeegyada, felipa neal. So [facility] [phone].    ATENCIÓN: Si habla español, tiene a hernández disposición servicios gratuitos de asistencia lingüística. Ami al [phone].    We comply with applicable federal civil rights laws and [place] laws. We do not discriminate on the basis of race, color, national origin, age, disability sex, sexual orientation or gender identity.            Thank you!     Thank you for choosing [facility]  for your care. Our goal is always to provide you with excellent care. Hearing back from our patients is one way we can continue to improve our services. Please take a few minutes to complete the written survey  that you may receive in the mail after your visit with us. Thank you!             Your Updated Medication List - Protect others around you: Learn how to safely use, store and throw away your medicines at www.disposemymeds.org.          This list is accurate as of: 7/19/17  1:11 PM.  Always use your most recent med list.                   Brand Name Dispense Instructions for use Diagnosis    ADVIL PO      Take 800 mg by mouth Reported on 5/23/2017        citalopram 40 MG tablet    celeXA    90 tablet    Take 1 tablet (40 mg) by mouth daily INDICATION: TO TREAT ANXIETY    Anxiety       cyanocobalamin 1000 MCG/ML injection    VITAMIN B12    30 mL    Inject 1 mL (1,000 mcg) Subcutaneous every 14 days INDICATION: FOR VITAMIN B12 SUPPLEMENTATION    Vitamin B12 deficiency without anemia       ferrous sulfate 140 (45 FE) MG Tbcr CR tablet     90 tablet    TAKE ONE TABLET BY MOUTH EVERY DAY. PLEASE TAKE WITH 4OZ OF ORANGE JUICE WITH PULP    Iron deficiency       hydrOXYzine 25 MG tablet    ATARAX    60 tablet    Take 2-4 tablets ( mg) by mouth nightly as needed for itching    Persistent insomnia, Perennial allergic rhinitis, unspecified allergic rhinitis trigger       ondansetron 8 MG tablet    ZOFRAN    15 tablet    Take 1 tablet (8 mg) by mouth every 8 hours as needed for nausea    Nausea       vitamin D3 16123 UNITS capsule    CHOLECALCIFEROL    40 capsule    Take 1 capsule (50,000 Units) by mouth every 30 days NDICATION: TO TREAT VITAMIN D DEFICIENCY    Vitamin D deficiency

## 2017-07-19 NOTE — PROGRESS NOTES
Visit #:  10/2017      Subjective:  Olivia Flores is a 40 year old female who is seen in f/u up for:        Cervicalgia  Cervical segment dysfunction  Thoracic segment dysfunction  Intractable tension-type headache, unspecified chronicity pattern  Segmental and somatic dysfunction of head region  Intractable migraine with aura without status migrainosus.     Since last visit,   Olivia Flores reports:    Area of chief complaint:  Cervical and Thoracic :  Symptoms are graded at 4-5/10. The quality is described as stiff, dull.  Motion has increased, however restricted in the past several weeks. The pt reports a slight increase in HA sx. She cannot relate a specific incident that could have caused the px. She feels it ro be dehydration or a change in medication. She states the HA have been mild and she has not experienced migraine sx. The pt denies weakness or other unusual sx.     Since last visit the patient feels that they are improved since the previous treatment slight exacerbation/regression     Objective:  The following was observed:    P: pain elicited on palpation: OCC R, C2, C3 right, T5, T6,   A: static palpation demonstrates intersegmental asymmetry OCC, R, C2, C3 right, T5, T6,    R: motion palpation notes restricted motion  T: hypertonicity at: Sub-occipital and T-spine paraspinal Bilaterally    Segmental spinal dysfunction/restrictions found at:  OCC R, C2, C3 right, T5, T6      Assessment:    Diagnoses:      1. Cervicalgia    2. Cervical segment dysfunction    3. Thoracic segment dysfunction    4. Intractable tension-type headache, unspecified chronicity pattern    5. Segmental and somatic dysfunction of head region    6. Intractable migraine with aura without status migrainosus        Patient's condition:   exacerbation/regression    Treatment effectiveness:  No increase in sx pt stable       Procedures:  CMT:  01060 Chiropractic manipulative treatment 1-2  regions performed   Cervical:  diversified  C2, C3 , C7 , Prone, Supine  Thoracic: T1/T2 prone diversified and activator  Occiput: L OCC prone activator     Modalities:  75072: Acupuncture:  Points: Lulu Points in cervical spine jaycob points of the cervical and thoracic musculature 20 minutes prone    Therapeutic procedures:  None    Response to Treatment  Reduction of symptoms no increase in sx, pt stable    Prognosis: Guarded    Progress towards Goals: Patient is making progress towards the goal.The pt would like to run or bike without pain in the neck and upper back-the pt has reached this goal  Duration to achieve goal will be 4-8 weeks at a frequency of 1 per week       Recommendations:    Instructions:drink plenty of fluids    Follow-up:  Return to care in 2 weeks for stability

## 2017-08-08 ENCOUNTER — E-VISIT (OUTPATIENT)
Dept: INTERNAL MEDICINE | Facility: CLINIC | Age: 42
End: 2017-08-08

## 2017-08-08 DIAGNOSIS — B00.1 RECURRENT COLD SORES: Primary | ICD-10-CM

## 2017-08-08 RX ORDER — VALACYCLOVIR HYDROCHLORIDE 1 G/1
2000 TABLET, FILM COATED ORAL 2 TIMES DAILY
Qty: 4 TABLET | Refills: 0 | Status: SHIPPED | OUTPATIENT
Start: 2017-08-08 | End: 2018-03-21

## 2017-09-20 ENCOUNTER — THERAPY VISIT (OUTPATIENT)
Dept: CHIROPRACTIC MEDICINE | Facility: CLINIC | Age: 42
End: 2017-09-20
Payer: COMMERCIAL

## 2017-09-20 DIAGNOSIS — M54.2 CERVICALGIA: Primary | ICD-10-CM

## 2017-09-20 DIAGNOSIS — M99.02 THORACIC SEGMENT DYSFUNCTION: ICD-10-CM

## 2017-09-20 DIAGNOSIS — M99.00 SEGMENTAL AND SOMATIC DYSFUNCTION OF HEAD REGION: ICD-10-CM

## 2017-09-20 DIAGNOSIS — G44.201 INTRACTABLE TENSION-TYPE HEADACHE, UNSPECIFIED CHRONICITY PATTERN: ICD-10-CM

## 2017-09-20 DIAGNOSIS — M99.01 CERVICAL SEGMENT DYSFUNCTION: ICD-10-CM

## 2017-09-20 DIAGNOSIS — G43.119 INTRACTABLE MIGRAINE WITH AURA WITHOUT STATUS MIGRAINOSUS: ICD-10-CM

## 2017-09-20 PROCEDURE — 97810 ACUP 1/> WO ESTIM 1ST 15 MIN: CPT | Mod: GA | Performed by: CHIROPRACTOR

## 2017-09-20 PROCEDURE — 98940 CHIROPRACT MANJ 1-2 REGIONS: CPT | Mod: AT | Performed by: CHIROPRACTOR

## 2017-09-20 NOTE — PROGRESS NOTES
Visit #:  11/2017      Subjective:  Olivia Flores is a 40 year old female who is seen in f/u up for:        Cervicalgia  Cervical segment dysfunction  Thoracic segment dysfunction  Intractable tension-type headache, unspecified chronicity pattern  Segmental and somatic dysfunction of head region  Intractable migraine with aura without status migrainosus.     Since last visit,   Olivia Flores reports:    Area of chief complaint:  Cervical and Thoracic :  Symptoms are graded at 6/10. The quality is described as stiff, dull.  Motion has increased, however restricted in the past several weeks. The pt reports 8 weeks with an increase in HA sx. She notes 2 migraines due to stress as her  was in the hospital with a concussion. She states on HA last almost 2 weeks. Pain was on both sides of the neck. She denies weakness in the extremities or other unusual sx.     Since last visit the patient feels that they are worse    Objective:  The following was observed:    P: pain elicited on palpation: OCC R, C2, C3 right, T5, T6,   A: static palpation demonstrates intersegmental asymmetry OCC, R, C2, C3 right, T5, T6,    R: motion palpation notes restricted motion  T: hypertonicity at: Sub-occipital and T-spine paraspinal Bilaterally    Segmental spinal dysfunction/restrictions found at:  OCC R, C2, C3 right, T5, T6      Assessment:    Diagnoses:      1. Cervicalgia    2. Cervical segment dysfunction    3. Thoracic segment dysfunction    4. Intractable tension-type headache, unspecified chronicity pattern    5. Segmental and somatic dysfunction of head region    6. Intractable migraine with aura without status migrainosus        Patient's condition:   exacerbation/regression    Treatment effectiveness:  No increase in sx pt stable       Procedures:  CMT:  91866 Chiropractic manipulative treatment 1-2  regions performed     Thoracic: T1/T2, T7, T8 prone diversified and activator  Occiput: L OCC prone activator      Modalities:  75883: Acupuncture:  Points: Lulu Points in cervical spine jaycob points of the cervical and thoracic musculature 20 minutes prone    Therapeutic procedures:  None    Response to Treatment  Reduction of symptoms no increase in sx, pt stable    Prognosis: Guarded    Progress towards Goals: Patient is making progress towards the goal.The pt would like to run or bike without pain in the neck and upper back-the pt has reached this goal  Duration to achieve goal will be 4-8 weeks at a frequency of 1 per week       Recommendations:    Instructions:drink plenty of fluids    Follow-up:  Return to care in 1-2 weeks for stability

## 2017-09-20 NOTE — MR AVS SNAPSHOT
After Visit Summary   9/20/2017    Olivia Flores    MRN: [id number]           Patient Information     Date Of Birth          1975        Visit Information        Provider Department      9/20/2017 8:30 AM Ned Dominguez DC [facility] [place] [facility]        Today's Diagnoses     Cervicalgia    -  1    Cervical segment dysfunction        Thoracic segment dysfunction        Intractable tension-type headache, unspecified chronicity pattern        Segmental and somatic dysfunction of head region        Intractable migraine with aura without status migrainosus           Follow-ups after your visit        Your next 10 appointments already scheduled     Oct 04, 2017  8:30 AM CDT   DOUGLAS Chiropractor with Ned Dominguez DC   [facility] [place] [facility] ([facility] [place])    [address] [place] [address]  [address]  [place] [address]   [phone]            Oct 18, 2017  8:30 AM CDT   DOUGLAS Chiropractor with Ned Dominguez DC   [facility] [place] [facility] ([facility] [place])    [address] [place] [address]  [address]  [place] [address]   [phone]              Who to contact     If you have questions or need follow up information about today's clinic visit or your schedule please contact [facility] directly at [phone].  Normal or non-critical lab and imaging results will be communicated to you by MyChart, letter or phone within 4 business days after the clinic has received the results. If you do not hear from us within 7 days, please contact the clinic through MyChart or phone. If you have a critical or abnormal lab result, we will notify you by phone as soon as possible.  Submit refill requests through [employer] or call your pharmacy and they will forward the refill request to us. Please allow 3 business days for your refill to be completed.           Additional Information About Your Visit        MyChart Information     [employer] gives you secure access to your electronic health record. If you see a primary care provider, you can also send messages to your care team and make appointments. If you have questions, please call your primary care clinic.  If you do not have a primary care provider, please call [phone] and they will assist you.        Care EveryWhere ID     This is your Care EveryWhere ID. This could be used by other organizations to access your [place] medical records  [id number]         Blood Pressure from Last 3 Encounters:   05/23/17 110/70   03/21/17 110/60   03/15/17 131/74    Weight from Last 3 Encounters:   05/23/17 52.2 kg (115 lb)   03/21/17 54.3 kg (119 lb 11.2 oz)   03/15/17 52.6 kg (116 lb)              We Performed the Following     ACUPUNCTURE, 1+ NEEDLES, W/O ELECTRICAL STIM; INIT 15 MIN PERSONAL CONTACT     CHIROPRAC MANIP,SPINAL,1-2 REGIONS        Primary Care Provider Office Phone # Fax #    Renny Amaya [phone]       [address] [facility] [address]        Equal Access to Services     ROYCE TORO AH: Hadii jose luis westbrooko Sodavid, waaxda luqadaha, qaybta kaalmada adeajyada, felipa neal. So [facility] [phone].    ATENCIÓN: Si habla español, tiene a hernández disposición servicios gratuitos de asistencia lingüística. [facility] [phone].    We comply with applicable federal civil rights laws and [place] laws. We do not discriminate on the basis of race, color, national origin, age, disability sex, sexual orientation or gender identity.            Thank you!     Thank you for choosing [facility]  for your care. Our goal is always to provide you with excellent care. Hearing back from our patients is one way we can continue to improve our services. Please take a few minutes to complete the written survey that you may receive in  the mail after your visit with us. Thank you!             Your Updated Medication List - Protect others around you: Learn how to safely use, store and throw away your medicines at www.disposemymeds.org.          This list is accurate as of: 9/20/17 12:56 PM.  Always use your most recent med list.                   Brand Name Dispense Instructions for use Diagnosis    ADVIL PO      Take 800 mg by mouth Reported on 5/23/2017        citalopram 40 MG tablet    celeXA    90 tablet    Take 1 tablet (40 mg) by mouth daily INDICATION: TO TREAT ANXIETY    Anxiety       cyanocobalamin 1000 MCG/ML injection    VITAMIN B12    30 mL    Inject 1 mL (1,000 mcg) Subcutaneous every 14 days INDICATION: FOR VITAMIN B12 SUPPLEMENTATION    Vitamin B12 deficiency without anemia       ferrous sulfate 140 (45 FE) MG Tbcr CR tablet     90 tablet    TAKE ONE TABLET BY MOUTH EVERY DAY. PLEASE TAKE WITH 4OZ OF ORANGE JUICE WITH PULP    Iron deficiency       hydrOXYzine 25 MG tablet    ATARAX    60 tablet    Take 2-4 tablets ( mg) by mouth nightly as needed for itching    Persistent insomnia, Perennial allergic rhinitis, unspecified allergic rhinitis trigger       ondansetron 8 MG tablet    ZOFRAN    15 tablet    Take 1 tablet (8 mg) by mouth every 8 hours as needed for nausea    Nausea       valACYclovir 1000 mg tablet    VALTREX    4 tablet    Take 2 tablets (2,000 mg) by mouth 2 times daily    Recurrent cold sores       vitamin D3 08197 UNITS capsule    CHOLECALCIFEROL    40 capsule    Take 1 capsule (50,000 Units) by mouth every 30 days NDICATION: TO TREAT VITAMIN D DEFICIENCY    Vitamin D deficiency

## 2017-10-04 ENCOUNTER — THERAPY VISIT (OUTPATIENT)
Dept: CHIROPRACTIC MEDICINE | Facility: CLINIC | Age: 42
End: 2017-10-04
Payer: COMMERCIAL

## 2017-10-04 DIAGNOSIS — M99.02 THORACIC SEGMENT DYSFUNCTION: ICD-10-CM

## 2017-10-04 DIAGNOSIS — G43.119 INTRACTABLE MIGRAINE WITH AURA WITHOUT STATUS MIGRAINOSUS: ICD-10-CM

## 2017-10-04 DIAGNOSIS — M99.00 SEGMENTAL AND SOMATIC DYSFUNCTION OF HEAD REGION: ICD-10-CM

## 2017-10-04 DIAGNOSIS — M99.01 CERVICAL SEGMENT DYSFUNCTION: ICD-10-CM

## 2017-10-04 DIAGNOSIS — G44.201 INTRACTABLE TENSION-TYPE HEADACHE, UNSPECIFIED CHRONICITY PATTERN: ICD-10-CM

## 2017-10-04 DIAGNOSIS — M54.2 CERVICALGIA: Primary | ICD-10-CM

## 2017-10-04 PROCEDURE — 97810 ACUP 1/> WO ESTIM 1ST 15 MIN: CPT | Mod: GA | Performed by: CHIROPRACTOR

## 2017-10-04 PROCEDURE — 98940 CHIROPRACT MANJ 1-2 REGIONS: CPT | Mod: AT | Performed by: CHIROPRACTOR

## 2017-10-04 NOTE — MR AVS SNAPSHOT
After Visit Summary   10/4/2017    Olivia Flores    MRN: [id number]           Patient Information     Date Of Birth          1975        Visit Information        Provider Department      10/4/2017 8:30 AM Ned Dominguez DC [facility] [place] Chi[facility]        Today's Diagnoses     Cervicalgia    -  1    Cervical segment dysfunction        Thoracic segment dysfunction        Intractable tension-type headache, unspecified chronicity pattern        Segmental and somatic dysfunction of head region        Intractable migraine with aura without status migrainosus           Follow-ups after your visit        Your next 10 appointments already scheduled     Oct 18, 2017  8:30 AM CDT   DOUGLAS Chiropractor with Ned Dominguez DC   [facility] [place] [facility] ([facility] [place])    [address] [place] [address]  [address]  [place] [address]   [phone]            Nov 08, 2017  8:30 AM CST   DOUGLAS Chiropractor with Ned Dominguez DC   [facility] [place] [facility] ([facility] [place])    [address] [place] [address]  [address]  [place] [address]   [phone]            Dec 06, 2017  8:30 AM CST   DOUGLAS Chiropractor with Ned Dominguez DC   [facility] [place] [facility] ([facility] [place])    [address] [place] [address]  [address]  [place] [address]   [phone]              Who to contact     If you have questions or need follow up information about today's clinic visit or your schedule please contact [facility] [place] [facility] directly at [phone].  Normal or non-critical lab and imaging results will be communicated to you by MyChart, letter or phone within 4 business days after the clinic has received the results. If you do not hear from us within 7 days, please contact the clinic through MyChart or phone. If you have a critical or abnormal lab  result, we will notify you by phone as soon as possible.  Submit refill requests through [employer] or call your pharmacy and they will forward the refill request to us. Please allow 3 business days for your refill to be completed.          Additional Information About Your Visit        [employer] Information     [employer] gives you secure access to your electronic health record. If you see a primary care provider, you can also send messages to your care team and make appointments. If you have questions, please call your primary care clinic.  If you do not have a primary care provider, please call [phone] and they will assist you.        Care EveryWhere ID     This is your Care EveryWhere ID. This could be used by other organizations to access your [place] medical records  [id number]         Blood Pressure from Last 3 Encounters:   05/23/17 110/70   03/21/17 110/60   03/15/17 131/74    Weight from Last 3 Encounters:   05/23/17 52.2 kg (115 lb)   03/21/17 54.3 kg (119 lb 11.2 oz)   03/15/17 52.6 kg (116 lb)              We Performed the Following     ACUPUNCTURE, 1+ NEEDLES, W/O ELECTRICAL STIM; INIT 15 MIN PERSONAL CONTACT     CHIROPRAC MANIP,SPINAL,1-2 REGIONS        Primary Care Provider Office Phone # Fax #    Renny Amaya [phone]       [address] [facility] [address]        Equal Access to Services     ROYCE TORO : Hadii jose luis ku Sodavid, waaxda luqadaha, qaybta kaalmamarito inman, felipa springer . So [facility] [phone].    ATENCIÓN: Si habla español, tiene a hernández disposición servicios gratuitos de asistencia lingüística. Ami al [phone].    We comply with applicable federal civil rights laws and [place] laws. We do not discriminate on the basis of race, color, national origin, age, disability, sex, sexual orientation, or gender identity.            Thank you!     Thank you for choosing [facility]RA[facility]  for your care. Our goal is always to provide you with excellent care. Hearing back from our patients is one way we can continue to improve our services. Please take a few minutes to complete the written survey that you may receive in the mail after your visit with us. Thank you!             Your Updated Medication List - Protect others around you: Learn how to safely use, store and throw away your medicines at www.disposemymeds.org.          This list is accurate as of: 10/4/17  9:11 PM.  Always use your most recent med list.                   Brand Name Dispense Instructions for use Diagnosis    ADVIL PO      Take 800 mg by mouth Reported on 5/23/2017        citalopram 40 MG tablet    celeXA    90 tablet    Take 1 tablet (40 mg) by mouth daily INDICATION: TO TREAT ANXIETY    Anxiety       cyanocobalamin 1000 MCG/ML injection    VITAMIN B12    30 mL    Inject 1 mL (1,000 mcg) Subcutaneous every 14 days INDICATION: FOR VITAMIN B12 SUPPLEMENTATION    Vitamin B12 deficiency without anemia       ferrous sulfate 140 (45 FE) MG Tbcr CR tablet     90 tablet    TAKE ONE TABLET BY MOUTH EVERY DAY. PLEASE TAKE WITH 4OZ OF ORANGE JUICE WITH PULP    Iron deficiency       hydrOXYzine 25 MG tablet    ATARAX    60 tablet    Take 2-4 tablets ( mg) by mouth nightly as needed for itching    Persistent insomnia, Perennial allergic rhinitis, unspecified allergic rhinitis trigger       ondansetron 8 MG tablet    ZOFRAN    15 tablet    Take 1 tablet (8 mg) by mouth every 8 hours as needed for nausea    Nausea       valACYclovir 1000 mg tablet    VALTREX    4 tablet    Take 2 tablets (2,000 mg) by mouth 2 times daily    Recurrent cold sores       vitamin D3 57820 UNITS capsule    CHOLECALCIFEROL    40 capsule    Take 1 capsule (50,000 Units) by mouth every 30 days NDICATION: TO TREAT VITAMIN D DEFICIENCY    Vitamin D deficiency

## 2017-10-05 NOTE — PROGRESS NOTES
Visit #:  11/2017      Subjective:  Olivia Flores is a 40 year old female who is seen in f/u up for:        Cervicalgia  Cervical segment dysfunction  Thoracic segment dysfunction  Intractable tension-type headache, unspecified chronicity pattern  Segmental and somatic dysfunction of head region  Intractable migraine with aura without status migrainosus.     Since last visit,   Olivia Flores reports:    Area of chief complaint:  Cervical and Thoracic :  Symptoms are graded at 4-5/10. The quality is described as stiff, dull.  Motion has increased, and patient is returning to baseline sx. The pt reports at least 80-85% subjective improvement since initial presentation. She has almost returned to baseline. She notes only one HA since the previous treatment. Stress levels have decreased. She notes B tension at the base of the head. The pt denies weakness or other unusual sx.    Since last visit the patient feels that they are worse    Objective:  The following was observed:    P: pain elicited on palpation: OCC R, C2, C3 right, T5, T6,   A: static palpation demonstrates intersegmental asymmetry OCC, R, C2, C3 right, T5, T6,    R: motion palpation notes restricted motion  T: hypertonicity at: Sub-occipital and T-spine paraspinal Bilaterally    Segmental spinal dysfunction/restrictions found at:  OCC R, C2, C3 right, T5, T6      Assessment:    Diagnoses:      1. Cervicalgia    2. Cervical segment dysfunction    3. Thoracic segment dysfunction    4. Intractable tension-type headache, unspecified chronicity pattern    5. Segmental and somatic dysfunction of head region    6. Intractable migraine with aura without status migrainosus        Patient's condition:  Pt returning to baseline and responding well to therapy    Treatment effectiveness:  No increase in sx pt stable       Procedures:  CMT:  46260 Chiropractic manipulative treatment 1-2  regions performed     Thoracic: T1/T2, T7, T8 prone diversified and  activator  Occiput: L OCC prone activator     Modalities:  43773: Acupuncture:  Points: Lulu Points in cervical spine jaycob points of the cervical and thoracic musculature 20 minutes prone    Therapeutic procedures:  None    Response to Treatment  Reduction of symptoms no increase in sx, pt stable    Prognosis: Guarded    Progress towards Goals: Patient is making progress towards the goal.The pt would like to run or bike without pain in the neck and upper back-the pt has reached this goal  Duration to achieve goal will be 4-8 weeks at a frequency of 1 per week       Recommendations:    Instructions:drink plenty of fluids    Follow-up:  Return to care in  weeks for stability with reduction of care

## 2017-10-18 ENCOUNTER — THERAPY VISIT (OUTPATIENT)
Dept: CHIROPRACTIC MEDICINE | Facility: CLINIC | Age: 42
End: 2017-10-18
Payer: COMMERCIAL

## 2017-10-18 DIAGNOSIS — G43.119 INTRACTABLE MIGRAINE WITH AURA WITHOUT STATUS MIGRAINOSUS: ICD-10-CM

## 2017-10-18 DIAGNOSIS — M99.01 CERVICAL SEGMENT DYSFUNCTION: ICD-10-CM

## 2017-10-18 DIAGNOSIS — G44.201 INTRACTABLE TENSION-TYPE HEADACHE, UNSPECIFIED CHRONICITY PATTERN: ICD-10-CM

## 2017-10-18 DIAGNOSIS — M99.00 SEGMENTAL AND SOMATIC DYSFUNCTION OF HEAD REGION: ICD-10-CM

## 2017-10-18 DIAGNOSIS — M99.02 THORACIC SEGMENT DYSFUNCTION: ICD-10-CM

## 2017-10-18 DIAGNOSIS — M54.2 CERVICALGIA: Primary | ICD-10-CM

## 2017-10-18 PROCEDURE — 97810 ACUP 1/> WO ESTIM 1ST 15 MIN: CPT | Mod: GA | Performed by: CHIROPRACTOR

## 2017-10-18 PROCEDURE — 98940 CHIROPRACT MANJ 1-2 REGIONS: CPT | Mod: AT | Performed by: CHIROPRACTOR

## 2017-10-18 NOTE — PROGRESS NOTES
Visit #:  12/2017      Subjective:  Olivia Flores is a 40 year old female who is seen in f/u up for:        Cervicalgia  Cervical segment dysfunction  Thoracic segment dysfunction  Intractable tension-type headache, unspecified chronicity pattern  Segmental and somatic dysfunction of head region  Intractable migraine with aura without status migrainosus.     Since last visit,   Olivia Flores reports:    Area of chief complaint:  Cervical and Thoracic :  Symptoms are graded at 1-2/10. The quality is described as stiff, dull.  Motion has increased, and patient is returning to baseline sx. Since last treatment, the pt reports one HA that she developed over the weekend however no migraine. She notes tension on the R side of the neck after running over the weekend. The denies other unusual sx. She is pleased with her progress.        Since last visit the patient feels that they are at least 75% better from the previous treatment.     Objective:  The following was observed:    P: pain elicited on palpation: OCC R, C2, C3 right, T5, T6,   A: static palpation demonstrates intersegmental asymmetry OCC, R, C2, C3 right, T5, T6,    R: motion palpation notes restricted motion  T: hypertonicity at: Sub-occipital and T-spine paraspinal Bilaterally    Segmental spinal dysfunction/restrictions found at:  OCC R, C2, C3 right, T5, T6      Assessment:    Diagnoses:      1. Cervicalgia    2. Cervical segment dysfunction    3. Thoracic segment dysfunction    4. Intractable tension-type headache, unspecified chronicity pattern    5. Segmental and somatic dysfunction of head region    6. Intractable migraine with aura without status migrainosus        Patient's condition:  Pt returning to baseline and responding well to therapy    Treatment effectiveness:  No increase in sx pt stable       Procedures:  CMT:  59128 Chiropractic manipulative treatment 1-2  regions performed   Cervical: C2, C3 R   Thoracic: T1/T2, T7, T8 prone  diversified and activator      Modalities:  86147: Acupuncture:  Points: Lulu Points in cervical spine jaycob points of the cervical and thoracic musculature 20 minutes prone    Therapeutic procedures:  None    Response to Treatment  Reduction of symptoms no increase in sx, pt stable    Prognosis: Guarded    Progress towards Goals: Patient is making progress towards the goal.The pt would like to run or bike without pain in the neck and upper back-the pt has reached this goal  Duration to achieve goal will be 4-8 weeks at a frequency of 1 per week       Recommendations:    Instructions:drink plenty of fluids    Follow-up:  Return to care in 4-6 weeks or if sx persist

## 2017-10-18 NOTE — MR AVS SNAPSHOT
After Visit Summary   10/18/2017    Olivia Flores    MRN: [id number]           Patient Information     Date Of Birth          1975        Visit Information        Provider Department      10/18/2017 8:30 AM Ned Dominguez DC [facility] [place] [facility]        Today's Diagnoses     Cervicalgia    -  1    Cervical segment dysfunction        Thoracic segment dysfunction        Intractable tension-type headache, unspecified chronicity pattern        Segmental and somatic dysfunction of head region        Intractable migraine with aura without status migrainosus           Follow-ups after your visit        Your next 10 appointments already scheduled     Nov 08, 2017  8:30 AM CST   DOUGLAS Chiropractor with Ned Dominguez DC   [facility] [place] [facility] ([facility] [place])    [address] [place] [address]  [address]  [place] [address]   [phone]            Dec 06, 2017  8:30 AM CST   DOUGLAS Chiropractor with Ned Dominguez DC   [facility] [place] [facility] ([facility] [place])    [address] [place] [address]  [address]  [place] [address]   [phone]              Who to contact     If you have questions or need follow up information about today's clinic visit or your schedule please contact [facility] [place] [facility] directly at [phone].  Normal or non-critical lab and imaging results will be communicated to you by MyChart, letter or phone within 4 business days after the clinic has received the results. If you do not hear from us within 7 days, please contact the clinic through MyChart or phone. If you have a critical or abnormal lab result, we will notify you by phone as soon as possible.  Submit refill requests through [employer] or call your pharmacy and they will forward the refill request to us. Please allow 3 business days for your refill to be completed.           Additional Information About Your Visit        MyChart Information     [employer] gives you secure access to your electronic health record. If you see a primary care provider, you can also send messages to your care team and make appointments. If you have questions, please call your primary care clinic.  If you do not have a primary care provider, please call [phone] and they will assist you.        Care EveryWhere ID     This is your Care EveryWhere ID. This could be used by other organizations to access your [facility] medical records  [id number]         Blood Pressure from Last 3 Encounters:   05/23/17 110/70   03/21/17 110/60   03/15/17 131/74    Weight from Last 3 Encounters:   05/23/17 52.2 kg (115 lb)   03/21/17 54.3 kg (119 lb 11.2 oz)   03/15/17 52.6 kg (116 lb)              We Performed the Following     ACUPUNCTURE, 1+ NEEDLES, W/O ELECTRICAL STIM; INIT 15 MIN PERSONAL CONTACT     CHIROPRAC MANIP,SPINAL,1-2 REGIONS        Primary Care Provider Office Phone # Fax #    Renny Amaya [phone]       [address] [facility] [address]        Equal Access to Services     ROYCE TORO AH: Hadii jose luis westbrooko Sodavid, waaxda luqadaha, qaybta kaalmada adeajyada, felipa neal. So [facility] [phone].    ATENCIÓN: Si habla español, tiene a hernández disposición servicios gratuitos de asistencia lingüística. [facility] [phone].    We comply with applicable federal civil rights laws and [place] laws. We do not discriminate on the basis of race, color, national origin, age, disability, sex, sexual orientation, or gender identity.            Thank you!     Thank you for choosing [facility]  for your care. Our goal is always to provide you with excellent care. Hearing back from our patients is one way we can continue to improve our services. Please take a few minutes to complete the written survey that you may receive  in the mail after your visit with us. Thank you!             Your Updated Medication List - Protect others around you: Learn how to safely use, store and throw away your medicines at www.disposemymeds.org.          This list is accurate as of: 10/18/17  9:22 AM.  Always use your most recent med list.                   Brand Name Dispense Instructions for use Diagnosis    ADVIL PO      Take 800 mg by mouth Reported on 5/23/2017        citalopram 40 MG tablet    celeXA    90 tablet    Take 1 tablet (40 mg) by mouth daily INDICATION: TO TREAT ANXIETY    Anxiety       cyanocobalamin 1000 MCG/ML injection    VITAMIN B12    30 mL    Inject 1 mL (1,000 mcg) Subcutaneous every 14 days INDICATION: FOR VITAMIN B12 SUPPLEMENTATION    Vitamin B12 deficiency without anemia       ferrous sulfate 140 (45 FE) MG Tbcr CR tablet     90 tablet    TAKE ONE TABLET BY MOUTH EVERY DAY. PLEASE TAKE WITH 4OZ OF ORANGE JUICE WITH PULP    Iron deficiency       hydrOXYzine 25 MG tablet    ATARAX    60 tablet    Take 2-4 tablets ( mg) by mouth nightly as needed for itching    Persistent insomnia, Perennial allergic rhinitis, unspecified allergic rhinitis trigger       ondansetron 8 MG tablet    ZOFRAN    15 tablet    Take 1 tablet (8 mg) by mouth every 8 hours as needed for nausea    Nausea       valACYclovir 1000 mg tablet    VALTREX    4 tablet    Take 2 tablets (2,000 mg) by mouth 2 times daily    Recurrent cold sores       vitamin D3 92747 UNITS capsule    CHOLECALCIFEROL    40 capsule    Take 1 capsule (50,000 Units) by mouth every 30 days NDICATION: TO TREAT VITAMIN D DEFICIENCY    Vitamin D deficiency

## 2017-11-08 ENCOUNTER — THERAPY VISIT (OUTPATIENT)
Dept: CHIROPRACTIC MEDICINE | Facility: CLINIC | Age: 42
End: 2017-11-08
Payer: COMMERCIAL

## 2017-11-08 DIAGNOSIS — G44.201 INTRACTABLE TENSION-TYPE HEADACHE, UNSPECIFIED CHRONICITY PATTERN: ICD-10-CM

## 2017-11-08 DIAGNOSIS — M54.2 CERVICALGIA: Primary | ICD-10-CM

## 2017-11-08 DIAGNOSIS — G43.119 INTRACTABLE MIGRAINE WITH AURA WITHOUT STATUS MIGRAINOSUS: ICD-10-CM

## 2017-11-08 DIAGNOSIS — M99.02 THORACIC SEGMENT DYSFUNCTION: ICD-10-CM

## 2017-11-08 DIAGNOSIS — M99.01 CERVICAL SEGMENT DYSFUNCTION: ICD-10-CM

## 2017-11-08 DIAGNOSIS — M99.00 SEGMENTAL AND SOMATIC DYSFUNCTION OF HEAD REGION: ICD-10-CM

## 2017-11-08 PROCEDURE — 98940 CHIROPRACT MANJ 1-2 REGIONS: CPT | Mod: AT | Performed by: CHIROPRACTOR

## 2017-11-08 PROCEDURE — 97810 ACUP 1/> WO ESTIM 1ST 15 MIN: CPT | Mod: GA | Performed by: CHIROPRACTOR

## 2017-11-08 NOTE — MR AVS SNAPSHOT
After Visit Summary   11/8/2017    Olivia Flores    MRN: [id number]           Patient Information     Date Of Birth          1975        Visit Information        Provider Department      11/8/2017 8:30 AM Ned Dominguez DC [facility] [place] [facility]        Today's Diagnoses     Cervicalgia    -  1    Cervical segment dysfunction        Thoracic segment dysfunction        Intractable tension-type headache, unspecified chronicity pattern        Segmental and somatic dysfunction of head region        Intractable migraine with aura without status migrainosus           Follow-ups after your visit        Your next 10 appointments already scheduled     Nov 22, 2017  8:30 AM CST   DOUGLAS Chiropractor with Ned Dominguez DC   [facility] [place] [facility] ([facility] [place])    [address] [place] [address]  [address]  [place] [address]   [phone]            Dec 06, 2017  8:30 AM CST   DOUGLAS Chiropractor with Ned Dominguez DC   [facility] [place] [facility] ([facility] [place])    [address] [place] [address]  [address]  [place] [address]   [phone]            Dec 20, 2017  8:30 AM CST   DOUGLAS Chiropractor with Ned Dominguez DC   [facility] [place] [facility] ([facility] [place])    [address] [place] [address]  [address]  [place] [address]   [phone]              Who to contact     If you have questions or need follow up information about today's clinic visit or your schedule please contact [facility] [place] [facility] directly at [phone].  Normal or non-critical lab and imaging results will be communicated to you by MyChart, letter or phone within 4 business days after the clinic has received the results. If you do not hear from us within 7 days, please contact the clinic through MyChart or phone. If you have a critical or abnormal lab  result, we will notify you by phone as soon as possible.  Submit refill requests through [employer] or call your pharmacy and they will forward the refill request to us. Please allow 3 business days for your refill to be completed.          Additional Information About Your Visit        [employer] Information     [employer] gives you secure access to your electronic health record. If you see a primary care provider, you can also send messages to your care team and make appointments. If you have questions, please call your primary care clinic.  If you do not have a primary care provider, please call [phone] and they will assist you.        Care EveryWhere ID     This is your Care EveryWhere ID. This could be used by other organizations to access your [place] medical records  [id number]         Blood Pressure from Last 3 Encounters:   05/23/17 110/70   03/21/17 110/60   03/15/17 131/74    Weight from Last 3 Encounters:   05/23/17 52.2 kg (115 lb)   03/21/17 54.3 kg (119 lb 11.2 oz)   03/15/17 52.6 kg (116 lb)              We Performed the Following     ACUPUNCTURE, 1+ NEEDLES, W/O ELECTRICAL STIM; INIT 15 MIN PERSONAL CONTACT     CHIROPRAC MANIP,SPINAL,1-2 REGIONS        Primary Care Provider Office Phone # Fax #    Renny Amaya [phone]       [address] [facility] [address]        Equal Access to Services     ROYCE TORO : Hadii jose luis ku Sodavid, waaxda luqadaha, qaybta kaalmamarito inman, felipa springer . So [facility] [phone].    ATENCIÓN: Si habla español, tiene a hernández disposición servicios gratuitos de asistencia lingüística. Ami al [phone].    We comply with applicable federal civil rights laws and [place] laws. We do not discriminate on the basis of race, color, national origin, age, disability, sex, sexual orientation, or gender identity.            Thank you!     Thank you for choosing [facility]  for your care. Our goal is always to provide you with excellent care. Hearing back from our patients is one way we can continue to improve our services. Please take a few minutes to complete the written survey that you may receive in the mail after your visit with us. Thank you!             Your Updated Medication List - Protect others around you: Learn how to safely use, store and throw away your medicines at www.disposemymeds.org.          This list is accurate as of: 11/8/17  9:09 AM.  Always use your most recent med list.                   Brand Name Dispense Instructions for use Diagnosis    ADVIL PO      Take 800 mg by mouth Reported on 5/23/2017        citalopram 40 MG tablet    celeXA    90 tablet    Take 1 tablet (40 mg) by mouth daily INDICATION: TO TREAT ANXIETY    Anxiety       cyanocobalamin 1000 MCG/ML injection    VITAMIN B12    30 mL    Inject 1 mL (1,000 mcg) Subcutaneous every 14 days INDICATION: FOR VITAMIN B12 SUPPLEMENTATION    Vitamin B12 deficiency without anemia       ferrous sulfate 140 (45 FE) MG Tbcr CR tablet     90 tablet    TAKE ONE TABLET BY MOUTH EVERY DAY. PLEASE TAKE WITH 4OZ OF ORANGE JUICE WITH PULP    Iron deficiency       hydrOXYzine 25 MG tablet    ATARAX    60 tablet    Take 2-4 tablets ( mg) by mouth nightly as needed for itching    Persistent insomnia, Perennial allergic rhinitis, unspecified allergic rhinitis trigger       ondansetron 8 MG tablet    ZOFRAN    15 tablet    Take 1 tablet (8 mg) by mouth every 8 hours as needed for nausea    Nausea       valACYclovir 1000 mg tablet    VALTREX    4 tablet    Take 2 tablets (2,000 mg) by mouth 2 times daily    Recurrent cold sores       vitamin D3 31220 UNITS capsule    CHOLECALCIFEROL    40 capsule    Take 1 capsule (50,000 Units) by mouth every 30 days NDICATION: TO TREAT VITAMIN D DEFICIENCY    Vitamin D deficiency

## 2017-11-08 NOTE — PROGRESS NOTES
Visit #:  13/2017      Subjective:  Olivia Flores is a 40 year old female who is seen in f/u up for:        Cervicalgia  Cervical segment dysfunction  Thoracic segment dysfunction  Intractable tension-type headache, unspecified chronicity pattern  Segmental and somatic dysfunction of head region  Intractable migraine with aura without status migrainosus.     Since last visit,   Olivia Flores reports:    Area of chief complaint:  Cervical and Thoracic :  Symptoms are graded at 9/10. The quality is described as stiff, dull.  The pt reports moderate stress one week ago followed by a migraine. She has not had a severe migraine for quite some time. In the past 3 weeks she denies other HA sx. Today the pain is located on the R side of the neck area. She denies neck pain or HA sx today and since the migraine episode. She denies weakness or other unusual sx.     Since last visit the patient feels that they are at least 75% better from the previous treatment-exacerbation  Objective:  The following was observed:    P: pain elicited on palpation: OCC R, C2, C3 right, T5, T6,   A: static palpation demonstrates intersegmental asymmetry OCC, R, C2, C3 right, T5, T6,    R: motion palpation notes restricted motion  T: hypertonicity at: Sub-occipital and T-spine paraspinal Bilaterally    Segmental spinal dysfunction/restrictions found at:  OCC R, C2, C3 right, T5, T6      Assessment:    Diagnoses:      1. Cervicalgia    2. Cervical segment dysfunction    3. Thoracic segment dysfunction    4. Intractable tension-type headache, unspecified chronicity pattern    5. Segmental and somatic dysfunction of head region    6. Intractable migraine with aura without status migrainosus        Patient's condition:  Exacerbation due to stress     Treatment effectiveness:  No increase in sx pt stable       Procedures:  CMT:  28255 Chiropractic manipulative treatment 1-2  regions performed   Cervical: C2, C3 R   Thoracic: T1/T2, T7, T8 prone  diversified and activator      Modalities:  42070: Acupuncture:  Points: Lulu Points in cervical spine jaycob points of the cervical and thoracic musculature 20 minutes prone    Therapeutic procedures:  1st rib stretching R     Response to Treatment  Reduction of symptoms no increase in sx, pt stable    Prognosis: Guarded    Progress towards Goals: Patient is making progress towards the goal.The pt would like to run or bike without pain in the neck and upper back-the pt has reached this goal  Duration to achieve goal will be 4-8 weeks at a frequency of 1 per week       Recommendations:    Instructions:drink plenty of fluids    Follow-up:  Return to care in 2 weeks due to exacerbation

## 2017-11-22 ENCOUNTER — THERAPY VISIT (OUTPATIENT)
Dept: CHIROPRACTIC MEDICINE | Facility: CLINIC | Age: 42
End: 2017-11-22
Payer: COMMERCIAL

## 2017-11-22 DIAGNOSIS — M99.00 SEGMENTAL AND SOMATIC DYSFUNCTION OF HEAD REGION: ICD-10-CM

## 2017-11-22 DIAGNOSIS — M54.2 CERVICALGIA: Primary | ICD-10-CM

## 2017-11-22 DIAGNOSIS — G44.201 INTRACTABLE TENSION-TYPE HEADACHE, UNSPECIFIED CHRONICITY PATTERN: ICD-10-CM

## 2017-11-22 DIAGNOSIS — G43.119 INTRACTABLE MIGRAINE WITH AURA WITHOUT STATUS MIGRAINOSUS: ICD-10-CM

## 2017-11-22 DIAGNOSIS — M99.01 CERVICAL SEGMENT DYSFUNCTION: ICD-10-CM

## 2017-11-22 DIAGNOSIS — M99.02 THORACIC SEGMENT DYSFUNCTION: ICD-10-CM

## 2017-11-22 PROCEDURE — 98940 CHIROPRACT MANJ 1-2 REGIONS: CPT | Mod: AT | Performed by: CHIROPRACTOR

## 2017-11-22 PROCEDURE — 97810 ACUP 1/> WO ESTIM 1ST 15 MIN: CPT | Mod: GA | Performed by: CHIROPRACTOR

## 2017-11-22 NOTE — MR AVS SNAPSHOT
After Visit Summary   11/22/2017    Olivia Flores    MRN: [id number]           Patient Information     Date Of Birth          1975        Visit Information        Provider Department      11/22/2017 8:30 AM Ned Dominguez DC [facility] - [place] Chiropractor        Today's Diagnoses     Cervicalgia    -  1    Cervical segment dysfunction        Thoracic segment dysfunction        Intractable tension-type headache, unspecified chronicity pattern        Segmental and somatic dysfunction of head region        Intractable migraine with aura without status migrainosus           Follow-ups after your visit        Your next 10 appointments already scheduled     Dec 06, 2017  8:30 AM CST   DOUGLAS Chiropractor with Ned Dominguez DC   [facility] [place] Chirop[facility] ([facility] [place])    [address] [place] [address]  [address]  [place] [address]   [phone]            Dec 20, 2017  8:30 AM CST   DOUGLAS Chiropractor with Ned Dominguez DC   [facility] [place] Chiropractor ([facility] [place])    [address] [place] [address]  [address]  [place] [address]   [phone]              Who to contact     If you have questions or need follow up information about today's clinic visit or your schedule please contact [facility] [place] [facility] directly at [phone].  Normal or non-critical lab and imaging results will be communicated to you by MyChart, letter or phone within 4 business days after the clinic has received the results. If you do not hear from us within 7 days, please contact the clinic through MyChart or phone. If you have a critical or abnormal lab result, we will notify you by phone as soon as possible.  Submit refill requests through arGEN-X or call your pharmacy and they will forward the refill request to us. Please allow 3 business days for your refill to be completed.           Additional Information About Your Visit        Digital Loyalty Systemhart Information     KIHEITAI gives you secure access to your electronic health record. If you see a primary care provider, you can also send messages to your care team and make appointments. If you have questions, please call your primary care clinic.  If you do not have a primary care provider, please call [phone] and they will assist you.        Care EveryWhere ID     This is your Care EveryWhere ID. This could be used by other organizations to access your [place] medical records  [id number]         Blood Pressure from Last 3 Encounters:   05/23/17 110/70   03/21/17 110/60   03/15/17 131/74    Weight from Last 3 Encounters:   05/23/17 52.2 kg (115 lb)   03/21/17 54.3 kg (119 lb 11.2 oz)   03/15/17 52.6 kg (116 lb)              We Performed the Following     ACUPUNCTURE, 1+ NEEDLES, W/O ELECTRICAL STIM; INIT 15 MIN PERSONAL CONTACT     CHIROPRAC MANIP,SPINAL,1-2 REGIONS        Primary Care Provider Office Phone # Fax #    Renny Amaya [phone]       XXX RESIGNED XXX  [facility] [address]        Equal Access to Services     ROYCE TORO AH: Hadii jose luis westbrooko Sodavid, waaxda luqadaha, qaybta kaalmada adeajyada, felipa neal. So [facility] [phone].    ATENCIÓN: Si habla español, tiene a hernández disposición servicios gratuitos de asistencia lingüística. [facility] [phone].    We comply with applicable federal civil rights laws and [place] laws. We do not discriminate on the basis of race, color, national origin, age, disability, sex, sexual orientation, or gender identity.            Thank you!     Thank you for choosing [facility]  for your care. Our goal is always to provide you with excellent care. Hearing back from our patients is one way we can continue to improve our services. Please take a few minutes to complete the written survey that you may  receive in the mail after your visit with us. Thank you!             Your Updated Medication List - Protect others around you: Learn how to safely use, store and throw away your medicines at www.disposemymeds.org.          This list is accurate as of: 11/22/17  9:34 AM.  Always use your most recent med list.                   Brand Name Dispense Instructions for use Diagnosis    ADVIL PO      Take 800 mg by mouth Reported on 5/23/2017        citalopram 40 MG tablet    celeXA    90 tablet    Take 1 tablet (40 mg) by mouth daily INDICATION: TO TREAT ANXIETY    Anxiety       cyanocobalamin 1000 MCG/ML injection    VITAMIN B12    30 mL    Inject 1 mL (1,000 mcg) Subcutaneous every 14 days INDICATION: FOR VITAMIN B12 SUPPLEMENTATION    Vitamin B12 deficiency without anemia       ferrous sulfate 140 (45 FE) MG Tbcr CR tablet     90 tablet    TAKE ONE TABLET BY MOUTH EVERY DAY. PLEASE TAKE WITH 4OZ OF ORANGE JUICE WITH PULP    Iron deficiency       hydrOXYzine 25 MG tablet    ATARAX    60 tablet    Take 2-4 tablets ( mg) by mouth nightly as needed for itching    Persistent insomnia, Perennial allergic rhinitis, unspecified allergic rhinitis trigger       ondansetron 8 MG tablet    ZOFRAN    15 tablet    Take 1 tablet (8 mg) by mouth every 8 hours as needed for nausea    Nausea       valACYclovir 1000 mg tablet    VALTREX    4 tablet    Take 2 tablets (2,000 mg) by mouth 2 times daily    Recurrent cold sores       vitamin D3 14618 UNITS capsule    CHOLECALCIFEROL    40 capsule    Take 1 capsule (50,000 Units) by mouth every 30 days NDICATION: TO TREAT VITAMIN D DEFICIENCY    Vitamin D deficiency

## 2017-11-22 NOTE — PROGRESS NOTES
Visit #:  14/2017      Subjective:  Olivia Flores is a 40 year old female who is seen in f/u up for:        Cervicalgia  Cervical segment dysfunction  Thoracic segment dysfunction  Intractable tension-type headache, unspecified chronicity pattern  Segmental and somatic dysfunction of head region  Intractable migraine with aura without status migrainosus.     Since last visit,   Olivia Flores reports:    Area of chief complaint:  Cervical and Thoracic :  Symptoms are graded at 6/10. The quality is described as stiff, dull.  The pt reports a migraine every day for almost one week since the previous treatment. She cannot relate a specific incident that could have started the pain other than the adjustment. The px seems like it is on the right side. She denies weakness or other unusual sx.      Since last visit the patient feels that they are worse.     Objective:  The following was observed:    P: pain elicited on palpation: OCC R, C2, C3 right, T5, T6,   A: static palpation demonstrates intersegmental asymmetry OCC, R, C2, C3 right, T5, T6,    R: motion palpation notes restricted motion  T: hypertonicity at: Sub-occipital and T-spine paraspinal Bilaterally    Segmental spinal dysfunction/restrictions found at:  OCC R, C2, C3 right, T5, T6      Assessment:    Diagnoses:      1. Cervicalgia    2. Cervical segment dysfunction    3. Thoracic segment dysfunction    4. Intractable tension-type headache, unspecified chronicity pattern    5. Segmental and somatic dysfunction of head region    6. Intractable migraine with aura without status migrainosus        Patient's condition:  Exacerbation     Treatment effectiveness:  No increase in sx pt stable       Procedures:  CMT:  61006 Chiropractic manipulative treatment 1-2  regions performed   Cervical:  Activator C2, C3 R   Thoracic: T1/T2, T7, T8 prone diversified and activator      Modalities:  87462: Acupuncture:  Points: Lulu Points in cervical spine jaycob  points of the cervical and thoracic musculature 20 minutes prone    Therapeutic procedures:  1st rib stretching R and Left 3 minutes     Response to Treatment  Reduction of symptoms no increase in sx, pt stable    Prognosis: Guarded    Progress towards Goals: Patient is making progress towards the goal.The pt would like to run or bike without pain in the neck and upper back-the pt has reached this goal  Duration to achieve goal will be 4-8 weeks at a frequency of 1 per week       Recommendations:    Instructions:drink plenty of fluids    Follow-up:  Return to care in 2 weeks due to exacerbation

## 2017-12-06 ENCOUNTER — THERAPY VISIT (OUTPATIENT)
Dept: CHIROPRACTIC MEDICINE | Facility: CLINIC | Age: 42
End: 2017-12-06
Payer: COMMERCIAL

## 2017-12-06 DIAGNOSIS — M54.2 CERVICALGIA: Primary | ICD-10-CM

## 2017-12-06 DIAGNOSIS — M99.00 SEGMENTAL AND SOMATIC DYSFUNCTION OF HEAD REGION: ICD-10-CM

## 2017-12-06 DIAGNOSIS — G43.119 INTRACTABLE MIGRAINE WITH AURA WITHOUT STATUS MIGRAINOSUS: ICD-10-CM

## 2017-12-06 DIAGNOSIS — G44.201 INTRACTABLE TENSION-TYPE HEADACHE, UNSPECIFIED CHRONICITY PATTERN: ICD-10-CM

## 2017-12-06 DIAGNOSIS — M99.02 THORACIC SEGMENT DYSFUNCTION: ICD-10-CM

## 2017-12-06 DIAGNOSIS — M99.01 CERVICAL SEGMENT DYSFUNCTION: ICD-10-CM

## 2017-12-06 PROCEDURE — 97810 ACUP 1/> WO ESTIM 1ST 15 MIN: CPT | Mod: GA | Performed by: CHIROPRACTOR

## 2017-12-06 PROCEDURE — 98940 CHIROPRACT MANJ 1-2 REGIONS: CPT | Mod: AT | Performed by: CHIROPRACTOR

## 2017-12-06 NOTE — PROGRESS NOTES
Visit #:  15/2017      Subjective:  Olivia Flores is a 40 year old female who is seen in f/u up for:        Cervicalgia  Cervical segment dysfunction  Thoracic segment dysfunction  Intractable tension-type headache, unspecified chronicity pattern  Segmental and somatic dysfunction of head region  Intractable migraine with aura without status migrainosus.     Since last visit,   Olivia Flores reports:    Area of chief complaint:  Cervical and Thoracic :  Symptoms are graded at 0/10. Today. The pt experienced small HA sx 2-3/10 however she did not need to take medication. The quality is described as stiff, dull.  The pt reports R sided neck stiffness today and significantly decreased HA sx. She is at least 80% improved overall. She denies weakness or other unusual sx.      Since last visit the patient feels that they are 80% improved  Objective:  The following was observed:    P: pain elicited on palpation: OCC R, C2, C3 right, T5, T6,   A: static palpation demonstrates intersegmental asymmetry OCC, R, C2, C3 right, T5, T6,    R: motion palpation notes restricted motion  T: hypertonicity at: Sub-occipital and T-spine paraspinal Bilaterally    Segmental spinal dysfunction/restrictions found at:  OCC R, C2, C3 right, T5, T6      Assessment:    Diagnoses:      1. Cervicalgia    2. Cervical segment dysfunction    3. Thoracic segment dysfunction    4. Intractable tension-type headache, unspecified chronicity pattern    5. Segmental and somatic dysfunction of head region    6. Intractable migraine with aura without status migrainosus        Patient's condition:  Pt responding well to therapy    Treatment effectiveness:  No increase in sx pt stable       Procedures:  CMT:   55177 Chiropractic manipulative treatment 1-2  regions performed   Cervical:  Activator C2, C3 R   Thoracic: T1/T2, T7, T8 prone diversified and activator      Modalities:  49965: Acupuncture:  Points: Lulu Points in cervical spine jaycob  points of the cervical and thoracic musculature 20 minutes prone    Therapeutic procedures:  1st rib stretching R and Left 3 minutes     Response to Treatment  Reduction of symptoms no increase in sx, pt stable    Prognosis: Guarded    Progress towards Goals: Patient is making progress towards the goal.The pt would like to run or bike without pain in the neck and upper back-the pt has reached this goal  Duration to achieve goal will be 4-8 weeks at a frequency of 1 per week       Recommendations:    Instructions:drink plenty of fluids    Follow-up:  Return to care in 2-3 weeks

## 2017-12-06 NOTE — MR AVS SNAPSHOT
After Visit Summary   12/6/2017    Olivia Flores    MRN: [id number]           Patient Information     Date Of Birth          1975        Visit Information        Provider Department      12/6/2017 8:30 AM Ned Dominguez DC [facility] [place] Chiropractor        Today's Diagnoses     Cervicalgia    -  1    Cervical segment dysfunction        Thoracic segment dysfunction        Intractable tension-type headache, unspecified chronicity pattern        Segmental and somatic dysfunction of head region        Intractable migraine with aura without status migrainosus           Follow-ups after your visit        Your next 10 appointments already scheduled     Dec 20, 2017  8:30 AM [facility] Chiropractor with Ned Dominguez DC   [facility] [place] Chiropractor (DOUGLAS [place])    [address] [place] [address]  [address]  [place] [address] [phone]              Who to contact     If you have questions or need follow up information about today's clinic visit or your schedule please contact [facility] [place] [facility] directly at [phone].  Normal or non-critical lab and imaging results will be communicated to you by MyChart, letter or phone within 4 business days after the clinic has received the results. If you do not hear from us within 7 days, please contact the clinic through MailFrontierhart or phone. If you have a critical or abnormal lab result, we will notify you by phone as soon as possible.  Submit refill requests through Ibetor or call your pharmacy and they will forward the refill request to us. Please allow 3 business days for your refill to be completed.          Additional Information About Your Visit        MyChart Information     Ibetor gives you secure access to your electronic health record. If you see a primary care provider, you can also send messages to your care team and make  appointments. If you have questions, please call your primary care clinic.  If you do not have a primary care provider, please call [phone] and they will assist you.        Care EveryWhere ID     This is your Care EveryWhere ID. This could be used by other organizations to access your [place] medical records  [id number]         Blood Pressure from Last 3 Encounters:   05/23/17 110/70   03/21/17 110/60   03/15/17 131/74    Weight from Last 3 Encounters:   05/23/17 52.2 kg (115 lb)   03/21/17 54.3 kg (119 lb 11.2 oz)   03/15/17 52.6 kg (116 lb)              We Performed the Following     ACUPUNCTURE, 1+ NEEDLES, W/O ELECTRICAL STIM; INIT 15 MIN PERSONAL CONTACT     CHIROPRAC MANIP,SPINAL,1-2 REGIONS        Primary Care Provider Office Phone # Fax #    Renny Amaya [phone]       XXX RESIGNED XXX  [facility] [address]        Equal Access to Services     ROYCE TORO : Hadii aad ku hadasho Soomaali, waaxda luqadaha, qaybta kaalmada adeegyada, waxay erasmo springer . So [facility] [place] [phone].    ATENCIÓN: Si habla español, tiene a hernández disposición servicios gratuitos de asistencia lingüística. [facility] [phone].    We comply with applicable federal civil rights laws and [place] laws. We do not discriminate on the basis of race, color, national origin, age, disability, sex, sexual orientation, or gender identity.            Thank you!     Thank you for choosing [facility] CHIROPRACTOR  for your care. Our goal is always to provide you with excellent care. Hearing back from our patients is one way we can continue to improve our services. Please take a few minutes to complete the written survey that you may receive in the mail after your visit with us. Thank you!             Your Updated Medication List - Protect others around you: Learn how to safely use, store and throw away your medicines at www.disposemymeds.org.          This list  is accurate as of: 12/6/17  9:39 AM.  Always use your most recent med list.                   Brand Name Dispense Instructions for use Diagnosis    ADVIL PO      Take 800 mg by mouth Reported on 5/23/2017        citalopram 40 MG tablet    celeXA    90 tablet    Take 1 tablet (40 mg) by mouth daily INDICATION: TO TREAT ANXIETY    Anxiety       cyanocobalamin 1000 MCG/ML injection    VITAMIN B12    30 mL    Inject 1 mL (1,000 mcg) Subcutaneous every 14 days INDICATION: FOR VITAMIN B12 SUPPLEMENTATION    Vitamin B12 deficiency without anemia       ferrous sulfate 140 (45 FE) MG Tbcr CR tablet     90 tablet    TAKE ONE TABLET BY MOUTH EVERY DAY. PLEASE TAKE WITH 4OZ OF ORANGE JUICE WITH PULP    Iron deficiency       hydrOXYzine 25 MG tablet    ATARAX    60 tablet    Take 2-4 tablets ( mg) by mouth nightly as needed for itching    Persistent insomnia, Perennial allergic rhinitis, unspecified allergic rhinitis trigger       ondansetron 8 MG tablet    ZOFRAN    15 tablet    Take 1 tablet (8 mg) by mouth every 8 hours as needed for nausea    Nausea       valACYclovir 1000 mg tablet    VALTREX    4 tablet    Take 2 tablets (2,000 mg) by mouth 2 times daily    Recurrent cold sores       vitamin D3 14462 UNITS capsule    CHOLECALCIFEROL    40 capsule    Take 1 capsule (50,000 Units) by mouth every 30 days NDICATION: TO TREAT VITAMIN D DEFICIENCY    Vitamin D deficiency

## 2017-12-20 ENCOUNTER — THERAPY VISIT (OUTPATIENT)
Dept: CHIROPRACTIC MEDICINE | Facility: CLINIC | Age: 42
End: 2017-12-20
Payer: COMMERCIAL

## 2017-12-20 DIAGNOSIS — M99.00 SEGMENTAL AND SOMATIC DYSFUNCTION OF HEAD REGION: ICD-10-CM

## 2017-12-20 DIAGNOSIS — G44.201 INTRACTABLE TENSION-TYPE HEADACHE, UNSPECIFIED CHRONICITY PATTERN: ICD-10-CM

## 2017-12-20 DIAGNOSIS — G43.119 INTRACTABLE MIGRAINE WITH AURA WITHOUT STATUS MIGRAINOSUS: ICD-10-CM

## 2017-12-20 DIAGNOSIS — M99.01 CERVICAL SEGMENT DYSFUNCTION: ICD-10-CM

## 2017-12-20 DIAGNOSIS — M54.2 CERVICALGIA: Primary | ICD-10-CM

## 2017-12-20 DIAGNOSIS — M99.02 THORACIC SEGMENT DYSFUNCTION: ICD-10-CM

## 2017-12-20 PROCEDURE — 97810 ACUP 1/> WO ESTIM 1ST 15 MIN: CPT | Mod: GA | Performed by: CHIROPRACTOR

## 2017-12-20 PROCEDURE — 98940 CHIROPRACT MANJ 1-2 REGIONS: CPT | Mod: AT | Performed by: CHIROPRACTOR

## 2017-12-20 NOTE — MR AVS SNAPSHOT
After Visit Summary   12/20/2017    Olivia Flores    MRN: [id number]           Patient Information     Date Of Birth          1975        Visit Information        Provider Department      12/20/2017 8:30 AM Ned Dominguez DC [facility] - [place] Chiropractor        Today's Diagnoses     Cervicalgia    -  1    Cervical segment dysfunction        Thoracic segment dysfunction        Intractable tension-type headache, unspecified chronicity pattern        Segmental and somatic dysfunction of head region        Intractable migraine with aura without status migrainosus           Follow-ups after your visit        Your next 10 appointments already scheduled     Jimmy 10, 2018  8:30 AM CST   DOUGLAS Chiropractor with Ned Dominguez DC   [facility] [place] Chiropractor ([facility] [place])    [address] [place] [address]  [address]  [place] [address]   [phone]            Jan 24, 2018  8:30 AM CST   DOUGLAS Chiropractor with Ned Dominguez DC   [facility] [place] Chiropractor ([facility] [place])    [address] [place] [address]  [address]  [place] [address]   [phone]            Feb 07, 2018  8:30 AM CST   DOUGLAS Chiropractor with Ned Dominguez DC   [facility] [place] Chiropractor ([facility] [place])    [address] [place] [address]  [address]  [place] [address]   [phone]              Who to contact     If you have questions or need follow up information about today's clinic visit or your schedule please contact [facility] [place] [facility] directly at [phone].  Normal or non-critical lab and imaging results will be communicated to you by MyChart, letter or phone within 4 business days after the clinic has received the results. If you do not hear from us within 7 days, please contact the clinic through MyChart or phone. If you have a critical or abnormal  lab result, we will notify you by phone as soon as possible.  Submit refill requests through [employer] or call your pharmacy and they will forward the refill request to us. Please allow 3 business days for your refill to be completed.          Additional Information About Your Visit        Holland Hapticshart Information     [employer] gives you secure access to your electronic health record. If you see a primary care provider, you can also send messages to your care team and make appointments. If you have questions, please call your primary care clinic.  If you do not have a primary care provider, please call [phone] and they will assist you.        Care EveryWhere ID     This is your Care EveryWhere ID. This could be used by other organizations to access your [place] medical records  [id number]         Blood Pressure from Last 3 Encounters:   05/23/17 110/70   03/21/17 110/60   03/15/17 131/74    Weight from Last 3 Encounters:   05/23/17 52.2 kg (115 lb)   03/21/17 54.3 kg (119 lb 11.2 oz)   03/15/17 52.6 kg (116 lb)              We Performed the Following     ACUPUNCTURE, 1+ NEEDLES, W/O ELECTRICAL STIM; INIT 15 MIN PERSONAL CONTACT     CHIROPRAC MANIP,SPINAL,1-2 REGIONS        Primary Care Provider Office Phone # Fax #    Renny Amaya [phone]       XXX RESIGNED XXX  [facility] [address]        Equal Access to Services     ROYCE TORO : Hadii jose luis Moreira, waaxda luqadaha, qaybta kaalfelipa arango . So [facility] [phone].    ATENCIÓN: Si habla español, tiene a hernández disposición servicios gratuitos de asistencia lingüística. Ami al [phone].    We comply with applicable federal civil rights laws and [place] laws. We do not discriminate on the basis of race, color, national origin, age, disability, sex, sexual orientation, or gender identity.            Thank you!     Thank you for choosing [facility]  CHIROPRACTOR  for your care. Our goal is always to provide you with excellent care. Hearing back from our patients is one way we can continue to improve our services. Please take a few minutes to complete the written survey that you may receive in the mail after your visit with us. Thank you!             Your Updated Medication List - Protect others around you: Learn how to safely use, store and throw away your medicines at www.disposemymeds.org.          This list is accurate as of: 12/20/17  9:28 AM.  Always use your most recent med list.                   Brand Name Dispense Instructions for use Diagnosis    ADVIL PO      Take 800 mg by mouth Reported on 5/23/2017        citalopram 40 MG tablet    celeXA    90 tablet    Take 1 tablet (40 mg) by mouth daily INDICATION: TO TREAT ANXIETY    Anxiety       cyanocobalamin 1000 MCG/ML injection    VITAMIN B12    30 mL    Inject 1 mL (1,000 mcg) Subcutaneous every 14 days INDICATION: FOR VITAMIN B12 SUPPLEMENTATION    Vitamin B12 deficiency without anemia       ferrous sulfate 140 (45 FE) MG Tbcr CR tablet     90 tablet    TAKE ONE TABLET BY MOUTH EVERY DAY. PLEASE TAKE WITH 4OZ OF ORANGE JUICE WITH PULP    Iron deficiency       hydrOXYzine 25 MG tablet    ATARAX    60 tablet    Take 2-4 tablets ( mg) by mouth nightly as needed for itching    Persistent insomnia, Perennial allergic rhinitis, unspecified allergic rhinitis trigger       ondansetron 8 MG tablet    ZOFRAN    15 tablet    Take 1 tablet (8 mg) by mouth every 8 hours as needed for nausea    Nausea       valACYclovir 1000 mg tablet    VALTREX    4 tablet    Take 2 tablets (2,000 mg) by mouth 2 times daily    Recurrent cold sores       vitamin D3 77193 UNITS capsule    CHOLECALCIFEROL    40 capsule    Take 1 capsule (50,000 Units) by mouth every 30 days NDICATION: TO TREAT VITAMIN D DEFICIENCY    Vitamin D deficiency

## 2017-12-20 NOTE — PROGRESS NOTES
Visit #:  16/2017      Subjective:  Olivia Flores is a 40 year old female who is seen in f/u up for:        Cervicalgia  Cervical segment dysfunction  Thoracic segment dysfunction  Intractable tension-type headache, unspecified chronicity pattern  Segmental and somatic dysfunction of head region  Intractable migraine with aura without status migrainosus.     Since last visit,   Olivia Flores reports:    Area of chief complaint:  Cervical and Thoracic :  Symptoms are graded at 5/10. Today. The pt experienced a migraine several days ago and took medication yesterday night. She notes a L sided HA that radiates in the L eye. She has been moderately stressed over the past week and that seems to have contributed. The pt denies neck pain.       Since last visit the patient feels that they are 75% improved-exacerbation/regression    Objective:  The following was observed:    P: pain elicited on palpation: OCC R, C2, C3 right, T5, T6,   A: static palpation demonstrates intersegmental asymmetry OCC, R, C2, C3 right, T5, T6,    R: motion palpation notes restricted motion  T: hypertonicity at: Sub-occipital and T-spine paraspinal Bilaterally    Segmental spinal dysfunction/restrictions found at:  OCC R, C2, C3 right, T5, T6      Assessment:    Diagnoses:      1. Cervicalgia    2. Cervical segment dysfunction    3. Thoracic segment dysfunction    4. Intractable tension-type headache, unspecified chronicity pattern    5. Segmental and somatic dysfunction of head region    6. Intractable migraine with aura without status migrainosus        Patient's condition:exacerbation/regression    Treatment effectiveness:  No increase in sx pt stable       Procedures:  CMT:   23063 Chiropractic manipulative treatment 1-2  regions performed   Cervical:  Activator C2, C3 R   Thoracic: T1/T2, T7, T8 prone diversified and activator      Modalities:  19527: Acupuncture:  Points: Lulu Points in cervical spine jaycob points of the  cervical and thoracic musculature 20 minutes prone    Therapeutic procedures:  1st rib stretching R and Left 3 minutes     Response to Treatment  Reduction of symptoms no increase in sx, pt stable    Prognosis: Guarded    Progress towards Goals: Patient is making progress towards the goal.The pt would like to run or bike without pain in the neck and upper back-the pt has reached this goal  Duration to achieve goal will be 4-8 weeks at a frequency of 1 per week       Recommendations:    Instructions:drink plenty of fluids    Follow-up:  Return to care in 2-3 weeks after the holiday

## 2017-12-28 ENCOUNTER — OFFICE VISIT (OUTPATIENT)
Dept: URGENT CARE | Facility: URGENT CARE | Age: 42
End: 2017-12-28
Payer: COMMERCIAL

## 2017-12-28 VITALS
HEART RATE: 72 BPM | OXYGEN SATURATION: 99 % | DIASTOLIC BLOOD PRESSURE: 68 MMHG | SYSTOLIC BLOOD PRESSURE: 110 MMHG | TEMPERATURE: 98.2 F

## 2017-12-28 DIAGNOSIS — J03.90 TONSILLITIS: Primary | ICD-10-CM

## 2017-12-28 DIAGNOSIS — R07.0 THROAT PAIN: ICD-10-CM

## 2017-12-28 LAB
DEPRECATED S PYO AG THROAT QL EIA: NORMAL
SPECIMEN SOURCE: NORMAL

## 2017-12-28 PROCEDURE — 99213 OFFICE O/P EST LOW 20 MIN: CPT

## 2017-12-28 PROCEDURE — 87081 CULTURE SCREEN ONLY: CPT | Performed by: PHYSICIAN ASSISTANT

## 2017-12-28 PROCEDURE — 87880 STREP A ASSAY W/OPTIC: CPT | Performed by: PHYSICIAN ASSISTANT

## 2017-12-28 RX ORDER — CEFDINIR 300 MG/1
300 CAPSULE ORAL 2 TIMES DAILY
Qty: 20 CAPSULE | Refills: 0 | Status: SHIPPED | OUTPATIENT
Start: 2017-12-28 | End: 2018-01-07

## 2017-12-28 NOTE — MR AVS SNAPSHOT
After Visit Summary   12/28/2017    Olivia Flores    MRN: [id number]           Patient Information     Date Of Birth          1975        Visit Information        Provider Department      12/28/2017 9:00 AM Provider, Sridhar Potts MD [facility]        Today's Diagnoses     Tonsillitis    -  1    Throat pain           Follow-ups after your visit        Your next 10 appointments already scheduled     Jimmy 10, 2018  8:30 AM CST   [facility] Chiropractor with Ned Dominguez DC   [facility] [place] [facility] ([facility] [place])    [address] [place] [address]  [address]  [place] [address]   [phone]            Jan 24, 2018  8:30 AM CST   DOUGLAS Chiropractor with Ned Dominguez DC   [facility] [place] [facility] ([facility] [place])    [address] [place] [address]  [address]  [place] [address]   [phone]            Feb 07, 2018  8:30 AM CST   DOUGLAS Chiropractor with Ned Dominguez DC   [facility] [place] [facility] ([facility] [place])    [address] [place] [address]  [address]  [place] [address]   [phone]              Who to contact     If you have questions or need follow up information about today's clinic visit or your schedule please contact [facility] directly at [phone].  Normal or non-critical lab and imaging results will be communicated to you by MyChart, letter or phone within 4 business days after the clinic has received the results. If you do not hear from us within 7 days, please contact the clinic through MyChart or phone. If you have a critical or abnormal lab result, we will notify you by phone as soon as possible.  Submit refill requests through [employer] or call your pharmacy and they will forward the refill request to us. Please allow 3 business days for your refill to be completed.          Additional Information About Your  Visit        [employer] Information     [employer] gives you secure access to your electronic health record. If you see a primary care provider, you can also send messages to your care team and make appointments. If you have questions, please call your primary care clinic.  If you do not have a primary care provider, please call [phone] and they will assist you.        Care EveryWhere ID     This is your Care EveryWhere ID. This could be used by other organizations to access your [facility] medical records  [id number]        Your Vitals Were     Pulse Temperature Pulse Oximetry             72 98.2  F (36.8  C) (Oral) 99%          Blood Pressure from Last 3 Encounters:   12/28/17 110/68   05/23/17 110/70   03/21/17 110/60    Weight from Last 3 Encounters:   05/23/17 115 lb (52.2 kg)   03/21/17 119 lb 11.2 oz (54.3 kg)   03/15/17 116 lb (52.6 kg)              We Performed the Following     Beta strep group A culture     Rapid strep screen          Today's Medication Changes          These changes are accurate as of: 12/28/17  9:30 AM.  If you have any questions, ask your nurse or doctor.               Start taking these medicines.        Dose/Directions    cefdinir 300 MG capsule   Commonly known as:  OMNICEF   Used for:  Tonsillitis   Started by:  Provider, Sridhar Potts MD        Dose:  300 mg   Take 1 capsule (300 mg) by mouth 2 times daily for 10 days   Quantity:  20 capsule   Refills:  0            Where to get your medicines      These medications were sent to [facility] [address] [address]     Phone:  [phone]     cefdinir 300 MG capsule                Primary Care Provider Office Phone # Fax #    Renny Amaya [phone]       XXX RESIGNED XXX  [facility] [address]        Equal Access to Services     ROYCE TORO AH: Santa Moreira, jeannie flowers, felipa patel  tangela pugabrentcarson stoutKarenaajose ah. So [facility] [phone].    ATENCIÓN: Si fabiana carlton, tiene a hernández disposición servicios gratuitos de asistencia lingüística. Ami al [phone].    We comply with applicable federal civil rights laws and [place] laws. We do not discriminate on the basis of race, color, national origin, age, disability, sex, sexual orientation, or gender identity.            Thank you!     Thank you for choosing [facility]  for your care. Our goal is always to provide you with excellent care. Hearing back from our patients is one way we can continue to improve our services. Please take a few minutes to complete the written survey that you may receive in the mail after your visit with us. Thank you!             Your Updated Medication List - Protect others around you: Learn how to safely use, store and throw away your medicines at www.disposemymeds.org.          This list is accurate as of: 12/28/17  9:30 AM.  Always use your most recent med list.                   Brand Name Dispense Instructions for use Diagnosis    ADVIL PO      Take 800 mg by mouth Reported on 5/23/2017        cefdinir 300 MG capsule    OMNICEF    20 capsule    Take 1 capsule (300 mg) by mouth 2 times daily for 10 days    Tonsillitis       citalopram 40 MG tablet    celeXA    90 tablet    Take 1 tablet (40 mg) by mouth daily INDICATION: TO TREAT ANXIETY    Anxiety       cyanocobalamin 1000 MCG/ML injection    VITAMIN B12    30 mL    Inject 1 mL (1,000 mcg) Subcutaneous every 14 days INDICATION: FOR VITAMIN B12 SUPPLEMENTATION    Vitamin B12 deficiency without anemia       ferrous sulfate 140 (45 FE) MG Tbcr CR tablet     90 tablet    TAKE ONE TABLET BY MOUTH EVERY DAY. PLEASE TAKE WITH 4OZ OF ORANGE JUICE WITH PULP    Iron deficiency       hydrOXYzine 25 MG tablet    ATARAX    60 tablet    Take 2-4 tablets ( mg) by mouth nightly as needed for itching    Persistent insomnia, Perennial allergic rhinitis,  unspecified allergic rhinitis trigger       ondansetron 8 MG tablet    ZOFRAN    15 tablet    Take 1 tablet (8 mg) by mouth every 8 hours as needed for nausea    Nausea       valACYclovir 1000 mg tablet    VALTREX    4 tablet    Take 2 tablets (2,000 mg) by mouth 2 times daily    Recurrent cold sores       vitamin D3 30351 UNITS capsule    CHOLECALCIFEROL    40 capsule    Take 1 capsule (50,000 Units) by mouth every 30 days NDICATION: TO TREAT VITAMIN D DEFICIENCY    Vitamin D deficiency

## 2017-12-28 NOTE — PROGRESS NOTES
SUBJECTIVE:Olivia Flores is a 42 year old female with a chief complaint of sore throat.    Onset of symptoms was 1 day(s) ago.    Course of illness: still present.    Severity moderate  Current and Associated symptoms: chills  Treatment measures tried include Tylenol/Ibuprofen.  Predisposing factors include None.    Past Medical History:   Diagnosis Date     BAILEY (generalized anxiety disorder)      Migraine with aura      Pelvic pain in female     due right ovarian cysts     Allergies   Allergen Reactions     Imitrex [Sumatriptan]      Neck pain, dizziness, nausea     Social History   Substance Use Topics     Smoking status: Never Smoker     Smokeless tobacco: Never Used     Alcohol use Yes      Comment: social       ROS:  SKIN: no rash  GI: no vomiting    OBJECTIVE:   /68 (Cuff Size: Adult Regular)  Pulse 72  Temp 98.2  F (36.8  C) (Oral)  SpO2 99%GENERAL APPEARANCE: healthy, alert and no distress  EYES: EOMI,  PERRL, conjunctiva clear  HENT: ear canals and TM's normal.  Nose normal.  Pharynx erythematous with some exudate noted.  NECK: supple, non-tender to palpation, no adenopathy noted  RESP: lungs clear to auscultation - no rales, rhonchi or wheezes  SKIN: no suspicious lesions or rashes    Rapid Strep test is negative; await throat culture results.      ICD-10-CM    1. Tonsillitis J03.90 cefdinir (OMNICEF) 300 MG capsule   2. Throat pain R07.0 Rapid strep screen     Beta strep group A culture       Symptomatic treat with gargles, lozenges, and OTC analgesic as needed.  Follow-up with primary clinic if not improving.

## 2017-12-28 NOTE — NURSING NOTE
"Chief Complaint   Patient presents with     Pharyngitis     last night       Initial /68 (Cuff Size: Adult Regular)  Pulse 72  Temp 98.2  F (36.8  C) (Oral)  SpO2 99% Estimated body mass index is 21.03 kg/(m^2) as calculated from the following:    Height as of 3/21/17: 5' 2\" (1.575 m).    Weight as of 5/23/17: 115 lb (52.2 kg).  Medication Reconciliation: complete    "

## 2017-12-29 LAB
BACTERIA SPEC CULT: NORMAL
SPECIMEN SOURCE: NORMAL

## 2018-01-10 ENCOUNTER — THERAPY VISIT (OUTPATIENT)
Dept: CHIROPRACTIC MEDICINE | Facility: CLINIC | Age: 43
End: 2018-01-10
Payer: COMMERCIAL

## 2018-01-10 DIAGNOSIS — M99.02 THORACIC SEGMENT DYSFUNCTION: ICD-10-CM

## 2018-01-10 DIAGNOSIS — M99.01 CERVICAL SEGMENT DYSFUNCTION: ICD-10-CM

## 2018-01-10 DIAGNOSIS — G44.201 INTRACTABLE TENSION-TYPE HEADACHE, UNSPECIFIED CHRONICITY PATTERN: ICD-10-CM

## 2018-01-10 DIAGNOSIS — M54.2 CERVICALGIA: Primary | ICD-10-CM

## 2018-01-10 DIAGNOSIS — M99.00 SEGMENTAL AND SOMATIC DYSFUNCTION OF HEAD REGION: ICD-10-CM

## 2018-01-10 DIAGNOSIS — G43.119 INTRACTABLE MIGRAINE WITH AURA WITHOUT STATUS MIGRAINOSUS: ICD-10-CM

## 2018-01-10 PROCEDURE — 97810 ACUP 1/> WO ESTIM 1ST 15 MIN: CPT | Mod: GA | Performed by: CHIROPRACTOR

## 2018-01-10 PROCEDURE — 98940 CHIROPRACT MANJ 1-2 REGIONS: CPT | Mod: AT | Performed by: CHIROPRACTOR

## 2018-01-10 NOTE — MR AVS SNAPSHOT
After Visit Summary   1/10/2018    Olivia Flores    MRN: [id number]           Patient Information     Date Of Birth          1975        Visit Information        Provider Department      1/10/2018 8:30 AM Ned Dominguez DC [facility] [place] [facility]        Today's Diagnoses     Cervicalgia    -  1    Cervical segment dysfunction        Thoracic segment dysfunction        Intractable tension-type headache, unspecified chronicity pattern        Segmental and somatic dysfunction of head region        Intractable migraine with aura without status migrainosus           Follow-ups after your visit        Your next 10 appointments already scheduled     Jan 24, 2018  8:30 AM CST   DOUGLAS Chiropractor with Ned Dominguez DC   [facility] [place] [facility] ([facility] [place])    [address] [place] [address]  [address]  [place] [address]   [phone]            Feb 07, 2018  8:30 AM CST   DOUGLAS Chiropractor with Ned Dominguez DC   [facility] [place] [facility] ([facility] [place])    [address] [place] [address]  [address]  [place] [address]   [phone]              Who to contact     If you have questions or need follow up information about today's clinic visit or your schedule please contact [facility] [place] [facility] directly at [phone].  Normal or non-critical lab and imaging results will be communicated to you by MyChart, letter or phone within 4 business days after the clinic has received the results. If you do not hear from us within 7 days, please contact the clinic through MyChart or phone. If you have a critical or abnormal lab result, we will notify you by phone as soon as possible.  Submit refill requests through [employer] or call your pharmacy and they will forward the refill request to us. Please allow 3 business days for your refill to be completed.           Additional Information About Your Visit        MyChart Information     [employer] gives you secure access to your electronic health record. If you see a primary care provider, you can also send messages to your care team and make appointments. If you have questions, please call your primary care clinic.  If you do not have a primary care provider, please call [phone] and they will assist you.        Care EveryWhere ID     This is your Care EveryWhere ID. This could be used by other organizations to access your [facility] medical records  [id number]         Blood Pressure from Last 3 Encounters:   12/28/17 110/68   05/23/17 110/70   03/21/17 110/60    Weight from Last 3 Encounters:   05/23/17 52.2 kg (115 lb)   03/21/17 54.3 kg (119 lb 11.2 oz)   03/15/17 52.6 kg (116 lb)              We Performed the Following     ACUPUNCTURE, 1+ NEEDLES, W/O ELECTRICAL STIM; INIT 15 MIN PERSONAL CONTACT     CHIROPRAC MANIP,SPINAL,1-2 REGIONS        Primary Care Provider Office Phone # Fax #    Renny Amaya [phone]       XXX RESIGNED XXX  [facility] [address]        Equal Access to Services     ROYCE TORO : Hadii jose luis Moreira, waaxda luqadaha, qaybta kaalmada malgorzatada, felipa neal. So [facility] [phone].    ATENCIÓN: Si habla español, tiene a hernández disposición servicios gratuitos de asistencia lingüística. [facility] [phone].    We comply with applicable federal civil rights laws and [place] laws. We do not discriminate on the basis of race, color, national origin, age, disability, sex, sexual orientation, or gender identity.            Thank you!     Thank you for choosing [facility]  for your care. Our goal is always to provide you with excellent care. Hearing back from our patients is one way we can continue to improve our services. Please take a few minutes to complete the written survey that you may receive  in the mail after your visit with us. Thank you!             Your Updated Medication List - Protect others around you: Learn how to safely use, store and throw away your medicines at www.disposemymeds.org.          This list is accurate as of: 1/10/18 11:14 AM.  Always use your most recent med list.                   Brand Name Dispense Instructions for use Diagnosis    ADVIL PO      Take 800 mg by mouth Reported on 5/23/2017        citalopram 40 MG tablet    celeXA    90 tablet    Take 1 tablet (40 mg) by mouth daily INDICATION: TO TREAT ANXIETY    Anxiety       cyanocobalamin 1000 MCG/ML injection    VITAMIN B12    30 mL    Inject 1 mL (1,000 mcg) Subcutaneous every 14 days INDICATION: FOR VITAMIN B12 SUPPLEMENTATION    Vitamin B12 deficiency without anemia       ferrous sulfate 140 (45 FE) MG Tbcr CR tablet     90 tablet    TAKE ONE TABLET BY MOUTH EVERY DAY. PLEASE TAKE WITH 4OZ OF ORANGE JUICE WITH PULP    Iron deficiency       hydrOXYzine 25 MG tablet    ATARAX    60 tablet    Take 2-4 tablets ( mg) by mouth nightly as needed for itching    Persistent insomnia, Perennial allergic rhinitis, unspecified allergic rhinitis trigger       ondansetron 8 MG tablet    ZOFRAN    15 tablet    Take 1 tablet (8 mg) by mouth every 8 hours as needed for nausea    Nausea       valACYclovir 1000 mg tablet    VALTREX    4 tablet    Take 2 tablets (2,000 mg) by mouth 2 times daily    Recurrent cold sores       vitamin D3 76702 UNITS capsule    CHOLECALCIFEROL    40 capsule    Take 1 capsule (50,000 Units) by mouth every 30 days NDICATION: TO TREAT VITAMIN D DEFICIENCY    Vitamin D deficiency

## 2018-01-10 NOTE — PROGRESS NOTES
Visit #:  1/2018      Subjective:  Olivia Flores is a 40 year old female who is seen in f/u up for:        Cervicalgia  Cervical segment dysfunction  Thoracic segment dysfunction  Intractable tension-type headache, unspecified chronicity pattern  Segmental and somatic dysfunction of head region  Intractable migraine with aura without status migrainosus.     Since last visit,   Olivia Flores reports:    Area of chief complaint:  Cervical and Thoracic :  Symptoms are graded at 1-2/10. Today. The pt denies a migraine for 3 weeks until yesterday night when she experienced a moderate to severe HA. Stress may be a factor. The pt denies weakness or other unusual sx.     Since last visit the patient feels that they are 90-95% overall until yesterday.       Objective:  The following was observed:    P: pain elicited on palpation: OCC R, C2, C3 right, T5, T6,   A: static palpation demonstrates intersegmental asymmetry OCC, R, C2, C3 right, T5, T6,    R: motion palpation notes restricted motion  T: hypertonicity at: Sub-occipital and T-spine paraspinal Bilaterally    Segmental spinal dysfunction/restrictions found at:  OCC R, C2, C3 right, T5, T6      Assessment:    Diagnoses:      1. Cervicalgia    2. Cervical segment dysfunction    3. Thoracic segment dysfunction    4. Intractable tension-type headache, unspecified chronicity pattern    5. Segmental and somatic dysfunction of head region    6. Intractable migraine with aura without status migrainosus        Patient's condition:exacerbation/regression but stable overall     Treatment effectiveness:  No increase in sx pt stable   Pt responding well to treatment      Procedures:  CMT:   52545 Chiropractic manipulative treatment 1-2  regions performed   Cervical:  Activator C2, C3 R   Thoracic: T1/T2, T7, T8 prone diversified and activator      Modalities:  88003: Acupuncture:  Points: Lulu Points in cervical spine jaycob points of the cervical and thoracic  musculature 20 minutes prone    Therapeutic procedures:  1st rib stretching R and Left 3 minutes     Response to Treatment  Reduction of symptoms no increase in sx, pt stable    Prognosis: Guarded    Progress towards Goals: Patient is making progress towards the goal.The pt would like to run or bike without pain in the neck and upper back-the pt has reached this goal  Duration to achieve goal will be 4-8 weeks at a frequency of 1 per week       Recommendations:    Instructions:drink plenty of fluids    Follow-up:  Return to care in 2-3 weeks

## 2018-01-24 ENCOUNTER — THERAPY VISIT (OUTPATIENT)
Dept: CHIROPRACTIC MEDICINE | Facility: CLINIC | Age: 43
End: 2018-01-24
Payer: COMMERCIAL

## 2018-01-24 DIAGNOSIS — M99.00 SEGMENTAL AND SOMATIC DYSFUNCTION OF HEAD REGION: ICD-10-CM

## 2018-01-24 DIAGNOSIS — M99.02 THORACIC SEGMENT DYSFUNCTION: ICD-10-CM

## 2018-01-24 DIAGNOSIS — M99.01 CERVICAL SEGMENT DYSFUNCTION: ICD-10-CM

## 2018-01-24 DIAGNOSIS — G43.119 INTRACTABLE MIGRAINE WITH AURA WITHOUT STATUS MIGRAINOSUS: ICD-10-CM

## 2018-01-24 DIAGNOSIS — G44.201 INTRACTABLE TENSION-TYPE HEADACHE, UNSPECIFIED CHRONICITY PATTERN: ICD-10-CM

## 2018-01-24 DIAGNOSIS — M54.2 CERVICALGIA: Primary | ICD-10-CM

## 2018-01-24 PROCEDURE — 98940 CHIROPRACT MANJ 1-2 REGIONS: CPT | Mod: AT | Performed by: CHIROPRACTOR

## 2018-01-24 PROCEDURE — 97810 ACUP 1/> WO ESTIM 1ST 15 MIN: CPT | Mod: GA | Performed by: CHIROPRACTOR

## 2018-01-24 NOTE — MR AVS SNAPSHOT
After Visit Summary   1/24/2018    Olivia Flores    MRN: [id number]           Patient Information     Date Of Birth          1975        Visit Information        Provider Department      1/24/2018 8:30 AM Ned Dominguez DC [facility] [place] [facility]        Today's Diagnoses     Cervicalgia    -  1    Cervical segment dysfunction        Thoracic segment dysfunction        Intractable tension-type headache, unspecified chronicity pattern        Segmental and somatic dysfunction of head region        Intractable migraine with aura without status migrainosus           Follow-ups after your visit        Your next 10 appointments already scheduled     Feb 07, 2018  8:30 AM [facility] Chiropractor with Ned Dominguez DC   [facility] [place] [facility] ([place] [place])    [address] [place] [address]  [address]  [place] [id number] [phone]              Who to contact     If you have questions or need follow up information about today's clinic visit or your schedule please contact [facility] [place] [facility] directly at [phone].  Normal or non-critical lab and imaging results will be communicated to you by MyChart, letter or phone within 4 business days after the clinic has received the results. If you do not hear from us within 7 days, please contact the clinic through ezTaxihart or phone. If you have a critical or abnormal lab result, we will notify you by phone as soon as possible.  Submit refill requests through [employer] or call your pharmacy and they will forward the refill request to us. Please allow 3 business days for your refill to be completed.          Additional Information About Your Visit        MyChart Information     [employer] gives you secure access to your electronic health record. If you see a primary care provider, you can also send messages to your care team and make  appointments. If you have questions, please call your primary care clinic.  If you do not have a primary care provider, please call [phone] and they will assist you.        Care EveryWhere ID     This is your Care EveryWhere ID. This could be used by other organizations to access your [place] medical records  [id number]         Blood Pressure from Last 3 Encounters:   12/28/17 110/68   05/23/17 110/70   03/21/17 110/60    Weight from Last 3 Encounters:   05/23/17 52.2 kg (115 lb)   03/21/17 54.3 kg (119 lb 11.2 oz)   03/15/17 52.6 kg (116 lb)              We Performed the Following     ACUPUNCTURE, 1+ NEEDLES, W/O ELECTRICAL STIM; INIT 15 MIN PERSONAL CONTACT     CHIROPRAC MANIP,SPINAL,1-2 REGIONS        Primary Care Provider Office Phone # Fax #    Renny Amaya [phone]       XXX RESIGNED XXX  [facility] [address]        Equal Access to Services     ROYCE TORO : Hadii aad ku hadasho Soomaali, waaxda luqadaha, qaybta kaalmada adeegyada, waxay erasmo springer . So [facility] [phone].    ATENCIÓN: Si habla español, tiene a hernández disposición servicios gratuitos de asistencia lingüística. [facility] [phone].    We comply with applicable federal civil rights laws and [place] laws. We do not discriminate on the basis of race, color, national origin, age, disability, sex, sexual orientation, or gender identity.            Thank you!     Thank you for choosing [facility]  for your care. Our goal is always to provide you with excellent care. Hearing back from our patients is one way we can continue to improve our services. Please take a few minutes to complete the written survey that you may receive in the mail after your visit with us. Thank you!             Your Updated Medication List - Protect others around you: Learn how to safely use, store and throw away your medicines at www.disposemymeds.org.          This list  is accurate as of 1/24/18 10:27 AM.  Always use your most recent med list.                   Brand Name Dispense Instructions for use Diagnosis    ADVIL PO      Take 800 mg by mouth Reported on 5/23/2017        citalopram 40 MG tablet    celeXA    90 tablet    Take 1 tablet (40 mg) by mouth daily INDICATION: TO TREAT ANXIETY    Anxiety       cyanocobalamin 1000 MCG/ML injection    VITAMIN B12    30 mL    Inject 1 mL (1,000 mcg) Subcutaneous every 14 days INDICATION: FOR VITAMIN B12 SUPPLEMENTATION    Vitamin B12 deficiency without anemia       ferrous sulfate 140 (45 FE) MG Tbcr CR tablet     90 tablet    TAKE ONE TABLET BY MOUTH EVERY DAY. PLEASE TAKE WITH 4OZ OF ORANGE JUICE WITH PULP    Iron deficiency       hydrOXYzine 25 MG tablet    ATARAX    60 tablet    Take 2-4 tablets ( mg) by mouth nightly as needed for itching    Persistent insomnia, Perennial allergic rhinitis, unspecified allergic rhinitis trigger       ondansetron 8 MG tablet    ZOFRAN    15 tablet    Take 1 tablet (8 mg) by mouth every 8 hours as needed for nausea    Nausea       valACYclovir 1000 mg tablet    VALTREX    4 tablet    Take 2 tablets (2,000 mg) by mouth 2 times daily    Recurrent cold sores       vitamin D3 87583 UNITS capsule    CHOLECALCIFEROL    40 capsule    Take 1 capsule (50,000 Units) by mouth every 30 days NDICATION: TO TREAT VITAMIN D DEFICIENCY    Vitamin D deficiency
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
## 2018-02-07 ENCOUNTER — THERAPY VISIT (OUTPATIENT)
Dept: CHIROPRACTIC MEDICINE | Facility: CLINIC | Age: 43
End: 2018-02-07
Payer: COMMERCIAL

## 2018-02-07 DIAGNOSIS — G43.119 INTRACTABLE MIGRAINE WITH AURA WITHOUT STATUS MIGRAINOSUS: ICD-10-CM

## 2018-02-07 DIAGNOSIS — M99.02 THORACIC SEGMENT DYSFUNCTION: ICD-10-CM

## 2018-02-07 DIAGNOSIS — G44.201 INTRACTABLE TENSION-TYPE HEADACHE, UNSPECIFIED CHRONICITY PATTERN: ICD-10-CM

## 2018-02-07 DIAGNOSIS — M99.01 CERVICAL SEGMENT DYSFUNCTION: ICD-10-CM

## 2018-02-07 DIAGNOSIS — M99.00 SEGMENTAL AND SOMATIC DYSFUNCTION OF HEAD REGION: ICD-10-CM

## 2018-02-07 DIAGNOSIS — M54.2 CERVICALGIA: Primary | ICD-10-CM

## 2018-02-07 PROCEDURE — 98940 CHIROPRACT MANJ 1-2 REGIONS: CPT | Performed by: CHIROPRACTOR

## 2018-02-07 PROCEDURE — 97810 ACUP 1/> WO ESTIM 1ST 15 MIN: CPT | Mod: GA | Performed by: CHIROPRACTOR

## 2018-02-07 NOTE — PROGRESS NOTES
Visit #:  4/2018      Subjective:  Olivia Flores is a 40 year old female who is seen in f/u up for:        Cervicalgia  Cervical segment dysfunction  Thoracic segment dysfunction  Intractable tension-type headache, unspecified chronicity pattern  Segmental and somatic dysfunction of head region  Intractable migraine with aura without status migrainosus.     Since last visit,   Olivia Flores reports:    Area of chief complaint:  Cervical and Thoracic :  Symptoms are graded at 1-2/10. Today. The pt reports mild HA sx that she did not have to take medication for the pain. She states stress affects neck tension in addition to hormones related to her cycle. She denies weakness in the extremities or other unusual sx.     Since last visit the patient feels that they are 90-95% overall     Objective:  The following was observed:    P: pain elicited on palpation: OCC R, C2, C3 right, T5, T6,   A: static palpation demonstrates intersegmental asymmetry OCC, R, C2, C3 right, T5, T6,    R: motion palpation notes restricted motion  T: hypertonicity at: Sub-occipital and T-spine paraspinal Bilaterally    Segmental spinal dysfunction/restrictions found at:  OCC R, C2, C3 right, T5, T6      Assessment:    Diagnoses:      1. Cervicalgia    2. Cervical segment dysfunction    3. Thoracic segment dysfunction    4. Intractable tension-type headache, unspecified chronicity pattern    5. Segmental and somatic dysfunction of head region    6. Intractable migraine with aura without status migrainosus        Patient's condition:exacerbation/regression but stable overall     Treatment effectiveness:  No increase in sx pt stable   Pt responding well to treatment      Procedures:  CMT:   41343 Chiropractic manipulative treatment 1-2  regions performed   Cervical:  Activator C2, C3 R   Thoracic: T1/T2, T7, T8 prone diversified and activator      Modalities:  77533: Acupuncture:  Points: Lulu Points in cervical spine jaycob points of  the cervical and thoracic musculature 20 minutes prone    Therapeutic procedures:  1st rib stretching R and Left 3 minutes     Response to Treatment  Reduction of symptoms no increase in sx, pt stable    Prognosis: Guarded    Progress towards Goals: Patient is making progress towards the goal.The pt would like to run or bike without pain in the neck and upper back-the pt has reached this goal  Duration to achieve goal will be 4-8 weeks at a frequency of 1 per week       Recommendations:    Instructions:drink plenty of fluids    Follow-up:  Return to care in 2 weeks with  reduction of care

## 2018-03-07 ENCOUNTER — THERAPY VISIT (OUTPATIENT)
Dept: CHIROPRACTIC MEDICINE | Facility: CLINIC | Age: 43
End: 2018-03-07
Payer: COMMERCIAL

## 2018-03-07 DIAGNOSIS — G43.119 INTRACTABLE MIGRAINE WITH AURA WITHOUT STATUS MIGRAINOSUS: ICD-10-CM

## 2018-03-07 DIAGNOSIS — M99.01 CERVICAL SEGMENT DYSFUNCTION: ICD-10-CM

## 2018-03-07 DIAGNOSIS — G44.201 INTRACTABLE TENSION-TYPE HEADACHE, UNSPECIFIED CHRONICITY PATTERN: ICD-10-CM

## 2018-03-07 DIAGNOSIS — M54.2 CERVICALGIA: Primary | ICD-10-CM

## 2018-03-07 DIAGNOSIS — M99.00 SEGMENTAL AND SOMATIC DYSFUNCTION OF HEAD REGION: ICD-10-CM

## 2018-03-07 DIAGNOSIS — M99.02 THORACIC SEGMENT DYSFUNCTION: ICD-10-CM

## 2018-03-07 PROCEDURE — 98940 CHIROPRACT MANJ 1-2 REGIONS: CPT | Performed by: CHIROPRACTOR

## 2018-03-07 PROCEDURE — 97810 ACUP 1/> WO ESTIM 1ST 15 MIN: CPT | Mod: GA | Performed by: CHIROPRACTOR

## 2018-03-07 NOTE — PROGRESS NOTES
Visit #:  4/2018      Subjective:  Olivia Flores is a 40 year old female who is seen in f/u up for:        Cervicalgia  Cervical segment dysfunction  Thoracic segment dysfunction  Intractable tension-type headache, unspecified chronicity pattern  Segmental and somatic dysfunction of head region  Intractable migraine with aura without status migrainosus.     Since last visit,   Olivia Flores reports:    Area of chief complaint:  Cervical and Thoracic :  Symptoms are graded at 0-1/10. Today. The pt reports no HA sx for 3 weeks and is pleased with her progress. She would like to decrease her treatment  she feels she is much more stable overall. Today she notes tension at the base of the neck and in both shoulders. She continues to experience stress however she denies HA sx. The pt is pleased with her improvement.         Since last visit the patient feels that they are 90-95% overall     Objective:  The following was observed:    P: pain elicited on palpation: OCC R, C2, C3 right, T5, T6,   A: static palpation demonstrates intersegmental asymmetry OCC, R, C2, C3 right, T5, T6,    R: motion palpation notes restricted motion  T: hypertonicity at: Sub-occipital and T-spine paraspinal Bilaterally    Segmental spinal dysfunction/restrictions found at:  OCC R, C2, C3 right, T5, T6      Assessment:    Diagnoses:      1. Cervicalgia    2. Cervical segment dysfunction    3. Thoracic segment dysfunction    4. Intractable tension-type headache, unspecified chronicity pattern    5. Segmental and somatic dysfunction of head region    6. Intractable migraine with aura without status migrainosus        Patient's condition:exacerbation/regression but stable overall     Treatment effectiveness:  No increase in sx pt stable   Pt responding well to treatment      Procedures:  CMT:   45840 Chiropractic manipulative treatment 1-2  regions performed   Cervical:  Activator C2, C3 R   Thoracic: T1/T2, T7, T8 prone diversified and  activator      Modalities:  96180: Acupuncture:  Points: Lulu Points in cervical spine jaycob points of the cervical and thoracic musculature 20 minutes prone    Therapeutic procedures:  1st rib stretching R and Left 3 minutes     Response to Treatment  Reduction of symptoms no increase in sx, pt stable    Prognosis: Guarded    Progress towards Goals: Patient is making progress towards the goal.The pt would like to run or bike without pain in the neck and upper back-the pt has reached this goal  Duration to achieve goal will be 4-8 weeks at a frequency of 1 per week       Recommendations:    Instructions:drink plenty of fluids    Follow-up:  Return to care in 4 weeks with  reduction of care

## 2018-03-14 ENCOUNTER — TELEPHONE (OUTPATIENT)
Dept: CARDIOLOGY | Facility: CLINIC | Age: 43
End: 2018-03-14

## 2018-03-14 NOTE — TELEPHONE ENCOUNTER
Appointment made per [employer] message.   Patient called. States she has been waking up with dull pain in left chest area. It lasts about one minute with intensity and then slowly diminishes throughout the morning. This has been occurring about every other morning for the past month.   Shortness of breath noted throughout the day, with the feeling that she needs to take a big breath, catch her breath. Notes at time her heart rate fast for her at 100-110 bpm per her wrist watch/fit bit. States she thinks it is regular. Normal heart rate has been in the 60's. Not noting slow heart rates. Does continue to run 3 miles at a time.   States she has been encouraged to f/u with cardiology by her PMD.   Did have a sleep study after seeing Dr. Connolly in the past and reports no sleep apnea. This was done at [place] and is in Epic chart.

## 2018-03-21 ENCOUNTER — OFFICE VISIT (OUTPATIENT)
Dept: CARDIOLOGY | Facility: CLINIC | Age: 43
End: 2018-03-21
Payer: COMMERCIAL

## 2018-03-21 VITALS
WEIGHT: 123 LBS | DIASTOLIC BLOOD PRESSURE: 72 MMHG | HEART RATE: 48 BPM | SYSTOLIC BLOOD PRESSURE: 118 MMHG | BODY MASS INDEX: 22.63 KG/M2 | HEIGHT: 62 IN

## 2018-03-21 DIAGNOSIS — R06.02 SOB (SHORTNESS OF BREATH): Primary | ICD-10-CM

## 2018-03-21 PROCEDURE — 93000 ELECTROCARDIOGRAM COMPLETE: CPT | Performed by: INTERNAL MEDICINE

## 2018-03-21 PROCEDURE — 99213 OFFICE O/P EST LOW 20 MIN: CPT | Performed by: INTERNAL MEDICINE

## 2018-03-21 NOTE — MR AVS SNAPSHOT
After Visit Summary   3/21/2018    Olivia Flores    MRN: [id number]           Patient Information     Date Of Birth          1975        Visit Information        Provider Department      3/21/2018 8:15 AM Orlando Neff MD [facility]        Today's Diagnoses     SOB (shortness of breath)    -  1       Follow-ups after your visit        Your next 10 appointments already scheduled     Mar 28, 2018  8:30 AM CDT   [facility] Chiropractor with Ned Dominguez DC   [facility] [place] [facility] ([facility] [place])    [address]  [address]  [place] [address]   [phone]            Apr 18, 2018  8:30 AM CDT   DOUGLAS Chiropractor with Ned Dominguez DC   [facility] - [place] [facility] (DOU[place]AS [place])    [address]  [address]  [facility] [place] [address]   [phone]              Who to contact     If you have questions or need follow up information about today's clinic visit or your schedule please contact [facility] directly at [phone].  Normal or non-critical lab and imaging results will be communicated to you by Yobblehart, letter or phone within 4 business days after the clinic has received the results. If you do not hear from us within 7 days, please contact the clinic through Yobble[employer] or phone. If you have a critical or abnormal lab result, we will notify you by phone as soon as possible.  Submit refill requests through [employer] or call your pharmacy and they will forward the refill request to us. Please allow 3 business days for your refill to be completed.          Additional Information About Your Visit        Yobblehart Information     [employer] gives you secure access to your electronic health record. If you see a primary care provider, you can also send messages to your care team and make appointments. If you have  "questions, please call your primary care clinic.  If you do not have a primary care provider, please call [phone] and they will assist you.        Care EveryWhere ID     This is your Care EveryWhere ID. This could be used by other organizations to access your [place] medical records  [id number]        Your Vitals Were     Pulse Height BMI (Body Mass Index)             48 1.575 m (5' 2\") 22.5 kg/m2          Blood Pressure from Last 3 Encounters:   03/21/18 118/72   12/28/17 110/68   05/23/17 110/70    Weight from Last 3 Encounters:   03/21/18 55.8 kg (123 lb)   05/23/17 52.2 kg (115 lb)   03/21/17 54.3 kg (119 lb 11.2 oz)              We Performed the Following     EKG 12-lead complete w/read - Clinics (performed today)        Primary Care Provider Office Phone # Fax #    Krystina Frias [phone]       [address] [place] [id number]        Equal Access to Services     [facility]: Hadii aad ku hadasho Soomaali, waaxda luqadaha, qaybta kaalmada adeegyada, waxwilmar springer . So [facility] [phone].    ATENCIÓN: Si habla español, tiene a hernández disposición servicios gratuitos de asistencia lingüística. Llame al [phone].    We comply with applicable federal civil rights laws and [place] laws. We do not discriminate on the basis of race, color, national origin, age, disability, sex, sexual orientation, or gender identity.            Thank you!     Thank you for choosing [facility]  for your care. Our goal is always to provide you with excellent care. Hearing back from our patients is one way we can continue to improve our services. Please take a few minutes to complete the written survey that you may receive in the mail after your visit with us. Thank you!             Your Updated Medication List - Protect others around you: Learn how to safely use, store and throw away your medicines at www.disposemymeds.org.        "   This list is accurate as of 3/21/18  9:19 AM.  Always use your most recent med list.                   Brand Name Dispense Instructions for use Diagnosis    ADVIL PO      Take 800 mg by mouth Reported on 5/23/2017        citalopram 40 MG tablet    celeXA    90 tablet    Take 1 tablet (40 mg) by mouth daily INDICATION: TO TREAT ANXIETY    Anxiety       cyanocobalamin 1000 MCG/ML injection    VITAMIN B12    30 mL    Inject 1 mL (1,000 mcg) Subcutaneous every 14 days INDICATION: FOR VITAMIN B12 SUPPLEMENTATION    Vitamin B12 deficiency without anemia       ferrous sulfate 140 (45 FE) MG Tbcr CR tablet     90 tablet    TAKE ONE TABLET BY MOUTH EVERY DAY. PLEASE TAKE WITH 4OZ OF ORANGE JUICE WITH PULP    Iron deficiency       ondansetron 8 MG tablet    ZOFRAN    15 tablet    Take 1 tablet (8 mg) by mouth every 8 hours as needed for nausea    Nausea       vitamin D3 98294 UNITS capsule    CHOLECALCIFEROL    40 capsule    Take 1 capsule (50,000 Units) by mouth every 30 days NDICATION: TO TREAT VITAMIN D DEFICIENCY    Vitamin D deficiency

## 2018-03-21 NOTE — LETTER
3/21/2018    Krystina Frias MD  [address] [facility] [address]    RE: Olivia Flores       Dear Colleague,    I had the pleasure of seeing Olivia Flores in the [facility].    HPI and Plan:   See dictation    Orders Placed This Encounter   Procedures     EKG 12-lead complete w/read - Clinics (performed today)       No orders of the defined types were placed in this encounter.      Encounter Diagnosis   Name Primary?     SOB (shortness of breath) Yes       CURRENT MEDICATIONS:  Current Outpatient Prescriptions   Medication Sig Dispense Refill     ferrous sulfate 140 (45 FE) MG TBCR CR tablet TAKE ONE TABLET BY MOUTH EVERY DAY. PLEASE TAKE WITH 4OZ OF ORANGE JUICE WITH PULP 90 tablet 3     citalopram (CELEXA) 40 MG tablet Take 1 tablet (40 mg) by mouth daily INDICATION: TO TREAT ANXIETY 90 tablet 3     ondansetron (ZOFRAN) 8 MG tablet Take 1 tablet (8 mg) by mouth every 8 hours as needed for nausea 15 tablet 3     Ibuprofen (ADVIL PO) Take 800 mg by mouth Reported on 5/23/2017       vitamin D3 (CHOLECALCIFEROL) 78315 UNITS capsule Take 1 capsule (50,000 Units) by mouth every 30 days NDICATION: TO TREAT VITAMIN D DEFICIENCY 40 capsule 0     cyanocobalamin (VITAMIN B12) 1000 MCG/ML injection Inject 1 mL (1,000 mcg) Subcutaneous every 14 days INDICATION: FOR VITAMIN B12 SUPPLEMENTATION (Patient not taking: Reported on 3/21/2018) 30 mL 0       ALLERGIES     Allergies   Allergen Reactions     Imitrex [Sumatriptan]      Neck pain, dizziness, nausea       PAST MEDICAL HISTORY:  Past Medical History:   Diagnosis Date     BAILEY (generalized anxiety disorder)      Migraine with aura      Pelvic pain in female     due right ovarian cysts       PAST SURGICAL HISTORY:  Past Surgical History:   Procedure Laterality Date     LAPAROSCOPIC SALPINGO-OOPHORECTOMY Bilateral 3/15/2017    Procedure: LAPAROSCOPIC SALPINGO-OOPHORECTOMY;  Surgeon: Rashmi Bella MD;  Location:  OR     NO  HISTORY OF SURGERY         FAMILY HISTORY:  Family History   Problem Relation Age of Onset     Hyperlipidemia Mother      Melanoma Father       from metastatic fibrosarcoma (?progression from Melanoma)     Breast Cancer Maternal Grandmother      post menopausal     Myocardial Infarction Maternal Grandmother      later in life     Breast Cancer Paternal Grandmother      post-menopausal     Blood Disease Paternal Grandmother      ? PERNICIOUS ANEMIA, GETS MONTHLY B12 SHOTS     Hypertension Paternal Grandmother      Type 2 Diabetes Maternal Grandfather      Myocardial Infarction Paternal Grandfather      later in life     Hyperlipidemia Sister      Hyperlipidemia Maternal Aunt      Uterine Cancer Maternal Aunt      Hyperlipidemia Maternal Aunt      Other - See Comments Other      two paternal cousins with endometriosis     Uterine Cancer Paternal Aunt      CEREBROVASCULAR DISEASE No family hx of      Colon Cancer No family hx of        SOCIAL HISTORY:  Social History     Social History     Marital status:      Spouse name: Easton     Number of children: 1     Years of education: N/A     Occupational History      [employer]     Social History Main Topics     Smoking status: Never Smoker     Smokeless tobacco: Never Used     Alcohol use Yes      Comment: social     Drug use: No     Sexual activity: Yes     Partners: Male     Other Topics Concern     Blood Transfusions No     Caffeine Concern No     Sleep Concern Yes     Special Diet No     Exercise Yes     run, bike      Seat Belt Yes     Parent/Sibling W/ Cabg, Mi Or Angioplasty Before 65f 55m? No     Social History Narrative    .    2 daughters (5 and 7 as of 2017) - 1 special needs.    Runs, swims, or bikes - 4-5 days/week.        Review of Systems:  Skin:  Negative     Eyes:  Positive for contacts  ENT:  Negative    Respiratory:  Positive for shortness of breath (i severe epiisode with chest pain)  Cardiovascular:    chest  "pain;Positive for;fatigue (i severe episode with SOB and clamminess)  Gastroenterology: Negative    Genitourinary:  Negative    Musculoskeletal:  Negative    Neurologic:  Positive for migraine headaches  Psychiatric:  Positive for anxiety  Heme/Lymph/Imm:  Negative    Endocrine:  Negative      Physical Exam:  Vitals: /72  Pulse (!) 48  Ht 1.575 m (5' 2\")  Wt 55.8 kg (123 lb)  BMI 22.5 kg/m2    Constitutional:  cooperative, alert and oriented, well developed, well nourished, in no acute distress        Skin:  warm and dry to the touch, no apparent skin lesions or masses noted          Head:  normocephalic, no masses or lesions        Eyes:  pupils equal and round, conjunctivae and lids unremarkable, sclera white, no xanthalasma, EOMS intact, no nystagmus        Lymph:No Cervical lymphadenopathy present     ENT:  no pallor or cyanosis, dentition good        Neck:  carotid pulses are full and equal bilaterally, JVP normal, no carotid bruit        Respiratory:  normal breath sounds, clear to auscultation, normal A-P diameter, normal symmetry, normal respiratory excursion, no use of accessory muscles         Cardiac: regular rhythm, normal S1/S2, no S3 or S4, apical impulse not displaced, no murmurs, gallops or rubs                pulses full and equal, no bruits auscultated                                        GI:  abdomen soft, non-tender, BS normoactive, no mass, no HSM, no bruits        Extremities and Muscular Skeletal:  no deformities, clubbing, cyanosis, erythema observed;no edema              Neurological:  no gross motor deficits        Psych:  Alert and Oriented x 3        Recent Lab Results:  LIPID RESULTS:  Lab Results   Component Value Date    CHOL 176 06/12/2015    HDL 78 06/12/2015    LDL 85 06/12/2015    TRIG 64 06/12/2015    CHOLHDLRATIO 2.3 06/12/2015       LIVER ENZYME RESULTS:  Lab Results   Component Value Date    AST 22 03/09/2017    ALT 26 03/09/2017       CBC RESULTS:  Lab Results "   Component Value Date    WBC 5.8 05/23/2017    RBC 4.33 05/23/2017    HGB 13.5 05/23/2017    HCT 40.6 05/23/2017    MCV 94 05/23/2017    MCH 31.2 05/23/2017    MCHC 33.3 05/23/2017    RDW 12.0 05/23/2017     05/23/2017       BMP RESULTS:  Lab Results   Component Value Date     03/09/2017    POTASSIUM 3.8 03/09/2017    CHLORIDE 102 03/09/2017    CO2 28 03/09/2017    ANIONGAP 7 03/09/2017     (H) 03/09/2017    BUN 19 03/09/2017    CR 0.64 03/09/2017    GFRESTIMATED >90  Non  GFR Calc   03/09/2017    GFRESTBLACK >90   GFR Calc   03/09/2017    GRACIELA 9.7 03/09/2017        A1C RESULTS:  Lab Results   Component Value Date    A1C 5.4 03/14/2006       INR RESULTS:  No results found for: INR        CC  Renny Amaya MD  [place] [address]                    Thank you for allowing me to participate in the care of your patient.      Sincerely,     DR ALPESH JAMESON MD     [facility]    cc:   Renny Amaya MD  [place] [address]

## 2018-03-21 NOTE — PROGRESS NOTES
Service Date: 2018      HISTORY OF PRESENT ILLNESS:  It is a pleasure for me to see this very pleasant young lady who works at [employer] as a .  She is here for evaluation of chest discomfort.      This is a 42-year-old lady who has no cardiac risk factors such as tobacco use, diabetes, hypertension or hyperlipidemia.  She does not abuse drugs or drink excessive amounts of alcohol.  She told me about her family history.  Her dad  at a young age from brain cancer.  Her mother is alive and well and siblings do not have premature atherosclerotic disease.      This lady used to run marathons.  She exercises on a regular basis, doing aerobics.  She does not have exertional chest discomfort or unusual shortness of breath.  She has no dizzy spells or syncopal episodes.  She was evaluated in our clinic almost 2 years ago by my colleague, Dr. Rickie Connolly, for sinus bradycardia and fatigue.  It was felt by Rickie that her sinus bradycardia with physiologic and I would certainly concur.      A few weeks ago, she had isolated episode of chest discomfort which woke her up from sleep.  It was substernal and epigastric and was associated with shortness of breath and diaphoresis.  It lasted several minutes and has not recurred.  She does get occasional stabbing chest pains, especially in the left side which lasts for a second or so.      Cardiac examination is unremarkable.        She does admit to being under a lot of stress.  She has a special needs daughter.  Her 's company has just been acquired and he is unsure of his future role in the new [place].  This lady herself does have a history of anxiety for which she takes Celexa.      EKG today shows sinus bradycardia and is otherwise normal.      In summary, this is a lady with no cardiac risk factors at all.  Her chest discomfort is atypical for heart disease and has not recurred.  I feel certain that this is either due to stress in  the form of an anxiety attack or reflux.  As for her seconds worth of chest pains on the left side and this is almost certainly noncardiac and is likely musculoskeletal.      I reassured her.  I also mentioned stress testing which I will request mainly for reassurance purposes.  She does not feel there is a need for that right now.  She is discharged from our clinic but would be happy to see her again in the future if considered necessary.         ALPESH JAMESON MD, FACC             D: 2018   T: 2018   MT: YOGESH      Name:     VAHID ABDI   MRN:      [id number]        Account:      [id number]   :      1975           Service Date: 2018      Document: [id number]

## 2018-03-21 NOTE — LETTER
3/21/2018      Krystian Frias MD  [address] [facility] [address]      RE: Olivia Flores       Dear Colleague,    I had the pleasure of seeing Olivia Flores in the [facility].    Service Date: 2018      HISTORY OF PRESENT ILLNESS:  It is a pleasure for me to see this very pleasant young lady who works at [employer] as a .  She is here for evaluation of chest discomfort.      This is a 42-year-old lady who has no cardiac risk factors such as tobacco use, diabetes, hypertension or hyperlipidemia.  She does not abuse drugs or drink excessive amounts of alcohol.  She told me about her family history.  Her dad  at a young age from brain cancer.  Her mother is alive and well and siblings do not have premature atherosclerotic disease.      This lady used to run marathons.  She exercises on a regular basis, doing aerobics.  She does not have exertional chest discomfort or unusual shortness of breath.  She has no dizzy spells or syncopal episodes.  She was evaluated in our clinic almost 2 years ago by my colleague, Dr. Rickie Connolly, for sinus bradycardia and fatigue.  It was felt by Rickie that her sinus bradycardia with physiologic and I would certainly concur.      A few weeks ago, she had isolated episode of chest discomfort which woke her up from sleep.  It was substernal and epigastric and was associated with shortness of breath and diaphoresis.  It lasted several minutes and has not recurred.  She does get occasional stabbing chest pains, especially in the left side which lasts for a second or so.      Cardiac examination is unremarkable.        She does admit to being under a lot of stress.  She has a special needs daughter.  Her 's company has just been acquired and he is unsure of his future role in the new [place].  This lady herself does have a history of anxiety for which she takes Celexa.      EKG today shows sinus  bradycardia and is otherwise normal.      In summary, this is a lady with no cardiac risk factors at all.  Her chest discomfort is atypical for heart disease and has not recurred.  I feel certain that this is either due to stress in the form of an anxiety attack or reflux.  As for her seconds worth of chest pains on the left side and this is almost certainly noncardiac and is likely musculoskeletal.      I reassured her.  I also mentioned stress testing which I will request mainly for reassurance purposes.  She does not feel there is a need for that right now.  She is discharged from our clinic but would be happy to see her again in the future if considered necessary.         ALPESH JAMESON MD, [facility]             D: 2018   T: 2018   MT: YOGESH      Name:     VAHID ABDI   MRN:      [id number]        Account:      [id number]   :      1975           Service Date: 2018      Document: [id number]         Outpatient Encounter Prescriptions as of 3/21/2018   Medication Sig Dispense Refill     ferrous sulfate 140 (45 FE) MG TBCR CR tablet TAKE ONE TABLET BY MOUTH EVERY DAY. PLEASE TAKE WITH 4OZ OF ORANGE JUICE WITH PULP 90 tablet 3     citalopram (CELEXA) 40 MG tablet Take 1 tablet (40 mg) by mouth daily INDICATION: TO TREAT ANXIETY 90 tablet 3     ondansetron (ZOFRAN) 8 MG tablet Take 1 tablet (8 mg) by mouth every 8 hours as needed for nausea 15 tablet 3     Ibuprofen (ADVIL PO) Take 800 mg by mouth Reported on 2017       [DISCONTINUED] valACYclovir (VALTREX) 1000 mg tablet Take 2 tablets (2,000 mg) by mouth 2 times daily 4 tablet 0     vitamin D3 (CHOLECALCIFEROL) 14371 UNITS capsule Take 1 capsule (50,000 Units) by mouth every 30 days NDICATION: TO TREAT VITAMIN D DEFICIENCY 40 capsule 0     cyanocobalamin (VITAMIN B12) 1000 MCG/ML injection Inject 1 mL (1,000 mcg) Subcutaneous every 14 days INDICATION: FOR VITAMIN B12 SUPPLEMENTATION (Patient not taking: Reported on 3/21/2018) 30 mL 0      [DISCONTINUED] hydrOXYzine (ATARAX) 25 MG tablet Take 2-4 tablets ( mg) by mouth nightly as needed for itching (Patient not taking: Reported on 12/28/2017) 60 tablet 3     No facility-administered encounter medications on file as of 3/21/2018.        Again, thank you for allowing me to participate in the care of your patient.      Sincerely,    DR ALPESH JAMESON MD     [facility]    cc:   [place] [address]

## 2018-03-21 NOTE — PROGRESS NOTES
HPI and Plan:   See dictation    Orders Placed This Encounter   Procedures     EKG 12-lead complete w/read - Clinics (performed today)       No orders of the defined types were placed in this encounter.      Encounter Diagnosis   Name Primary?     SOB (shortness of breath) Yes       CURRENT MEDICATIONS:  Current Outpatient Prescriptions   Medication Sig Dispense Refill     ferrous sulfate 140 (45 FE) MG TBCR CR tablet TAKE ONE TABLET BY MOUTH EVERY DAY. PLEASE TAKE WITH 4OZ OF ORANGE JUICE WITH PULP 90 tablet 3     citalopram (CELEXA) 40 MG tablet Take 1 tablet (40 mg) by mouth daily INDICATION: TO TREAT ANXIETY 90 tablet 3     ondansetron (ZOFRAN) 8 MG tablet Take 1 tablet (8 mg) by mouth every 8 hours as needed for nausea 15 tablet 3     Ibuprofen (ADVIL PO) Take 800 mg by mouth Reported on 2017       vitamin D3 (CHOLECALCIFEROL) 91996 UNITS capsule Take 1 capsule (50,000 Units) by mouth every 30 days NDICATION: TO TREAT VITAMIN D DEFICIENCY 40 capsule 0     cyanocobalamin (VITAMIN B12) 1000 MCG/ML injection Inject 1 mL (1,000 mcg) Subcutaneous every 14 days INDICATION: FOR VITAMIN B12 SUPPLEMENTATION (Patient not taking: Reported on 3/21/2018) 30 mL 0       ALLERGIES     Allergies   Allergen Reactions     Imitrex [Sumatriptan]      Neck pain, dizziness, nausea       PAST MEDICAL HISTORY:  Past Medical History:   Diagnosis Date     BAILEY (generalized anxiety disorder)      Migraine with aura      Pelvic pain in female     due right ovarian cysts       PAST SURGICAL HISTORY:  Past Surgical History:   Procedure Laterality Date     LAPAROSCOPIC SALPINGO-OOPHORECTOMY Bilateral 3/15/2017    Procedure: LAPAROSCOPIC SALPINGO-OOPHORECTOMY;  Surgeon: Rashmi Bella MD;  Location: [facility] OR     NO HISTORY OF SURGERY         FAMILY HISTORY:  Family History   Problem Relation Age of Onset     Hyperlipidemia Mother      Melanoma Father       from metastatic fibrosarcoma (?progression from Melanoma)     Breast Cancer  Maternal Grandmother      post menopausal     Myocardial Infarction Maternal Grandmother      later in life     Breast Cancer Paternal Grandmother      post-menopausal     Blood Disease Paternal Grandmother      ? PERNICIOUS ANEMIA, GETS MONTHLY B12 SHOTS     Hypertension Paternal Grandmother      Type 2 Diabetes Maternal Grandfather      Myocardial Infarction Paternal Grandfather      later in life     Hyperlipidemia Sister      Hyperlipidemia Maternal Aunt      Uterine Cancer Maternal Aunt      Hyperlipidemia Maternal Aunt      Other - See Comments Other      two paternal cousins with endometriosis     Uterine Cancer Paternal Aunt      CEREBROVASCULAR DISEASE No family hx of      Colon Cancer No family hx of        SOCIAL HISTORY:  Social History     Social History     Marital status:      Spouse name: Easton     Number of children: 1     Years of education: N/A     Occupational History      [employer]     Social History Main Topics     Smoking status: Never Smoker     Smokeless tobacco: Never Used     Alcohol use Yes      Comment: social     Drug use: No     Sexual activity: Yes     Partners: Male     Other Topics Concern     Blood Transfusions No     Caffeine Concern No     Sleep Concern Yes     Special Diet No     Exercise Yes     run, bike      Seat Belt Yes     Parent/Sibling W/ Cabg, Mi Or Angioplasty Before 65f 55m? No     Social History Narrative    .    2 daughters (5 and 7 as of 2017) - 1 special needs.    Runs, swims, or bikes - 4-5 days/week.        Review of Systems:  Skin:  Negative     Eyes:  Positive for contacts  ENT:  Negative    Respiratory:  Positive for shortness of breath (i severe epiisode with chest pain)  Cardiovascular:    chest pain;Positive for;fatigue (i severe episode with SOB and clamminess)  Gastroenterology: Negative    Genitourinary:  Negative    Musculoskeletal:  Negative    Neurologic:  Positive for migraine headaches  Psychiatric:  Positive for  "anxiety  Heme/Lymph/Imm:  Negative    Endocrine:  Negative      Physical Exam:  Vitals: /72  Pulse (!) 48  Ht 1.575 m (5' 2\")  Wt 55.8 kg (123 lb)  BMI 22.5 kg/m2    Constitutional:  cooperative, alert and oriented, well developed, well nourished, in no acute distress        Skin:  warm and dry to the touch, no apparent skin lesions or masses noted          Head:  normocephalic, no masses or lesions        Eyes:  pupils equal and round, conjunctivae and lids unremarkable, sclera white, no xanthalasma, EOMS intact, no nystagmus        Lymph:No Cervical lymphadenopathy present     ENT:  no pallor or cyanosis, dentition good        Neck:  carotid pulses are full and equal bilaterally, JVP normal, no carotid bruit        Respiratory:  normal breath sounds, clear to auscultation, normal A-P diameter, normal symmetry, normal respiratory excursion, no use of accessory muscles         Cardiac: regular rhythm, normal S1/S2, no S3 or S4, apical impulse not displaced, no murmurs, gallops or rubs                pulses full and equal, no bruits auscultated                                        GI:  abdomen soft, non-tender, BS normoactive, no mass, no HSM, no bruits        Extremities and Muscular Skeletal:  no deformities, clubbing, cyanosis, erythema observed;no edema              Neurological:  no gross motor deficits        Psych:  Alert and Oriented x 3        Recent Lab Results:  LIPID RESULTS:  Lab Results   Component Value Date    CHOL 176 06/12/2015    HDL 78 06/12/2015    LDL 85 06/12/2015    TRIG 64 06/12/2015    CHOLHDLRATIO 2.3 06/12/2015       LIVER ENZYME RESULTS:  Lab Results   Component Value Date    AST 22 03/09/2017    ALT 26 03/09/2017       CBC RESULTS:  Lab Results   Component Value Date    WBC 5.8 05/23/2017    RBC 4.33 05/23/2017    HGB 13.5 05/23/2017    HCT 40.6 05/23/2017    MCV 94 05/23/2017    MCH 31.2 05/23/2017    MCHC 33.3 05/23/2017    RDW 12.0 05/23/2017     05/23/2017 "       BMP RESULTS:  Lab Results   Component Value Date     03/09/2017    POTASSIUM 3.8 03/09/2017    CHLORIDE 102 03/09/2017    CO2 28 03/09/2017    ANIONGAP 7 03/09/2017     (H) 03/09/2017    BUN 19 03/09/2017    CR 0.64 03/09/2017    GFRESTIMATED >90  Non  GFR Calc   03/09/2017    GFRESTBLACK >90   GFR Calc   03/09/2017    GRACIELA 9.7 03/09/2017        A1C RESULTS:  Lab Results   Component Value Date    A1C 5.4 03/14/2006       INR RESULTS:  No results found for: INR        CC  Renny Amaya MD  [place] [address]

## 2018-03-28 ENCOUNTER — THERAPY VISIT (OUTPATIENT)
Dept: CHIROPRACTIC MEDICINE | Facility: CLINIC | Age: 43
End: 2018-03-28
Payer: COMMERCIAL

## 2018-03-28 DIAGNOSIS — M54.2 CERVICALGIA: Primary | ICD-10-CM

## 2018-03-28 DIAGNOSIS — M99.01 CERVICAL SEGMENT DYSFUNCTION: ICD-10-CM

## 2018-03-28 DIAGNOSIS — G44.201 INTRACTABLE TENSION-TYPE HEADACHE, UNSPECIFIED CHRONICITY PATTERN: ICD-10-CM

## 2018-03-28 DIAGNOSIS — M99.00 SEGMENTAL AND SOMATIC DYSFUNCTION OF HEAD REGION: ICD-10-CM

## 2018-03-28 DIAGNOSIS — G43.119 INTRACTABLE MIGRAINE WITH AURA WITHOUT STATUS MIGRAINOSUS: ICD-10-CM

## 2018-03-28 DIAGNOSIS — M99.02 THORACIC SEGMENT DYSFUNCTION: ICD-10-CM

## 2018-03-28 PROCEDURE — 97810 ACUP 1/> WO ESTIM 1ST 15 MIN: CPT | Mod: GA | Performed by: CHIROPRACTOR

## 2018-03-28 PROCEDURE — 98940 CHIROPRACT MANJ 1-2 REGIONS: CPT | Mod: AT | Performed by: CHIROPRACTOR

## 2018-03-28 NOTE — PROGRESS NOTES
Visit #:  5/2018      Subjective:  Olivia Flores is a 40 year old female who is seen in f/u up for:        Cervicalgia  Cervical segment dysfunction  Thoracic segment dysfunction  Intractable tension-type headache, unspecified chronicity pattern  Segmental and somatic dysfunction of head region  Intractable migraine with aura without status migrainosus.     Since last visit,   Olivia Flores reports:    Area of chief complaint:  Cervical and Thoracic :  Symptoms are graded at 3-4/10. Today. The pt reports one migraine last week for 2 days. She did not take medication. The pt states recent stress may be an issue. She reports more tenderness on the L side of the neck in the upper cervicals.  She denies weakness in the extremities or other unusual sx.     Since last visit the patient feels that they are worse due to a migraine.    Objective:  The following was observed:    P: pain elicited on palpation: OCC R, C2, C3 right, T5, T6,   A: static palpation demonstrates intersegmental asymmetry OCC, R, C2, C3 right, T5, T6,    R: motion palpation notes restricted motion  T: hypertonicity at: Sub-occipital and T-spine paraspinal Bilaterally    Segmental spinal dysfunction/restrictions found at:  OCC R, C2, C3 right, T5, T6      Assessment:    Diagnoses:      1. Cervicalgia    2. Cervical segment dysfunction    3. Thoracic segment dysfunction    4. Intractable tension-type headache, unspecified chronicity pattern    5. Segmental and somatic dysfunction of head region    6. Intractable migraine with aura without status migrainosus        Patient's condition:exacerbation/regression but stable overall     Treatment effectiveness:  No increase in sx pt stable   Pt responding well to treatment      Procedures:  CMT:   17670 Chiropractic manipulative treatment 1-2  regions performed   Cervical:  Activator C2, C3 L   Thoracic: T1/T2, T7, T8 prone diversified and activator      Modalities:  32766: Acupuncture:  Points:  Lulu Points in cervical spine jaycob points of the cervical and thoracic musculature 20 minutes prone    Therapeutic procedures:  1st rib stretching R and Left 3 minutes     Response to Treatment  Reduction of symptoms no increase in sx, pt stable    Prognosis: Guarded    Progress towards Goals: Patient is making progress towards the goal.The pt would like to run or bike without pain in the neck and upper back-the pt has reached this goal  Duration to achieve goal will be 4-8 weeks at a frequency of 1 per week       Recommendations:    Instructions:drink plenty of fluids    Follow-up:  Return to care in 3-4 weeks with  reduction of care

## 2018-04-18 ENCOUNTER — THERAPY VISIT (OUTPATIENT)
Dept: CHIROPRACTIC MEDICINE | Facility: CLINIC | Age: 43
End: 2018-04-18
Payer: COMMERCIAL

## 2018-04-18 DIAGNOSIS — M99.02 THORACIC SEGMENT DYSFUNCTION: ICD-10-CM

## 2018-04-18 DIAGNOSIS — M99.00 SEGMENTAL AND SOMATIC DYSFUNCTION OF HEAD REGION: ICD-10-CM

## 2018-04-18 DIAGNOSIS — G44.201 INTRACTABLE TENSION-TYPE HEADACHE, UNSPECIFIED CHRONICITY PATTERN: ICD-10-CM

## 2018-04-18 DIAGNOSIS — M54.2 CERVICALGIA: Primary | ICD-10-CM

## 2018-04-18 DIAGNOSIS — G43.119 INTRACTABLE MIGRAINE WITH AURA WITHOUT STATUS MIGRAINOSUS: ICD-10-CM

## 2018-04-18 DIAGNOSIS — M99.01 CERVICAL SEGMENT DYSFUNCTION: ICD-10-CM

## 2018-04-18 PROCEDURE — 97810 ACUP 1/> WO ESTIM 1ST 15 MIN: CPT | Mod: GA | Performed by: CHIROPRACTOR

## 2018-04-18 PROCEDURE — 98940 CHIROPRACT MANJ 1-2 REGIONS: CPT | Mod: AT | Performed by: CHIROPRACTOR

## 2018-04-18 NOTE — PROGRESS NOTES
Visit #:  62/018      Subjective:  Olivia Flores is a 40 year old female who is seen in f/u up for:        Cervicalgia  Cervical segment dysfunction  Thoracic segment dysfunction  Intractable tension-type headache, unspecified chronicity pattern  Segmental and somatic dysfunction of head region  Intractable migraine with aura without status migrainosus.     Since last visit,   Olivia Flores reports:    Area of chief complaint:  Cervical and Thoracic :  Symptoms are graded at 3-4/10. Today. The pt reports 4 HA in 3 weeks. She took medication for one severe on this weekend. The pt cannot recall a specific incident that could have started the issue other than exposure to stress. She denies weakness in the extremities or other unusual sx.     Since last visit the patient feels that they are worse due to an increase in migraine sx.     Objective:  The following was observed:    P: pain elicited on palpation: OCC R, C2, C3 right, T5, T6,   A: static palpation demonstrates intersegmental asymmetry OCC, R, C2, C3 right, T5, T6,    R: motion palpation notes restricted motion  T: hypertonicity at: Sub-occipital and T-spine paraspinal Bilaterally    Segmental spinal dysfunction/restrictions found at:  OCC R, C2, C3 right, T5, T6      Assessment:    Diagnoses:      1. Cervicalgia    2. Cervical segment dysfunction    3. Thoracic segment dysfunction    4. Intractable tension-type headache, unspecified chronicity pattern    5. Segmental and somatic dysfunction of head region    6. Intractable migraine with aura without status migrainosus        Patient's condition:exacerbation/regression but stable overall     Treatment effectiveness:  No increase in sx pt stable   Pt responding well to treatment      Procedures:  CMT:   35900 Chiropractic manipulative treatment 1-2  regions performed   Cervical:  Activator C2, C3 L   Thoracic: T1/T2, T7, T8 prone diversified and activator      Modalities:  47178: Acupuncture:  Points:  Lulu Points in cervical spine jaycob points of the cervical and thoracic musculature 20 minutes prone    Therapeutic procedures:  1st rib stretching R and Left 3 minutes     Response to Treatment  Reduction of symptoms no increase in sx, pt stable    Prognosis: Guarded    Progress towards Goals: Patient is making progress towards the goal.The pt would like to run or bike without pain in the neck and upper back-the pt has reached this goal  Duration to achieve goal will be 4-8 weeks at a frequency of 1 per week       Recommendations:    Instructions:drink plenty of fluids    Follow-up:  Return to care in 2 weeks for stability

## 2018-05-16 ENCOUNTER — THERAPY VISIT (OUTPATIENT)
Dept: CHIROPRACTIC MEDICINE | Facility: CLINIC | Age: 43
End: 2018-05-16
Payer: COMMERCIAL

## 2018-05-16 DIAGNOSIS — M99.02 THORACIC SEGMENT DYSFUNCTION: ICD-10-CM

## 2018-05-16 DIAGNOSIS — G43.119 INTRACTABLE MIGRAINE WITH AURA WITHOUT STATUS MIGRAINOSUS: ICD-10-CM

## 2018-05-16 DIAGNOSIS — M54.2 CERVICALGIA: Primary | ICD-10-CM

## 2018-05-16 DIAGNOSIS — M99.00 SEGMENTAL AND SOMATIC DYSFUNCTION OF HEAD REGION: ICD-10-CM

## 2018-05-16 DIAGNOSIS — G44.201 INTRACTABLE TENSION-TYPE HEADACHE, UNSPECIFIED CHRONICITY PATTERN: ICD-10-CM

## 2018-05-16 DIAGNOSIS — M99.01 CERVICAL SEGMENT DYSFUNCTION: ICD-10-CM

## 2018-05-16 PROCEDURE — 98940 CHIROPRACT MANJ 1-2 REGIONS: CPT | Mod: AT | Performed by: CHIROPRACTOR

## 2018-05-16 PROCEDURE — 97810 ACUP 1/> WO ESTIM 1ST 15 MIN: CPT | Mod: GA | Performed by: CHIROPRACTOR

## 2018-05-16 NOTE — PROGRESS NOTES
Visit #:  7/018      Subjective:  Olivia Flores is a 40 year old female who is seen in f/u up for:        Cervicalgia  Cervical segment dysfunction  Thoracic segment dysfunction  Intractable tension-type headache, unspecified chronicity pattern  Segmental and somatic dysfunction of head region  Intractable migraine with aura without status migrainosus.     Since last visit,   Olivia Flores reports:    Area of chief complaint:  Cervical and Thoracic :  Symptoms are graded at 5/10. Today. The pt reports a severe migraine in 4 weeks. She missed her  2 week appointment and gradually noted an increase in HA sx so severe within 2-3 days she was forced to go to the hospital. The migraine subsided however she continued to notice a mild ache in the neck area. The HA sx resolved. Today she reports mild sx graded a 2/10 on VAS. She states the px was related to her cycle and hormonal changes. The pt denies weakness or other unusual sx.     Since last visit the patient feels that they are worse due to an increase in migraines this past 4 weeks     Objective:  The following was observed:    P: pain elicited on palpation: OCC R, C2, C3 right, T5, T6,   A: static palpation demonstrates intersegmental asymmetry OCC, R, C2, C3 right, T5, T6,    R: motion palpation notes restricted motion  T: hypertonicity at: Sub-occipital and T-spine paraspinal Bilaterally    Segmental spinal dysfunction/restrictions found at:  OCC R, C2, C3 right, T5, T6      Assessment:    Diagnoses:      1. Cervicalgia    2. Cervical segment dysfunction    3. Thoracic segment dysfunction    4. Intractable tension-type headache, unspecified chronicity pattern    5. Segmental and somatic dysfunction of head region    6. Intractable migraine with aura without status migrainosus        Patient's condition:exacerbation/regression      Treatment effectiveness:  No increase in sx pt stable   Pt responding well to treatment      Procedures:  CMT:   72657  Chiropractic manipulative treatment 1-2  regions performed   Cervical:  Activator C2, C3 L   Thoracic: T1/T2, T7, T8 prone diversified and activator  Occiput: L OCC Activator     Modalities:  [id number]: Acupuncture:  Points: Lulu Points in cervical spine jaycob points of the cervical and thoracic musculature 20 minutes prone    Therapeutic procedures:  1st rib stretching R and Left 3 minutes     Response to Treatment  Reduction of symptoms no increase in sx, pt stable    Prognosis: Guarded    Progress towards Goals: Patient is making progress towards the goal.The pt would like to run or bike without pain in the neck and upper back-the pt has reached this goal  Duration to achieve goal will be 4-8 weeks at a frequency of 1 per week       Recommendations:    Instructions:drink plenty of fluids    Follow-up:  Return to care in 2 weeks for stability due to experiencing migraine

## 2018-05-30 ENCOUNTER — THERAPY VISIT (OUTPATIENT)
Dept: CHIROPRACTIC MEDICINE | Facility: CLINIC | Age: 43
End: 2018-05-30
Payer: COMMERCIAL

## 2018-05-30 DIAGNOSIS — M99.00 SEGMENTAL AND SOMATIC DYSFUNCTION OF HEAD REGION: ICD-10-CM

## 2018-05-30 DIAGNOSIS — M99.01 CERVICAL SEGMENT DYSFUNCTION: ICD-10-CM

## 2018-05-30 DIAGNOSIS — G44.201 INTRACTABLE TENSION-TYPE HEADACHE, UNSPECIFIED CHRONICITY PATTERN: ICD-10-CM

## 2018-05-30 DIAGNOSIS — G43.119 INTRACTABLE MIGRAINE WITH AURA WITHOUT STATUS MIGRAINOSUS: ICD-10-CM

## 2018-05-30 DIAGNOSIS — R30.0 DYSURIA: ICD-10-CM

## 2018-05-30 DIAGNOSIS — M54.2 CERVICALGIA: Primary | ICD-10-CM

## 2018-05-30 DIAGNOSIS — M99.02 THORACIC SEGMENT DYSFUNCTION: ICD-10-CM

## 2018-05-30 DIAGNOSIS — N39.0 ACUTE UTI: Primary | ICD-10-CM

## 2018-05-30 DIAGNOSIS — R82.90 NONSPECIFIC FINDING ON EXAMINATION OF URINE: ICD-10-CM

## 2018-05-30 LAB
ALBUMIN UR-MCNC: NEGATIVE MG/DL
APPEARANCE UR: CLEAR
BACTERIA #/AREA URNS HPF: ABNORMAL /HPF
BILIRUB UR QL STRIP: NEGATIVE
COLOR UR AUTO: YELLOW
GLUCOSE UR STRIP-MCNC: NEGATIVE MG/DL
HGB UR QL STRIP: ABNORMAL
KETONES UR STRIP-MCNC: NEGATIVE MG/DL
LEUKOCYTE ESTERASE UR QL STRIP: ABNORMAL
NITRATE UR QL: NEGATIVE
PH UR STRIP: 7 PH (ref 5–7)
RBC #/AREA URNS AUTO: ABNORMAL /HPF
SOURCE: ABNORMAL
SP GR UR STRIP: 1.01 (ref 1–1.03)
UROBILINOGEN UR STRIP-ACNC: 0.2 EU/DL (ref 0.2–1)
WBC #/AREA URNS AUTO: ABNORMAL /HPF

## 2018-05-30 PROCEDURE — 81001 URINALYSIS AUTO W/SCOPE: CPT | Performed by: PHYSICIAN ASSISTANT

## 2018-05-30 PROCEDURE — 97810 ACUP 1/> WO ESTIM 1ST 15 MIN: CPT | Mod: GA | Performed by: CHIROPRACTOR

## 2018-05-30 PROCEDURE — 87088 URINE BACTERIA CULTURE: CPT | Performed by: PHYSICIAN ASSISTANT

## 2018-05-30 PROCEDURE — 87086 URINE CULTURE/COLONY COUNT: CPT | Performed by: PHYSICIAN ASSISTANT

## 2018-05-30 PROCEDURE — 98940 CHIROPRACT MANJ 1-2 REGIONS: CPT | Mod: AT | Performed by: CHIROPRACTOR

## 2018-05-30 PROCEDURE — 87186 SC STD MICRODIL/AGAR DIL: CPT | Performed by: PHYSICIAN ASSISTANT

## 2018-05-30 RX ORDER — NITROFURANTOIN 25; 75 MG/1; MG/1
100 CAPSULE ORAL 2 TIMES DAILY
Qty: 14 CAPSULE | Refills: 0 | Status: SHIPPED | OUTPATIENT
Start: 2018-05-30 | End: 2018-08-16

## 2018-05-30 NOTE — PROGRESS NOTES
Visit #:  8/2018      Subjective:  Olivia Flores is a 40 year old female who is seen in f/u up for:        Cervicalgia  Cervical segment dysfunction  Thoracic segment dysfunction  Intractable tension-type headache, unspecified chronicity pattern  Segmental and somatic dysfunction of head region  Intractable migraine with aura without status migrainosus.     Since last visit,   Olivia Flores reports:    Area of chief complaint:  Cervical and Thoracic :  Symptoms are graded at 2/10. Today. The pt reports no HA sx or migraines for the past 2 weeks. She reports soreness in the upper neck area on the L side. She will be having her menstrual cycle soon and is more sensitive to hormonal changes and notes increases in HA sx. She denies weakness or other unusual sx.     Since last visit the patient feels that they have improved.     Objective:  The following was observed:    P: pain elicited on palpation: OCC R, C2, C3 right, T5, T6,   A: static palpation demonstrates intersegmental asymmetry OCC, R, C2, C3 right, T5, T6,    R: motion palpation notes restricted motion  T: hypertonicity at: Sub-occipital and T-spine paraspinal Bilaterally    Segmental spinal dysfunction/restrictions found at:  OCC R, C2, C3 right, T5, T6      Assessment:    Diagnoses:      1. Cervicalgia    2. Cervical segment dysfunction    3. Thoracic segment dysfunction    4. Intractable tension-type headache, unspecified chronicity pattern    5. Segmental and somatic dysfunction of head region    6. Intractable migraine with aura without status migrainosus        Patient's condition:exacerbation/regression      Treatment effectiveness:  No increase in sx pt stable   Pt responding well to treatment      Procedures:  CMT:   97601 Chiropractic manipulative treatment 1-2  regions performed   Cervical:  Activator C2, C3 L   Thoracic: T1/T2, T7, T8 prone diversified and activator  Occiput: L OCC Activator     Modalities:  [id number]: Acupuncture:  Points:  Lulu Points in cervical spine jaycob points of the cervical and thoracic musculature 20 minutes prone    Therapeutic procedures:  1st rib stretching R and Left 3 minutes     Response to Treatment  Reduction of symptoms no increase in sx, pt stable    Prognosis: Guarded    Progress towards Goals: Patient is making progress towards the goal.The pt would like to run or bike without pain in the neck and upper back-the pt has reached this goal  Duration to achieve goal will be 4-8 weeks at a frequency of 1 per week       Recommendations:    Instructions:drink plenty of fluids    Follow-up:  Return to care in 2 weeks for stability due to experiencing migraine

## 2018-06-02 LAB
BACTERIA SPEC CULT: ABNORMAL
SPECIMEN SOURCE: ABNORMAL

## 2018-06-13 ENCOUNTER — THERAPY VISIT (OUTPATIENT)
Dept: CHIROPRACTIC MEDICINE | Facility: CLINIC | Age: 43
End: 2018-06-13
Payer: COMMERCIAL

## 2018-06-13 DIAGNOSIS — M99.00 SEGMENTAL AND SOMATIC DYSFUNCTION OF HEAD REGION: ICD-10-CM

## 2018-06-13 DIAGNOSIS — G43.119 INTRACTABLE MIGRAINE WITH AURA WITHOUT STATUS MIGRAINOSUS: ICD-10-CM

## 2018-06-13 DIAGNOSIS — M54.2 CERVICALGIA: Primary | ICD-10-CM

## 2018-06-13 DIAGNOSIS — G44.201 INTRACTABLE TENSION-TYPE HEADACHE, UNSPECIFIED CHRONICITY PATTERN: ICD-10-CM

## 2018-06-13 DIAGNOSIS — M99.01 CERVICAL SEGMENT DYSFUNCTION: ICD-10-CM

## 2018-06-13 DIAGNOSIS — M99.02 THORACIC SEGMENT DYSFUNCTION: ICD-10-CM

## 2018-06-13 PROCEDURE — 97810 ACUP 1/> WO ESTIM 1ST 15 MIN: CPT | Mod: GA | Performed by: CHIROPRACTOR

## 2018-06-13 PROCEDURE — 98940 CHIROPRACT MANJ 1-2 REGIONS: CPT | Mod: AT | Performed by: CHIROPRACTOR

## 2018-06-14 NOTE — PROGRESS NOTES
Visit #:  9/2018      Subjective:  Olivia Flores is a 40 year old female who is seen in f/u up for:        Cervicalgia  Cervical segment dysfunction  Thoracic segment dysfunction  Intractable tension-type headache, unspecified chronicity pattern  Segmental and somatic dysfunction of head region  Intractable migraine with aura without status migrainosus.     Since last visit,   Olivia Flores reports:    Area of chief complaint:  Cervical and Thoracic :  Symptoms are graded at 2/10. Today. The pt reports one HA in additional 2 weeks. She did not need to take medication for the pain. She is pleased with her progress.     Since last visit the patient feels that they have improved-one HA in one month    Objective:  The following was observed:    P: pain elicited on palpation: OCC R, C2, C3 right, T5, T6,   A: static palpation demonstrates intersegmental asymmetry OCC, R, C2, C3 right, T5, T6,    R: motion palpation notes restricted motion  T: hypertonicity at: Sub-occipital and T-spine paraspinal Bilaterally    Segmental spinal dysfunction/restrictions found at:  OCC R, C2, C3 right, T5, T6      Assessment:    Diagnoses:      1. Cervicalgia    2. Cervical segment dysfunction    3. Thoracic segment dysfunction    4. Intractable tension-type headache, unspecified chronicity pattern    5. Segmental and somatic dysfunction of head region    6. Intractable migraine with aura without status migrainosus        Patient's condition: pt stable/1 migraine in 2 weeks     Treatment effectiveness:  No increase in sx pt stable   Pt responding well to treatment      Procedures:  CMT:   86319 Chiropractic manipulative treatment 1-2  regions performed   Cervical:  Activator C2, C3 L   Thoracic: T1/T2, T7, T8 prone diversified and activator  Occiput: L OCC Activator     Modalities:  [id number]: Acupuncture:  Points: Lulu Points in cervical spine jaycob points of the cervical and thoracic musculature 20 minutes prone    Therapeutic  procedures:  1st rib stretching R and Left 3 minutes     Response to Treatment  Reduction of symptoms no increase in sx, pt stable    Prognosis: Guarded    Progress towards Goals: Patient is making progress towards the goal.The pt would like to run or bike without pain in the neck and upper back-the pt has reached this goal  Duration to achieve goal will be 4-8 weeks at a frequency of 1 per week       Recommendations:    Instructions:drink plenty of fluids    Follow-up:  Return to care in 2 weeks for stability

## 2018-06-27 ENCOUNTER — THERAPY VISIT (OUTPATIENT)
Dept: CHIROPRACTIC MEDICINE | Facility: CLINIC | Age: 43
End: 2018-06-27
Payer: COMMERCIAL

## 2018-06-27 DIAGNOSIS — M99.00 SEGMENTAL AND SOMATIC DYSFUNCTION OF HEAD REGION: ICD-10-CM

## 2018-06-27 DIAGNOSIS — M99.01 CERVICAL SEGMENT DYSFUNCTION: ICD-10-CM

## 2018-06-27 DIAGNOSIS — M99.02 THORACIC SEGMENT DYSFUNCTION: ICD-10-CM

## 2018-06-27 DIAGNOSIS — G43.119 INTRACTABLE MIGRAINE WITH AURA WITHOUT STATUS MIGRAINOSUS: ICD-10-CM

## 2018-06-27 DIAGNOSIS — G44.201 INTRACTABLE TENSION-TYPE HEADACHE, UNSPECIFIED CHRONICITY PATTERN: ICD-10-CM

## 2018-06-27 DIAGNOSIS — M54.2 CERVICALGIA: Primary | ICD-10-CM

## 2018-06-27 DIAGNOSIS — J02.0 STREP THROAT: ICD-10-CM

## 2018-06-27 DIAGNOSIS — R07.0 THROAT PAIN: Primary | ICD-10-CM

## 2018-06-27 LAB
DEPRECATED S PYO AG THROAT QL EIA: ABNORMAL
SPECIMEN SOURCE: ABNORMAL

## 2018-06-27 PROCEDURE — 87880 STREP A ASSAY W/OPTIC: CPT | Performed by: FAMILY MEDICINE

## 2018-06-27 PROCEDURE — 97810 ACUP 1/> WO ESTIM 1ST 15 MIN: CPT | Mod: GA | Performed by: CHIROPRACTOR

## 2018-06-27 PROCEDURE — 98940 CHIROPRACT MANJ 1-2 REGIONS: CPT | Mod: AT | Performed by: CHIROPRACTOR

## 2018-06-27 RX ORDER — AMOXICILLIN 500 MG/1
500 CAPSULE ORAL 2 TIMES DAILY
Qty: 20 CAPSULE | Refills: 0 | Status: SHIPPED | OUTPATIENT
Start: 2018-06-27 | End: 2018-07-07

## 2018-06-27 NOTE — PROGRESS NOTES
Visit #:  10/2018      Subjective:  Olivia Flores is a 40 year old female who is seen in f/u up for:        Cervicalgia  Cervical segment dysfunction  Thoracic segment dysfunction  Intractable tension-type headache, unspecified chronicity pattern  Segmental and somatic dysfunction of head region  Intractable migraine with aura without status migrainosus.     Since last visit,   Olivia Flores reports:    Area of chief complaint:  Cervical and Thoracic :  Symptoms are graded at 3/10. Today. The pt reports no HA or migraine since the previous treatment. She was prescribed Topomax for migraine prevention and she dislikes the side effects. She reports tension in the neck area and a sense of heaviness or fogginess in the head. She denies weakness or other unusual sx.       Since last visit the patient feels that they have improved however stable       Objective:  The following was observed:    P: pain elicited on palpation: OCC R, C2, C3 right, T5, T6,   A: static palpation demonstrates intersegmental asymmetry OCC, R, C2, C3 right, T5, T6,    R: motion palpation notes restricted motion  T: hypertonicity at: Sub-occipital and T-spine paraspinal Bilaterally    Segmental spinal dysfunction/restrictions found at:  OCC R, C2, C3 right, T5, T6      Assessment:    Diagnoses:      1. Cervicalgia    2. Cervical segment dysfunction    3. Thoracic segment dysfunction    4. Intractable tension-type headache, unspecified chronicity pattern    5. Segmental and somatic dysfunction of head region    6. Intractable migraine with aura without status migrainosus        Patient's condition: pt stable no increase in sx due to taking medication      Treatment effectiveness:  No increase in sx pt stable   Pt responding well to treatment      Procedures:  CMT:   78757 Chiropractic manipulative treatment 1-2  regions performed   Cervical:  Activator C2, C3 L   Thoracic: T1/T2, T7, T8 prone diversified and activator  Occiput: L OCC  Activator     Modalities:  05653: Acupuncture:  Points: Lulu Points in cervical spine jaycob points of the cervical and thoracic musculature 20 minutes prone    Therapeutic procedures:  1st rib stretching R and Left 3 minutes     Response to Treatment  Reduction of symptoms no increase in sx, pt stable    Prognosis: Guarded    Progress towards Goals: Patient is making progress towards the goal.The pt would like to run or bike without pain in the neck and upper back-the pt has reached this goal  Duration to achieve goal will be 4-8 weeks at a frequency of 1 per week       Recommendations:    Instructions:drink plenty of fluids    Follow-up:  Return to care in 2 weeks for stability

## 2018-07-11 ENCOUNTER — THERAPY VISIT (OUTPATIENT)
Dept: CHIROPRACTIC MEDICINE | Facility: CLINIC | Age: 43
End: 2018-07-11
Payer: COMMERCIAL

## 2018-07-11 DIAGNOSIS — M99.02 THORACIC SEGMENT DYSFUNCTION: ICD-10-CM

## 2018-07-11 DIAGNOSIS — G44.201 INTRACTABLE TENSION-TYPE HEADACHE, UNSPECIFIED CHRONICITY PATTERN: ICD-10-CM

## 2018-07-11 DIAGNOSIS — M99.00 SEGMENTAL AND SOMATIC DYSFUNCTION OF HEAD REGION: ICD-10-CM

## 2018-07-11 DIAGNOSIS — M54.2 CERVICALGIA: Primary | ICD-10-CM

## 2018-07-11 DIAGNOSIS — G43.119 INTRACTABLE MIGRAINE WITH AURA WITHOUT STATUS MIGRAINOSUS: ICD-10-CM

## 2018-07-11 DIAGNOSIS — M99.01 CERVICAL SEGMENT DYSFUNCTION: ICD-10-CM

## 2018-07-11 PROCEDURE — 97810 ACUP 1/> WO ESTIM 1ST 15 MIN: CPT | Mod: GA | Performed by: CHIROPRACTOR

## 2018-07-11 PROCEDURE — 98940 CHIROPRACT MANJ 1-2 REGIONS: CPT | Mod: AT | Performed by: CHIROPRACTOR

## 2018-07-11 NOTE — PROGRESS NOTES
Visit #:  11/2018      Subjective:  Olivia Flores is a 40 year old female who is seen in f/u up for:        Cervicalgia  Cervical segment dysfunction  Thoracic segment dysfunction  Intractable tension-type headache, unspecified chronicity pattern  Segmental and somatic dysfunction of head region  Intractable migraine with aura without status migrainosus.     Since last visit,   Olivia Flores reports:    Area of chief complaint:  Cervical and Thoracic :  Symptoms are graded at 3/10. Today. The pt reports no HA or migraine since the previous treatment. She was prescribed Topomax for migraine prevention and she has not experienced HA sx since the previous treatment. The pt notes tension in the shoulders more on the R side. She denies weakness in the extremities or other unusual sx.        Since last visit the patient feels that they have improved since taking medication    Objective:  The following was observed:    P: pain elicited on palpation: OCC R, C2, C3 right, T5, T6,   A: static palpation demonstrates intersegmental asymmetry OCC, R, C2, C3 right, T5, T6,    R: motion palpation notes restricted motion  T: hypertonicity at: Sub-occipital and T-spine paraspinal Bilaterally    Segmental spinal dysfunction/restrictions found at:  OCC R, C2, C3 right, T5, T6      Assessment:    Diagnoses:      1. Cervicalgia    2. Cervical segment dysfunction    3. Thoracic segment dysfunction    4. Intractable tension-type headache, unspecified chronicity pattern    5. Segmental and somatic dysfunction of head region    6. Intractable migraine with aura without status migrainosus        Patient's condition: pt stable no increase in sx due to taking medication      Treatment effectiveness:  No increase in sx pt stable   Pt responding well to treatment      Procedures:  CMT:   12542 Chiropractic manipulative treatment 1-2  regions performed   Cervical:  Activator C2, C3 L   Thoracic: T1/T2, T7, T8 prone diversified and  activator  Occiput: L OCC Activator     Modalities:  22954: Acupuncture:  Points: Lulu Points in cervical spine jaycob points of the cervical and thoracic musculature 20 minutes prone    Therapeutic procedures:  1st rib stretching R and Left 3 minutes     Response to Treatment  Reduction of symptoms no increase in sx, pt stable    Prognosis: Guarded    Progress towards Goals: Patient is making progress towards the goal.The pt would like to run or bike without pain in the neck and upper back-the pt has reached this goal  Duration to achieve goal will be 4-8 weeks at a frequency of 1 per week       Recommendations:    Instructions:drink plenty of fluids    Follow-up:  Return to care 3-4 weeks

## 2018-07-24 DIAGNOSIS — J02.0 STREPTOCOCCAL SORE THROAT: Primary | ICD-10-CM

## 2018-07-24 LAB
DEPRECATED S PYO AG THROAT QL EIA: ABNORMAL
SPECIMEN SOURCE: ABNORMAL

## 2018-07-24 PROCEDURE — 87880 STREP A ASSAY W/OPTIC: CPT | Performed by: PHYSICIAN ASSISTANT

## 2018-07-24 RX ORDER — AMOXICILLIN 875 MG
875 TABLET ORAL 2 TIMES DAILY
Qty: 20 TABLET | Refills: 0 | Status: SHIPPED | OUTPATIENT
Start: 2018-07-24 | End: 2018-08-03

## 2018-07-25 ENCOUNTER — THERAPY VISIT (OUTPATIENT)
Dept: CHIROPRACTIC MEDICINE | Facility: CLINIC | Age: 43
End: 2018-07-25
Payer: COMMERCIAL

## 2018-07-25 DIAGNOSIS — M99.00 SEGMENTAL AND SOMATIC DYSFUNCTION OF HEAD REGION: ICD-10-CM

## 2018-07-25 DIAGNOSIS — G44.201 INTRACTABLE TENSION-TYPE HEADACHE, UNSPECIFIED CHRONICITY PATTERN: ICD-10-CM

## 2018-07-25 DIAGNOSIS — M99.02 THORACIC SEGMENT DYSFUNCTION: ICD-10-CM

## 2018-07-25 DIAGNOSIS — M99.01 CERVICAL SEGMENT DYSFUNCTION: ICD-10-CM

## 2018-07-25 DIAGNOSIS — M54.2 CERVICALGIA: Primary | ICD-10-CM

## 2018-07-25 DIAGNOSIS — G43.119 INTRACTABLE MIGRAINE WITH AURA WITHOUT STATUS MIGRAINOSUS: ICD-10-CM

## 2018-07-25 PROCEDURE — 98940 CHIROPRACT MANJ 1-2 REGIONS: CPT | Mod: AT | Performed by: CHIROPRACTOR

## 2018-07-25 PROCEDURE — 97810 ACUP 1/> WO ESTIM 1ST 15 MIN: CPT | Mod: GA | Performed by: CHIROPRACTOR

## 2018-07-25 NOTE — PROGRESS NOTES
Visit #:  12/2018      Subjective:  Olivia Flores is a 40 year old female who is seen in f/u up for:        Cervicalgia  Cervical segment dysfunction  Thoracic segment dysfunction  Intractable tension-type headache, unspecified chronicity pattern  Segmental and somatic dysfunction of head region  Intractable migraine with aura without status migrainosus.     Since last visit,   Olivia Flores reports:    Area of chief complaint:  Cervical and Thoracic :  Symptoms are graded at 0-1/10. Today. The pt reports no HA or migraine since the previous treatment. She was prescribed Topomax for migraine prevention and she has not experienced HA sx since the previous treatment. The pt notes tension in the shoulders on both sides. She has been sick for several days however is now stable. She denies weakness in the extremities or other unusual sx.        Since last visit the patient feels that they have improved since taking medication    Objective:  The following was observed:    P: pain elicited on palpation: OCC R, C2, C3 right, T5, T6,   A: static palpation demonstrates intersegmental asymmetry OCC, R, C2, C3 right, T5, T6,    R: motion palpation notes restricted motion  T: hypertonicity at: Sub-occipital and T-spine paraspinal Bilaterally    Segmental spinal dysfunction/restrictions found at:  OCC R, C2, C3 right, T5, T6      Assessment:    Diagnoses:      1. Cervicalgia    2. Cervical segment dysfunction    3. Thoracic segment dysfunction    4. Intractable tension-type headache, unspecified chronicity pattern    5. Segmental and somatic dysfunction of head region    6. Intractable migraine with aura without status migrainosus        Patient's condition: pt stable no increase in sx due to taking medication      Treatment effectiveness:  No increase in sx pt stable   Pt responding well to treatment      Procedures:  CMT:   21290 Chiropractic manipulative treatment 1-2  regions performed   Cervical:  Activator C2, C3 L    Thoracic: T1/T2, T7, T8 prone diversified and activator  Occiput: L OCC Activator     Modalities:  45766: Acupuncture:  Points: Lulu Points in cervical spine jaycob points of the cervical and thoracic musculature 20 minutes prone    Therapeutic procedures:  1st rib stretching R and Left 3 minutes     Response to Treatment  Reduction of symptoms no increase in sx, pt stable    Prognosis: Guarded    Progress towards Goals: Patient is making progress towards the goal.The pt would like to run or bike without pain in the neck and upper back-the pt has reached this goal  Duration to achieve goal will be 4-8 weeks at a frequency of 1 per week       Recommendations:    Instructions:drink plenty of fluids    Follow-up:  Return to care 3-4 weeks

## 2018-08-03 DIAGNOSIS — Z01.89 LABORATORY EXAMINATION: Primary | ICD-10-CM

## 2018-08-03 DIAGNOSIS — B37.31 YEAST INFECTION OF THE VAGINA: ICD-10-CM

## 2018-08-03 LAB
SPECIMEN SOURCE: ABNORMAL
WET PREP SPEC: ABNORMAL

## 2018-08-03 PROCEDURE — 87210 SMEAR WET MOUNT SALINE/INK: CPT | Performed by: FAMILY MEDICINE

## 2018-08-03 RX ORDER — FLUCONAZOLE 150 MG/1
150 TABLET ORAL ONCE
Qty: 1 TABLET | Refills: 1 | Status: SHIPPED | OUTPATIENT
Start: 2018-08-03 | End: 2018-08-03

## 2018-08-15 ENCOUNTER — THERAPY VISIT (OUTPATIENT)
Dept: CHIROPRACTIC MEDICINE | Facility: CLINIC | Age: 43
End: 2018-08-15
Payer: COMMERCIAL

## 2018-08-15 DIAGNOSIS — M54.2 CERVICALGIA: Primary | ICD-10-CM

## 2018-08-15 DIAGNOSIS — G44.201 INTRACTABLE TENSION-TYPE HEADACHE, UNSPECIFIED CHRONICITY PATTERN: ICD-10-CM

## 2018-08-15 DIAGNOSIS — M99.01 CERVICAL SEGMENT DYSFUNCTION: ICD-10-CM

## 2018-08-15 DIAGNOSIS — M99.00 SEGMENTAL AND SOMATIC DYSFUNCTION OF HEAD REGION: ICD-10-CM

## 2018-08-15 DIAGNOSIS — M99.02 THORACIC SEGMENT DYSFUNCTION: ICD-10-CM

## 2018-08-15 DIAGNOSIS — G43.119 INTRACTABLE MIGRAINE WITH AURA WITHOUT STATUS MIGRAINOSUS: ICD-10-CM

## 2018-08-15 PROCEDURE — 97810 ACUP 1/> WO ESTIM 1ST 15 MIN: CPT | Mod: GA | Performed by: CHIROPRACTOR

## 2018-08-15 PROCEDURE — 98940 CHIROPRACT MANJ 1-2 REGIONS: CPT | Mod: AT | Performed by: CHIROPRACTOR

## 2018-08-15 NOTE — PROGRESS NOTES
Visit #:  13/2018      Subjective:  Olivia Flores is a 40 year old female who is seen in f/u up for:        Cervicalgia  Cervical segment dysfunction  Thoracic segment dysfunction  Intractable tension-type headache, unspecified chronicity pattern  Segmental and somatic dysfunction of head region  Intractable migraine with aura without status migrainosus.     Since last visit,   Olivia Flores reports:    Area of chief complaint:  Cervical and Thoracic :  Symptoms are graded at 0-1/10. Today. The pt reports no HA or migraine since the previous treatment. She was prescribed Topomax for migraine prevention and she has not experienced HA sx since the previous treatment. The pt notes tension in the shoulders on both sides. She is pleased with her progress and has not experienced migraine sx. The pt denies weakness or other unusual sx.     Since last visit the patient feels that they have improved since taking medication    Objective:  The following was observed:    P: pain elicited on palpation: OCC R, C2, C3 right, T5, T6,   A: static palpation demonstrates intersegmental asymmetry OCC, R, C2, C3 right, T5, T6,    R: motion palpation notes restricted motion  T: hypertonicity at: Sub-occipital and T-spine paraspinal Bilaterally    Segmental spinal dysfunction/restrictions found at:  OCC R, C2, C3 right, T5, T6      Assessment:    Diagnoses:      1. Cervicalgia    2. Cervical segment dysfunction    3. Thoracic segment dysfunction    4. Intractable tension-type headache, unspecified chronicity pattern    5. Segmental and somatic dysfunction of head region    6. Intractable migraine with aura without status migrainosus        Patient's condition: pt stable no increase in sx due to taking medication      Treatment effectiveness:  No increase in sx pt stable   Pt responding well to treatment      Procedures:  CMT:   91515 Chiropractic manipulative treatment 1-2  regions performed   Cervical:  Activator C2, C3 L    Thoracic: T1/T2, T7, T8 prone diversified and activator  Occiput: L OCC Activator     Modalities:  08948: Acupuncture:  Points: Lulu Points in cervical spine jaycob points of the cervical and thoracic musculature 20 minutes prone    Therapeutic procedures:  1st rib stretching R and Left 3 minutes     Response to Treatment  Reduction of symptoms no increase in sx, pt stable    Prognosis: Guarded    Progress towards Goals: Patient is making progress towards the goal.The pt would like to run or bike without pain in the neck and upper back-the pt has reached this goal  Duration to achieve goal will be 4-8 weeks at a frequency of 1 per week       Recommendations:    Instructions:drink plenty of fluids    Follow-up:  Return to care 3-4 weeks   Reduction of care

## 2018-08-16 ENCOUNTER — OFFICE VISIT (OUTPATIENT)
Dept: INTERNAL MEDICINE | Facility: CLINIC | Age: 43
End: 2018-08-16
Payer: COMMERCIAL

## 2018-08-16 VITALS
TEMPERATURE: 98.5 F | BODY MASS INDEX: 21.75 KG/M2 | DIASTOLIC BLOOD PRESSURE: 68 MMHG | SYSTOLIC BLOOD PRESSURE: 102 MMHG | HEART RATE: 60 BPM | WEIGHT: 118.2 LBS | OXYGEN SATURATION: 99 % | HEIGHT: 62 IN | RESPIRATION RATE: 18 BRPM

## 2018-08-16 DIAGNOSIS — F41.1 GAD (GENERALIZED ANXIETY DISORDER): ICD-10-CM

## 2018-08-16 DIAGNOSIS — Z13.29 SCREENING FOR THYROID DISORDER: ICD-10-CM

## 2018-08-16 DIAGNOSIS — Z13.21 ENCOUNTER FOR VITAMIN DEFICIENCY SCREENING: ICD-10-CM

## 2018-08-16 DIAGNOSIS — Z00.00 ROUTINE HISTORY AND PHYSICAL EXAMINATION OF ADULT: Primary | ICD-10-CM

## 2018-08-16 DIAGNOSIS — Z13.1 SCREENING FOR DIABETES MELLITUS: ICD-10-CM

## 2018-08-16 DIAGNOSIS — Z12.31 ENCOUNTER FOR SCREENING MAMMOGRAM FOR BREAST CANCER: ICD-10-CM

## 2018-08-16 DIAGNOSIS — Z13.220 SCREENING FOR CHOLESTEROL LEVEL: ICD-10-CM

## 2018-08-16 PROBLEM — M99.01 CERVICAL SEGMENT DYSFUNCTION: Status: RESOLVED | Noted: 2017-03-29 | Resolved: 2018-08-16

## 2018-08-16 PROBLEM — M54.2 CERVICALGIA: Status: RESOLVED | Noted: 2017-03-29 | Resolved: 2018-08-16

## 2018-08-16 PROBLEM — M99.02 THORACIC SEGMENT DYSFUNCTION: Status: RESOLVED | Noted: 2017-03-29 | Resolved: 2018-08-16

## 2018-08-16 PROBLEM — E55.9 VITAMIN D DEFICIENCY: Status: RESOLVED | Noted: 2017-05-23 | Resolved: 2018-08-16

## 2018-08-16 PROBLEM — R51.9 CEPHALGIA: Status: RESOLVED | Noted: 2017-03-29 | Resolved: 2018-08-16

## 2018-08-16 PROBLEM — E61.1 IRON DEFICIENCY: Status: RESOLVED | Noted: 2017-05-23 | Resolved: 2018-08-16
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
## 2018-08-16 PROBLEM — E53.8 VITAMIN B12 DEFICIENCY WITHOUT ANEMIA: Status: RESOLVED | Noted: 2017-05-23 | Resolved: 2018-08-16

## 2018-08-16 PROBLEM — M99.00 SEGMENTAL AND SOMATIC DYSFUNCTION OF HEAD REGION: Status: RESOLVED | Noted: 2017-03-29 | Resolved: 2018-08-16

## 2018-08-16 PROCEDURE — 99396 PREV VISIT EST AGE 40-64: CPT | Performed by: INTERNAL MEDICINE

## 2018-08-16 RX ORDER — TOPIRAMATE 25 MG/1
TABLET, FILM COATED ORAL 2 TIMES DAILY
COMMUNITY
Start: 2018-07-26

## 2018-08-16 RX ORDER — CITALOPRAM HYDROBROMIDE 40 MG/1
40 TABLET ORAL DAILY
Qty: 90 TABLET | Refills: 3 | Status: SHIPPED | OUTPATIENT
Start: 2018-08-16

## 2018-08-16 RX ORDER — LORAZEPAM 1 MG/1
1 TABLET ORAL EVERY 8 HOURS PRN
Qty: 20 TABLET | Refills: 0 | Status: SHIPPED | OUTPATIENT
Start: 2018-08-16

## 2018-08-16 NOTE — PATIENT INSTRUCTIONS
Mammogram and fasting ordered - obtain at your convenience.    Refill of Celexa sent to pharmacy.

## 2018-08-16 NOTE — MR AVS SNAPSHOT
After Visit Summary   8/16/2018    Olivia Flores    MRN: [id number]           Patient Information     Date Of Birth          1975        Visit Information        Provider Department      8/16/2018 1:30 PM Krystina Frias MD [facility]        Today's Diagnoses     Screening for thyroid disorder    -  1    Anxiety        Encounter for vitamin deficiency screening        Screening for cholesterol level        Screening for diabetes mellitus        Encounter for screening mammogram for breast cancer          Care Instructions    Mammogram and fasting ordered - obtain at your convenience.    Refill of Celexa sent to pharmacy.               Follow-ups after your visit        Your next 10 appointments already scheduled     Sep 05, 2018  8:30 AM CDT   [facility]up Follow Up with Ned Dominguez DC   [facility] [place] [facility] ([facility] [place])    [address] [place] [address]  [address]  [place] [address]   [phone]            Sep 26, 2018  8:30 AM CDT   Kevin Chiro Acup Follow Up with Ned Dominguez DC   [facility] [place] [facility] ([facility] [place])    [address] [place] [address]  [address]  [place] [address]   [phone]              Future tests that were ordered for you today     Open Future Orders        Priority Expected Expires Ordered    MA Screening Digital Bilateral Routine  8/17/2019 8/16/2018            Who to contact     If you have questions or need follow up information about today's clinic visit or your schedule please contact [facility] directly at [phone].  Normal or non-critical lab and imaging results will be communicated to you by MyChart, letter or phone within 4 business days after the clinic has received the results. If you do not hear from us within 7 days, please contact the clinic through MyChart or phone. If you have a  "critical or abnormal lab result, we will notify you by phone as soon as possible.  Submit refill requests through [email or web address] or call your pharmacy and they will forward the refill request to us. Please allow 3 business days for your refill to be completed.          Additional Information About Your Visit        Definienshart Information     [email or web address] gives you secure access to your electronic health record. If you see a primary care provider, you can also send messages to your care team and make appointments. If you have questions, please call your primary care clinic.  If you do not have a primary care provider, please call [phone] and they will assist you.        Care EveryWhere ID     This is your Care EveryWhere ID. This could be used by other organizations to access your [place] medical records  [id number]        Your Vitals Were     Pulse Temperature Respirations Height Last Period Pulse Oximetry    60 98.5  F (36.9  C) (Oral) 18 5' 2\" (1.575 m) 08/02/2018 99%    BMI (Body Mass Index)                   21.62 kg/m2            Blood Pressure from Last 3 Encounters:   08/16/18 102/68   03/21/18 118/72   12/28/17 110/68    Weight from Last 3 Encounters:   08/16/18 118 lb 3.2 oz (53.6 kg)   03/21/18 123 lb (55.8 kg)   05/23/17 115 lb (52.2 kg)              We Performed the Following     Comprehensive metabolic panel     Lipid Profile     TSH with free T4 reflex     Vitamin D Deficiency          Today's Medication Changes          These changes are accurate as of 8/16/18  1:57 PM.  If you have any questions, ask your nurse or doctor.               Start taking these medicines.        Dose/Directions    LORazepam 1 MG tablet   Commonly known as:  ATIVAN   Used for:  Anxiety   Started by:  Krystina Frias MD        Dose:  1 mg   Take 1 tablet (1 mg) by mouth every 8 hours as needed for anxiety   Quantity:  20 tablet   Refills:  0         These medicines have changed or have updated prescriptions.        " Dose/Directions    citalopram 40 MG tablet   Commonly known as:  celeXA   This may have changed:  additional instructions   Used for:  Anxiety   Changed by:  Krystina Frias MD        Dose:  40 mg   Take 1 tablet (40 mg) by mouth daily   Quantity:  90 tablet   Refills:  3            Where to get your medicines      These medications were sent to [facility] [address]  [address] [address]     Phone:  [phone]     citalopram 40 MG tablet         Some of these will need a paper prescription and others can be bought over the counter.  Ask your nurse if you have questions.     Bring a paper prescription for each of these medications     LORazepam 1 MG tablet                Primary Care Provider Office Phone # Fax #    Krystina Frias [phone]       [address] [facility] [address]        Equal Access to Services     [facility]: Hadii aad ku hadasho Soomaali, waaxda luqadaha, qaybta kaalmada adeegyada, waxay idiin hayaan adeaj springer . So [facility] [phone].    ATENCIÓN: Si habla español, tiene a hernández disposición servicios gratuitos de asistencia lingüística. Llame al [phone].    We comply with applicable federal civil rights laws and [place] laws. We do not discriminate on the basis of race, color, national origin, age, disability, sex, sexual orientation, or gender identity.            Thank you!     Thank you for choosing [facility]  for your care. Our goal is always to provide you with excellent care. Hearing back from our patients is one way we can continue to improve our services. Please take a few minutes to complete the written survey that you may receive in the mail after your visit with us. Thank you!             Your Updated Medication List - Protect others around you: Learn how to safely use, store and throw away your medicines at www.disposemymeds.org.          This list is  accurate as of 8/16/18  1:57 PM.  Always use your most recent med list.                   Brand Name Dispense Instructions for use Diagnosis    ADVIL PO      Take 800 mg by mouth Reported on 5/23/2017        citalopram 40 MG tablet    celeXA    90 tablet    Take 1 tablet (40 mg) by mouth daily    Anxiety       LORazepam 1 MG tablet    ATIVAN    20 tablet    Take 1 tablet (1 mg) by mouth every 8 hours as needed for anxiety    Anxiety       topiramate 25 MG tablet    TOPAMAX     2 times daily

## 2018-08-16 NOTE — PROGRESS NOTES
SUBJECTIVE:                                                      HPI: Olivia Flores is a pleasant 42 year old female who presents for a physical.    No specific complaints, concerns, or questions.    Needs refills of Celexa and Ativan.    ROS:  Constitutional: denies unintentional weight loss or gain; denies fevers, chills, or sweats     Cardiovascular: denies chest pain, palpitations, or edema  Respiratory: denies cough, wheezing, shortness of breath, or dyspnea on exertion  Gastrointestinal: denies nausea, vomiting, constipation, diarrhea, or abdominal pain  Genitourinary: denies urinary frequency, urgency, dysuria, or hematuria  Integumentary: denies rash or pruritus  Musculoskeletal: denies back pain, muscle pain, joint pain, or joint swelling  Neurologic: denies focal weakness, numbness, or tingling  Hematologic/Immunologic: denies history of anemia or blood transfusions  Endocrine: denies heat or cold intolerance; denies polyuria, polydipsia  Psychiatric: see PMH below    Past Medical History:   Diagnosis Date     BAILEY (generalized anxiety disorder)      Migraine with aura      Past Surgical History:   Procedure Laterality Date     LAPAROSCOPIC SALPINGO-OOPHORECTOMY Bilateral 3/15/2017    Procedure: LAPAROSCOPIC SALPINGO-OOPHORECTOMY;  Surgeon: Rashmi Bella MD;  Location: [facility] OR     Family History   Problem Relation Age of Onset     Hyperlipidemia Mother      Hypertension Mother      Melanoma Father       from metastatic fibrosarcoma (?progression from Melanoma)     Breast Cancer Maternal Grandmother      post menopausal     Myocardial Infarction Maternal Grandmother      later in life     Breast Cancer Paternal Grandmother      post-menopausal     Hypertension Paternal Grandmother      Type 2 Diabetes Maternal Grandfather      Myocardial Infarction Paternal Grandfather      later in life     Hyperlipidemia Sister      Hyperlipidemia Maternal Aunt      Uterine Cancer Maternal Aunt       "Cerebrovascular Disease No family hx of      Colon Cancer No family hx of      Occupational History     [employer] (formerly [employer])     Social History Main Topics     Smoking status: Never Smoker     Smokeless tobacco: Never Used     Alcohol use Yes      Comment: social     Drug use: No     Sexual activity: Yes     Partners: Male     Social History Narrative    .    2 daughters (6 and 9 as of 2018) - 1 special needs.    Runs, swims, or bikes - 4-5 days/week.      Allergies   Allergen Reactions     Imitrex [Sumatriptan]      Neck pain, dizziness, nausea     Current Outpatient Prescriptions   Medication Sig     citalopram (CELEXA) 40 MG tablet Take 1 tablet (40 mg) by mouth daily     Ibuprofen (ADVIL PO) Take 800 mg by mouth Reported on 5/23/2017     LORazepam (ATIVAN) 1 MG tablet Take 1 tablet (1 mg) by mouth every 8 hours as needed for anxiety     topiramate (TOPAMAX) 25 MG tablet 2 times daily      Immunization History   Administered Date(s) Administered     Influenza (IIV3) PF 09/27/2011, 10/15/2012, 10/17/2013     Pneumo Conj 13-V (2010&after) 04/07/2016     Rhogam 03/20/2012     TDAP Vaccine (Adacel) 06/01/2010, 03/19/2012     OBJECTIVE:                                                      /68  Pulse 60  Temp 98.5  F (36.9  C) (Oral)  Resp 18  Ht 5' 2\" (1.575 m)  Wt 118 lb 3.2 oz (53.6 kg)  LMP 08/02/2018  SpO2 99%  BMI 21.62 kg/m2  Constitutional: well-appearing  Eyes: normal conjunctivae and lids; pupils equal, round, and reactive to light  Ears, Nose, Mouth, and Throat: normal ears and nose; tympanic membranes visualized and normal; normal lips, teeth, and gums; no oropharyngeal lesions or ulcers  Neck: supple and symmetric; no lymphadenopathy; no thyromegaly or masses  Respiratory: normal respiratory effort; clear to auscultation bilaterally  Cardiovascular: regular rate and rhythm; pedal pulses palpable; no edema  Gastrointestinal: soft, non-tender, non-distended, and bowel sounds " present; no organomegaly or masses  Musculoskeletal: normal gait and station  Psych: normal judgment and insight; normal mood and affect; recent and remote memory intact; oriented to time, place, and person    PREVENTATIVE HEALTH                                                      BMI: within normal limits   Blood pressure: within normal limits   Breast CA screening: DUE   Cervical CA screening: last pap performed 2015; report reviewed and was normal; repeat due in 2020  Colon CA screening: not medically indicated at this time   Lung CA screening: n/a   Dexa: not medically indicated at this time   Screening HCV: n/a   Screening HIV: completed  Screening cholesterol: DUE    Screening diabetes: DUE  STD testing: no risk factors present  Depression screening: PHQ-2 assessment completed and reviewed - no intervention indicated at this time  Alcohol misuse screening: alcohol use reviewed - no intervention indicated at this time  Immunizations: reviewed; up to date     ASSESSMENT/PLAN:                                                       (Z00.00) Routine history and physical examination of adult  (primary encounter diagnosis)  Comment: PMH, PSH, FH, SH, medications, allergies, immunizations, and preventative health measures reviewed.   Plan: see below for plans.    (Z13.29) Screening for thyroid disorder  (Z13.21) Encounter for vitamin deficiency screening  (Z13.220) Screening for cholesterol level  (Z13.1) Screening for diabetes mellitus  Plan: patient will return for fasting labs when able.    (Z12.31) Encounter for screening mammogram for breast cancer  Plan: mammogram ordered - patient to schedule.    (F41.1) BAILEY (generalized anxiety disorder)  Plan: refills of Celexa and Ativan provided.    The instructions on the AVS were discussed and explained to the patient. Patient expressed understanding of instructions.    (Chart documentation was completed, in part, with DeskActive voice-recognition software. Even though  reviewed, some grammatical, spelling, and word errors may remain.)    Krystina Frias MD   [address] [address]  T: [phone], F: [phone]

## 2018-08-17 DIAGNOSIS — Z13.29 SCREENING FOR THYROID DISORDER: ICD-10-CM

## 2018-08-17 DIAGNOSIS — Z13.21 ENCOUNTER FOR VITAMIN DEFICIENCY SCREENING: ICD-10-CM

## 2018-08-17 DIAGNOSIS — Z13.220 SCREENING FOR CHOLESTEROL LEVEL: ICD-10-CM

## 2018-08-17 DIAGNOSIS — Z13.1 SCREENING FOR DIABETES MELLITUS: ICD-10-CM

## 2018-08-17 LAB
ALBUMIN SERPL-MCNC: 4.1 G/DL (ref 3.4–5)
ALP SERPL-CCNC: 53 U/L (ref 40–150)
ALT SERPL W P-5'-P-CCNC: 19 U/L (ref 0–50)
ANION GAP SERPL CALCULATED.3IONS-SCNC: 9 MMOL/L (ref 3–14)
AST SERPL W P-5'-P-CCNC: 23 U/L (ref 0–45)
BILIRUB SERPL-MCNC: 0.4 MG/DL (ref 0.2–1.3)
BUN SERPL-MCNC: 20 MG/DL (ref 7–30)
CALCIUM SERPL-MCNC: 9 MG/DL (ref 8.5–10.1)
CHLORIDE SERPL-SCNC: 108 MMOL/L (ref 94–109)
CHOLEST SERPL-MCNC: 155 MG/DL
CO2 SERPL-SCNC: 22 MMOL/L (ref 20–32)
CREAT SERPL-MCNC: 0.89 MG/DL (ref 0.52–1.04)
GFR SERPL CREATININE-BSD FRML MDRD: 70 ML/MIN/1.7M2
GLUCOSE SERPL-MCNC: 92 MG/DL (ref 70–99)
HDLC SERPL-MCNC: 68 MG/DL
LDLC SERPL CALC-MCNC: 76 MG/DL
NONHDLC SERPL-MCNC: 87 MG/DL
POTASSIUM SERPL-SCNC: 3.5 MMOL/L (ref 3.4–5.3)
PROT SERPL-MCNC: 7.7 G/DL (ref 6.8–8.8)
SODIUM SERPL-SCNC: 139 MMOL/L (ref 133–144)
TRIGL SERPL-MCNC: 54 MG/DL
TSH SERPL DL<=0.005 MIU/L-ACNC: 1.1 MU/L (ref 0.4–4)

## 2018-08-17 PROCEDURE — 82306 VITAMIN D 25 HYDROXY: CPT | Performed by: INTERNAL MEDICINE

## 2018-08-17 PROCEDURE — 80053 COMPREHEN METABOLIC PANEL: CPT | Performed by: INTERNAL MEDICINE

## 2018-08-17 PROCEDURE — 80061 LIPID PANEL: CPT | Performed by: INTERNAL MEDICINE

## 2018-08-17 PROCEDURE — 84443 ASSAY THYROID STIM HORMONE: CPT | Performed by: INTERNAL MEDICINE

## 2018-08-17 PROCEDURE — 36415 COLL VENOUS BLD VENIPUNCTURE: CPT | Performed by: INTERNAL MEDICINE

## 2018-08-20 LAB — DEPRECATED CALCIDIOL+CALCIFEROL SERPL-MC: 39 UG/L (ref 20–75)

## 2018-09-05 ENCOUNTER — THERAPY VISIT (OUTPATIENT)
Dept: CHIROPRACTIC MEDICINE | Facility: CLINIC | Age: 43
End: 2018-09-05
Payer: COMMERCIAL

## 2018-09-05 DIAGNOSIS — M99.02 THORACIC SEGMENT DYSFUNCTION: ICD-10-CM

## 2018-09-05 DIAGNOSIS — G43.109 MIGRAINE WITH AURA AND WITHOUT STATUS MIGRAINOSUS, NOT INTRACTABLE: ICD-10-CM

## 2018-09-05 DIAGNOSIS — M99.01 CERVICAL SEGMENT DYSFUNCTION: Primary | ICD-10-CM

## 2018-09-05 DIAGNOSIS — M54.2 CERVICALGIA: ICD-10-CM

## 2018-09-05 PROCEDURE — 98940 CHIROPRACT MANJ 1-2 REGIONS: CPT | Mod: AT | Performed by: CHIROPRACTOR

## 2018-09-05 PROCEDURE — 97810 ACUP 1/> WO ESTIM 1ST 15 MIN: CPT | Mod: GA | Performed by: CHIROPRACTOR

## 2018-09-05 NOTE — PROGRESS NOTES
Visit #:  14/2018      Subjective:  Olivia Flores is a 40 year old female who is seen in f/u up for:        Cervical segment dysfunction  Cervicalgia  Thoracic segment dysfunction  Migraine with aura and without status migrainosus, not intractable.     Since last visit,   Olivia Flores reports:    Area of chief complaint:  Cervical and Thoracic :  Symptoms are graded at 5/10. Today. The pt reports one migraine last week that she relates to her cycle. The HA last one day. The pt continues to take Topomax. She notes stiffness in the neck area. She also walked for prolonged periods [place] the fair this [place]. She denies weakness or other unusual sx.       Since last visit the patient feels that they have improved since taking medication    Objective:  The following was observed:    P: pain elicited on palpation: OCC R, C2, C3 right, T5, T6,   A: static palpation demonstrates intersegmental asymmetry OCC, R, C2, C3 right, T5, T6,    R: motion palpation notes restricted motion  T: hypertonicity at: Sub-occipital and T-spine paraspinal Bilaterally    Segmental spinal dysfunction/restrictions found at:  OCC R, C2, C3 right, T5, T6      Assessment:    Diagnoses:      1. Cervical segment dysfunction    2. Cervicalgia    3. Thoracic segment dysfunction    4. Migraine with aura and without status migrainosus, not intractable        Patient's condition: pt stable no increase in sx due to taking medication      Treatment effectiveness:  No increase in sx pt stable   Pt responding well to treatment      Procedures:  CMT:   71884 Chiropractic manipulative treatment 1-2  regions performed   Cervical:  Activator C2, C3 L   Thoracic: T1/T2, T7, T8 prone diversified and activator  Occiput: L OCC Activator     Modalities:  24944: Acupuncture:  Points: Waylonatmarcello Points in cervical spine jaycob points of the cervical and thoracic musculature 20 minutes prone    Therapeutic procedures:  1st rib stretching R and Left 3 minutes      Response to Treatment  Reduction of symptoms no increase in sx, pt stable    Prognosis: Guarded    Progress towards Goals: Patient is making progress towards the goal.The pt would like to run or bike without pain in the neck and upper back-the pt has reached this goal  Duration to achieve goal will be 4-8 weeks at a frequency of 1 per week       Recommendations:    Instructions:drink plenty of fluids    Follow-up:  Return to care 3-4 weeks   Reduction of care

## 2018-09-07 ENCOUNTER — RADIANT APPOINTMENT (OUTPATIENT)
Dept: MAMMOGRAPHY | Facility: CLINIC | Age: 43
End: 2018-09-07
Attending: INTERNAL MEDICINE
Payer: COMMERCIAL

## 2018-09-07 DIAGNOSIS — Z12.31 ENCOUNTER FOR SCREENING MAMMOGRAM FOR BREAST CANCER: ICD-10-CM

## 2018-09-07 DIAGNOSIS — Z12.31 VISIT FOR SCREENING MAMMOGRAM: ICD-10-CM

## 2018-09-07 PROCEDURE — 77063 BREAST TOMOSYNTHESIS BI: CPT | Mod: TC

## 2018-09-07 PROCEDURE — 77067 SCR MAMMO BI INCL CAD: CPT | Mod: TC

## 2018-09-26 ENCOUNTER — THERAPY VISIT (OUTPATIENT)
Dept: CHIROPRACTIC MEDICINE | Facility: CLINIC | Age: 43
End: 2018-09-26
Payer: COMMERCIAL

## 2018-09-26 DIAGNOSIS — M99.02 THORACIC SEGMENT DYSFUNCTION: ICD-10-CM

## 2018-09-26 DIAGNOSIS — M54.2 CERVICALGIA: Primary | ICD-10-CM

## 2018-09-26 DIAGNOSIS — M99.01 CERVICAL SEGMENT DYSFUNCTION: ICD-10-CM

## 2018-09-26 DIAGNOSIS — G43.109 MIGRAINE WITH AURA AND WITHOUT STATUS MIGRAINOSUS, NOT INTRACTABLE: ICD-10-CM

## 2018-09-26 PROCEDURE — 98940 CHIROPRACT MANJ 1-2 REGIONS: CPT | Mod: AT | Performed by: CHIROPRACTOR

## 2018-09-26 PROCEDURE — 97810 ACUP 1/> WO ESTIM 1ST 15 MIN: CPT | Mod: GA | Performed by: CHIROPRACTOR

## 2018-09-26 NOTE — PROGRESS NOTES
Visit #:  15/2018      Subjective:  Olivia Flores is a 40 year old female who is seen in f/u up for:        Cervical segment dysfunction  Cervicalgia  Thoracic segment dysfunction  Migraine with aura and without status migrainosus, not intractable.     Since last visit,   Olivia Flores reports:    Area of chief complaint:  Cervical and Thoracic :  Symptoms are graded at 5/10. Today. The pt reports one migraine in the past 3 weeks  that she relates hormones and her cycle. The px did not last too long. She also reports stress that may be contributing. She denies other unusual sx.       Since last visit the patient feels that they are worse    Objective:  The following was observed:    P: pain elicited on palpation: OCC R, C2, C3 right, T5, T6,   A: static palpation demonstrates intersegmental asymmetry OCC, R, C2, C3 right, T5, T6,    R: motion palpation notes restricted motion  T: hypertonicity at: Sub-occipital and T-spine paraspinal Bilaterally    Segmental spinal dysfunction/restrictions found at:  OCC R, C2, C3 right, T5, T6      Assessment:    Diagnoses:      1. Cervical segment dysfunction    2. Cervicalgia    3. Thoracic segment dysfunction    4. Migraine with aura and without status migrainosus, not intractable        Patient's condition: pt stable no increase in sx due to taking medication      Treatment effectiveness:  No increase in sx pt stable   Pt responding well to treatment      Procedures:  CMT:   85195 Chiropractic manipulative treatment 1-2  regions performed   Cervical:  Activator C2, C3 L   Thoracic: T1/T2, T7, T8 prone diversified and activator  Occiput: L OCC Activator     Modalities:  93033: Acupuncture:  Points: Waylonatmarcello Points in cervical spine jaycob points of the cervical and thoracic musculature 20 minutes prone    Therapeutic procedures:  1st rib stretching R and Left 3 minutes     Response to Treatment  Reduction of symptoms no increase in sx, pt stable    Prognosis:  Guarded    Progress towards Goals: Patient is making progress towards the goal.The pt would like to run or bike without pain in the neck and upper back-the pt has reached this goal  Duration to achieve goal will be 4-8 weeks at a frequency of 1 per week       Recommendations:    Instructions:drink plenty of fluids    Follow-up:  Return to care 3-4 weeks   Reduction of care

## 2018-10-24 DIAGNOSIS — J02.0 STREPTOCOCCAL PHARYNGITIS: Primary | ICD-10-CM

## 2018-10-24 LAB
DEPRECATED S PYO AG THROAT QL EIA: NORMAL
SPECIMEN SOURCE: NORMAL

## 2018-10-24 PROCEDURE — 87880 STREP A ASSAY W/OPTIC: CPT | Performed by: FAMILY MEDICINE

## 2018-10-24 PROCEDURE — 87081 CULTURE SCREEN ONLY: CPT | Performed by: FAMILY MEDICINE

## 2018-10-25 LAB
BACTERIA SPEC CULT: NORMAL
SPECIMEN SOURCE: NORMAL

## 2019-02-15 ENCOUNTER — HEALTH MAINTENANCE LETTER (OUTPATIENT)
Age: 44
End: 2019-02-15

## 2019-06-17 PROBLEM — J30.89 PERENNIAL ALLERGIC RHINITIS: Status: RESOLVED | Noted: 2017-05-23 | Resolved: 2018-08-16

## 2019-11-06 ENCOUNTER — HEALTH MAINTENANCE LETTER (OUTPATIENT)
Age: 44
End: 2019-11-06

## 2019-11-27 ENCOUNTER — TRANSFERRED RECORDS (OUTPATIENT)
Dept: HEALTH INFORMATION MANAGEMENT | Facility: CLINIC | Age: 44
End: 2019-11-27

## 2020-11-29 ENCOUNTER — HEALTH MAINTENANCE LETTER (OUTPATIENT)
Age: 45
End: 2020-11-29

## 2021-01-15 ENCOUNTER — HEALTH MAINTENANCE LETTER (OUTPATIENT)
Age: 46
End: 2021-01-15

## 2021-09-19 ENCOUNTER — HEALTH MAINTENANCE LETTER (OUTPATIENT)
Age: 46
End: 2021-09-19

## 2022-01-09 ENCOUNTER — HEALTH MAINTENANCE LETTER (OUTPATIENT)
Age: 47
End: 2022-01-09

## 2022-11-21 ENCOUNTER — HEALTH MAINTENANCE LETTER (OUTPATIENT)
Age: 47
End: 2022-11-21

## 2023-04-16 ENCOUNTER — HEALTH MAINTENANCE LETTER (OUTPATIENT)
Age: 48
End: 2023-04-16

## 2023-11-26 ENCOUNTER — HEALTH MAINTENANCE LETTER (OUTPATIENT)
Age: 48
End: 2023-11-26

## (undated) DEVICE — ENDO TROCAR 11MM VERSASTEP VS101011P

## (undated) DEVICE — SU MONOCRYL 4-0 PS-2 27" UND Y426H

## (undated) DEVICE — CATH TRAY FOLEY 16FR DRAINAGE BAG STATLOCK 899916

## (undated) DEVICE — GLOVE PROTEXIS MICRO 7.0  2D73PM70

## (undated) DEVICE — GLOVE PROTEXIS BLUE W/NEU-THERA 6.0  2D73EB60

## (undated) DEVICE — ENDO TROCAR 05MM VERSASTEP VS101005

## (undated) DEVICE — ENDO POUCH GOLD 10MM ECATCH 173050G

## (undated) DEVICE — ESU LIGASURE LAPAROSCOPIC BLUNT TIP SEALER 5MMX37CM LF1637

## (undated) DEVICE — ESU GROUND PAD ADULT W/CORD E7507

## (undated) DEVICE — GLOVE PROTEXIS W/NEU-THERA 5.5  2D73TE55

## (undated) DEVICE — ESU PENCIL W/COATED BLADE E2450H

## (undated) DEVICE — SU DERMABOND MINI DHVM12

## (undated) DEVICE — LINEN FULL SHEET 5511

## (undated) DEVICE — BAG CLEAR TRASH 1.3M 39X33" P4040C

## (undated) DEVICE — SYR 03ML LL W/O NDL 309657

## (undated) DEVICE — DRAPE MAYO STAND 23X54 8337

## (undated) DEVICE — LINEN TOWEL PACK X5 5464

## (undated) DEVICE — NDL INSUFFLATION 14GA STEP S100000

## (undated) DEVICE — PACK GYN LAPAROSCOPY RIDGES

## (undated) DEVICE — PAD CHUX UNDERPAD 30X36" P3036C

## (undated) DEVICE — LINEN HALF SHEET 5512

## (undated) RX ORDER — KETOROLAC TROMETHAMINE 30 MG/ML
INJECTION, SOLUTION INTRAMUSCULAR; INTRAVENOUS
Status: DISPENSED
Start: 2017-03-15

## (undated) RX ORDER — ACETAMINOPHEN 10 MG/ML
INJECTION, SOLUTION INTRAVENOUS
Status: DISPENSED
Start: 2017-03-15

## (undated) RX ORDER — DEXAMETHASONE SODIUM PHOSPHATE 4 MG/ML
INJECTION, SOLUTION INTRA-ARTICULAR; INTRALESIONAL; INTRAMUSCULAR; INTRAVENOUS; SOFT TISSUE
Status: DISPENSED
Start: 2017-03-15

## (undated) RX ORDER — NEOSTIGMINE METHYLSULFATE 5 MG/5 ML
SYRINGE (ML) INTRAVENOUS
Status: DISPENSED
Start: 2017-03-15

## (undated) RX ORDER — DIPHENHYDRAMINE HYDROCHLORIDE 50 MG/ML
INJECTION INTRAMUSCULAR; INTRAVENOUS
Status: DISPENSED
Start: 2017-03-15

## (undated) RX ORDER — BUPIVACAINE HYDROCHLORIDE 2.5 MG/ML
INJECTION, SOLUTION EPIDURAL; INFILTRATION; INTRACAUDAL
Status: DISPENSED
Start: 2017-03-15

## (undated) RX ORDER — GLYCOPYRROLATE 0.2 MG/ML
INJECTION INTRAMUSCULAR; INTRAVENOUS
Status: DISPENSED
Start: 2017-03-15

## (undated) RX ORDER — PROPOFOL 10 MG/ML
INJECTION, EMULSION INTRAVENOUS
Status: DISPENSED
Start: 2017-03-15

## (undated) RX ORDER — OXYCODONE HYDROCHLORIDE 5 MG/1
TABLET ORAL
Status: DISPENSED
Start: 2017-03-15

## (undated) RX ORDER — HYDROMORPHONE HYDROCHLORIDE 1 MG/ML
INJECTION, SOLUTION INTRAMUSCULAR; INTRAVENOUS; SUBCUTANEOUS
Status: DISPENSED
Start: 2017-03-15

## (undated) RX ORDER — LIDOCAINE HYDROCHLORIDE 10 MG/ML
INJECTION, SOLUTION EPIDURAL; INFILTRATION; INTRACAUDAL; PERINEURAL
Status: DISPENSED
Start: 2017-03-15

## (undated) RX ORDER — ONDANSETRON 2 MG/ML
INJECTION INTRAMUSCULAR; INTRAVENOUS
Status: DISPENSED
Start: 2017-03-15

## (undated) RX ORDER — FENTANYL CITRATE 50 UG/ML
INJECTION, SOLUTION INTRAMUSCULAR; INTRAVENOUS
Status: DISPENSED
Start: 2017-03-15